# Patient Record
Sex: MALE | Employment: OTHER | URBAN - METROPOLITAN AREA
[De-identification: names, ages, dates, MRNs, and addresses within clinical notes are randomized per-mention and may not be internally consistent; named-entity substitution may affect disease eponyms.]

---

## 2024-05-24 ENCOUNTER — TRANSFERRED RECORDS (OUTPATIENT)
Dept: HEALTH INFORMATION MANAGEMENT | Facility: CLINIC | Age: 64
End: 2024-05-24

## 2024-07-19 ENCOUNTER — TRANSCRIBE ORDERS (OUTPATIENT)
Dept: OTHER | Age: 64
End: 2024-07-19

## 2024-07-19 ENCOUNTER — MEDICAL CORRESPONDENCE (OUTPATIENT)
Dept: HEALTH INFORMATION MANAGEMENT | Facility: CLINIC | Age: 64
End: 2024-07-19

## 2024-07-19 DIAGNOSIS — R91.8 ABNORMAL CT LUNG SCREENING: Primary | ICD-10-CM

## 2024-07-22 ENCOUNTER — PRE VISIT (OUTPATIENT)
Dept: ONCOLOGY | Facility: CLINIC | Age: 64
End: 2024-07-22

## 2024-07-22 ENCOUNTER — PATIENT OUTREACH (OUTPATIENT)
Dept: ONCOLOGY | Facility: CLINIC | Age: 64
End: 2024-07-22

## 2024-07-22 DIAGNOSIS — R91.8 PULMONARY NODULES: Primary | ICD-10-CM

## 2024-07-22 NOTE — TELEPHONE ENCOUNTER
RECORDS STATUS - ALL OTHER DIAGNOSIS      RECORDS RECEIVED FROM:    Appt Date: TBD NN WQ    Abnormal CT lung screening   Action    Action Taken 7/22/2024 2:42pm KESHERLEY     I called [facility] Ph: [phone] #0  (Stay on the line) - the phone went to . I sent a STAT request to fax: [phone].     I called [facility] [phone] - all the scans from 2024 were done at [facility]     I called [facility] [phone]     Ph: [phone]  Fax: [phone]  Mailing address:   [address] [place] [address]    [employer] Tracking Number: [id number]      NOTES STATUS DETAILS   OFFICE NOTE from referring provider  Jadiel Pate, DO    OFFICE NOTE from medical oncologist     DISCHARGE SUMMARY from hospital     DISCHARGE REPORT from the ER     OPERATIVE REPORT Complete PFT 7/22/2024   CLINICAL TRIAL TREATMENTS TO DATE     LABS     PATHOLOGY REPORTS     ANYTHING RELATED TO DIAGNOSIS     PATHOLOGY FEDEX TRACKING   TRACKING #:   GENONOMIC TESTING     TYPE:     IMAGING (NEED IMAGES & REPORT)     CT SCANS Requested- [facility]/ [facility]  CT Chest 5/24/2024   MRI     XRAYS Requested- [facility]     ULTRASOUND     PET     IMAGE DISC FEDEX TRACKING   TRACKING #:

## 2024-07-22 NOTE — PROGRESS NOTES
New IP (Interventional Pulmonology) referral rec'd.  Chart reviewed.       New Patient: Interventional Pulmonary (Lung nodule) Nurse Navigator Note    Referring provider: Dr Jadiel Pate affiliated with [facility]    Referred to (specialty): Interventional Pulmonary (Lung nodule)    Requested provider (if applicable): n/a    Date Referral Received: 7/22/2024    Evaluation for :  Mass like RUL opacity that is concerning for malignancy    Clinical History (per Nurse review of records provided):    **BOOK MARKED**    [facility]  PROCEDURE:  LOW DOSE CT OF THE CHEST WITHOUT IV CONTRAST     HISTORY: Lung cancer screening.     TECHNIQUE: Contiguous axial images were acquired through the chest utilizing   low-dose technique. Coronal and sagittal reconstructions.     CT DLP DOSE: 54 mGy-cm     COMPARISON: None available.     FINDINGS:   Lungs: Mild paraseptal type emphysema with an upper lobe predominance. There is   complete opacification of the proximal portion of a segmental branch of the   right upper lobe, best seen on axial mage 42 and coronal image 190. Adjacent to   this bronchus, are multiple calcifications, which are likely calcified lymph   nodes. Distal to the opacified bronchus, in the anteromedial aspect of the right   upper lobe on axial images 34 through 41, there is a masslike opacity measuring   4.9 x 4.2 cm. A few scattered endobronchial opacities are seen in other   subsegmental branches of the right upper lobe (also distal to the obstructed   bronchus). There is a pulmonary nodule seen in the right lower lobe   posterolaterally on axial image 75, measuring 0.7 x 0.6 cm, which contains a   tiny punctate central calcification.     Remainder of the chest: Normal heart size. No pericardial effusion. Severe   coronary artery calcifications. Normal caliber thoracic aorta. Calcified   mediastinal and hilar lymph nodes are like related to old healed  granulomatous   disease.     Visualized upper abdomen: Granulomatous calcifications spleen.     Bones: Degenerative changes.     IMPRESSION:   1. There is complete opacification of the right upper lobe bronchus, as   described above, with a masslike opacity seen in the distribution of this   bronchus. The endobronchial opacity could be related to a benign process, such   as an infectious or inflammatory process, or neoplastic process. The masslike   opacity could be related to postobstructive consolidation, although a neoplastic   process is not excluded. Recommend pulmonary consultation for possible   bronchoscopy, versus short-term follow-up chest CT or PET/CT.   2.  Indeterminate right lower lobe pulmonary nodule which could be related to a   partially calcified granuloma. Recommend follow-up per Fleischner Society   guidelines (follow-up in 3 to 6 months).   3.  Other findings as noted.     RECOMMENDATION: Pulmonary consultation for possible bronchoscopy, versus   short-term follow-up chest CT or PET/CT.     Lung-RADS category 4 - Suspicious: Findings for which additional diagnostic   testing and/or tissue sampling is recommended.     Please note that all CT scans at this facility use dose modulation, iterative   reconstruction, and/or weight-based dosing when appropriate to reduce radiation   dose to as low as reasonably achievable.      Electronically signed by Sarbjit Cronin MD   Report Date: 5/24/2024 2:02 PM  Records Location: [facility] &  ([facility] [employer] [facility])    RECORDS NEEDED:  Last FIVE years CHEST imaging pushed to PACS from [facility] [employer] [facility]----thank you!!    Additional testing needed prior to consult: pft's

## 2024-07-24 NOTE — TELEPHONE ENCOUNTER
Imaging DISC Received  July 24, 2024 11:15 AM AW   Action: Imaging disc from [facility] received and taken to [facility] for upload.

## 2024-07-30 ENCOUNTER — PATIENT OUTREACH (OUTPATIENT)
Dept: ONCOLOGY | Facility: CLINIC | Age: 64
End: 2024-07-30

## 2024-07-30 NOTE — PROGRESS NOTES
Writer received a message from NPS that they have not been able to reach Akira for his urgent Interventional Pulmonology referral. Per chart review, Marsha ELLISON Was holding 8/6 with Dr. Hutchinson at 3:00 pm for him. However, this spot is now scheduled with another pt.     Writer called Akira to discuss the referral. He reports he was not aware schedulers were trying to reach  him. He would like to still be scheduled next week if possible. Writer will discuss with Dr. Hutchinson and keep Akira updated..     Writer gave Akira our phone number if any additional questions or concerns in the meantime.     Gladys Olmstead, JENNIFFERN, RN, OCN  [facility] Oncology Nurse Navigator  [phone] / [phone]

## 2024-08-02 DIAGNOSIS — Z87.19 S/P INGUINAL HERNIA REPAIR: ICD-10-CM

## 2024-08-02 DIAGNOSIS — I25.10 CORONARY ARTERY CALCIFICATION: ICD-10-CM

## 2024-08-02 DIAGNOSIS — R91.8 PULMONARY NODULES: ICD-10-CM

## 2024-08-02 DIAGNOSIS — I25.10 CAD (CORONARY ARTERY DISEASE): Primary | ICD-10-CM

## 2024-08-02 DIAGNOSIS — Z98.890 S/P INGUINAL HERNIA REPAIR: ICD-10-CM

## 2024-08-02 DIAGNOSIS — J44.9 COPD (CHRONIC OBSTRUCTIVE PULMONARY DISEASE) (H): ICD-10-CM

## 2024-08-02 PROBLEM — Z86.0100 PERSONAL HISTORY OF COLONIC POLYPS: Status: ACTIVE | Noted: 2020-01-30

## 2024-08-02 RX ORDER — ROSUVASTATIN CALCIUM 20 MG/1
1 TABLET, COATED ORAL AT BEDTIME
COMMUNITY
Start: 2024-05-15

## 2024-08-02 RX ORDER — NICOTINE 21 MG/24HR
1 PATCH, TRANSDERMAL 24 HOURS TRANSDERMAL
COMMUNITY
Start: 2024-05-09

## 2024-08-02 NOTE — PROGRESS NOTES
Writer called Akira back and we are now able to get him scheduled with Dr. Hutchinson 8/6. Akira verbalized understanding and is in agreement with the plan. His call was transferred to NPS to schedule.     JENNIFFER CaballeroN, RN, OCN  [facility] Oncology Nurse Navigator  [phone] / [phone]

## 2024-08-02 NOTE — PROGRESS NOTES
Pre-visit planning and chart review completed.     NEW patient appointment:    8/6 with Dr. Hutchinson  7/26 CT chest w contrast  8/6 PFTs    - No anticoagulation.  - Current smoker.    CE updated. Medications, allergies, problem list, and immunizations reconciled.

## 2024-08-05 NOTE — TELEPHONE ENCOUNTER
RECORDS STATUS - ALL OTHER DIAGNOSIS      RECORDS RECEIVED FROM:    DATE RECEIVED:    NOTES STATUS DETAILS   OFFICE NOTE from referring provider     OFFICE NOTE from medical oncologist     DISCHARGE SUMMARY from hospital     DISCHARGE REPORT from the ER     OPERATIVE REPORT     MEDICATION LIST     CLINICAL TRIAL TREATMENTS TO DATE     LABS     PATHOLOGY REPORTS     ANYTHING RELATED TO DIAGNOSIS     PATHOLOGY FEDEX TRACKING   TRACKING #:   GENONOMIC TESTING     TYPE:     IMAGING (NEED IMAGES & REPORT)     CT SCANS PACS 4/24/24: [facility]    5/24/24: [facility]   MRI     XRAYS PACS 4/24/24: [facility]   ULTRASOUND     PET     IMAGE DISC FEDEX TRACKING   TRACKING #:

## 2024-08-06 ENCOUNTER — OFFICE VISIT (OUTPATIENT)
Dept: PULMONOLOGY | Facility: CLINIC | Age: 64
End: 2024-08-06
Payer: COMMERCIAL

## 2024-08-06 ENCOUNTER — PRE VISIT (OUTPATIENT)
Dept: PULMONOLOGY | Facility: CLINIC | Age: 64
End: 2024-08-06

## 2024-08-06 ENCOUNTER — TELEPHONE (OUTPATIENT)
Dept: PULMONOLOGY | Facility: CLINIC | Age: 64
End: 2024-08-06

## 2024-08-06 ENCOUNTER — VIRTUAL VISIT (OUTPATIENT)
Dept: PULMONOLOGY | Facility: CLINIC | Age: 64
End: 2024-08-06
Attending: INTERNAL MEDICINE
Payer: MEDICARE

## 2024-08-06 VITALS — HEIGHT: 65 IN | WEIGHT: 145 LBS | BODY MASS INDEX: 24.16 KG/M2

## 2024-08-06 DIAGNOSIS — R91.8 PULMONARY NODULES: ICD-10-CM

## 2024-08-06 DIAGNOSIS — R91.8 ABNORMAL CT LUNG SCREENING: ICD-10-CM

## 2024-08-06 DIAGNOSIS — R91.8 LUNG MASS: Primary | ICD-10-CM

## 2024-08-06 PROCEDURE — 94726 PLETHYSMOGRAPHY LUNG VOLUMES: CPT | Performed by: INTERNAL MEDICINE

## 2024-08-06 PROCEDURE — 99204 OFFICE O/P NEW MOD 45 MIN: CPT | Mod: 95 | Performed by: INTERNAL MEDICINE

## 2024-08-06 PROCEDURE — 94060 EVALUATION OF WHEEZING: CPT | Performed by: INTERNAL MEDICINE

## 2024-08-06 ASSESSMENT — PAIN SCALES - GENERAL: PAINLEVEL: NO PAIN (0)

## 2024-08-06 NOTE — LETTER
8/6/2024       RE: Akira Plasencia  [address] [facility] [address]     Dear Colleague,    Thank you for referring your patient, Akira Plasencia, to the [facility] at [facility]. Please see a copy of my visit note below.          [facility]  [address] [address]  Phone: [phone]  Fax: [phone]    Interventional Pulmonary Clinic Note    August 6, 2024        Chief complaint/Reason for Clinic Visit:  Akira Plasencia is a 64 year old male seen for   Chief Complaint   Patient presents with    Consult       Referred by or PCP: Jadiel Pate    Assessment and Plan:  # RUL pneumonia in April 2024 when he was treated as an outpatient.  There is still residual right upper lobe consolidation most likely scar tissue.  Based on CT scan images, right upper lobe segments (apical, anterior) are not patent.  I discussed trach airway examination with him and patient is in agreement with the plan.  We will also repeat CT scan of the chest in 3 to 5 months to establish stability of the right upper lobe masslike lesion.  Patient is in agreement with the plan.  Planned procedure: Bronchoscopy with airway examination with possible endobronchial biopsies if there is any lesion     # COPD based on PFTs from August 2024, not on any inhalers    # Active smoker trying to quit with nicotine patch.    History of Present Illness:  64 years old man accompanied by his sister-in-law during this virtual visit.  He was referred to my clinic for further evaluation of his abnormal CT findings.  He was presented in April 2024 with chest pain and cough when he was diagnosed of pneumonia and treated as an outpatient with antibiotic regimen for 7 to 10 days.    Lung problems: no official diagnosis of copd  Respiratory symptoms: no more cough or chest pain, some shortness of breath on exetion  Family lung  problems: no. Cancer in the family- breast and colon cancer   Smoking: smokes 1.5 ppd, for the last 50 years  Vaping: no  Exposure to chemicals, radiation or asbestos: no (lived [place] farm)    PFTs: (personally reviewed) mild obstruction from 8/6/2024  CTs, CXRs, Labs, PET, Echos: (personally reviewed)      No Known Allergies     No past medical history on file.     No past surgical history on file.     Social History     Socioeconomic History    Marital status: Single     Spouse name: Not on file    Number of children: Not on file    Years of education: Not on file    Highest education level: Not on file   Occupational History    Not on file   Tobacco Use    Smoking status: Every Day     Types: Cigarettes    Smokeless tobacco: Not on file   Substance and Sexual Activity    Alcohol use: Not on file    Drug use: Not on file    Sexual activity: Not on file   Other Topics Concern    Not on file   Social History Narrative    Not on file     Social Determinants of Health     Financial Resource Strain: High Risk (1/1/2022)    Received from [facility]    Financial Resource Strain     Difficulty of Paying Living Expenses: Not on file     Difficulty of Paying Living Expenses: Not on file   Food Insecurity: Not on file   Transportation Needs: Not on file   Physical Activity: Not on file   Stress: Not on file   Social Connections: Unknown (5/9/2024)    Received from [facility]    Social Connections     Frequency of Communication with Friends and Family: Not on file   Interpersonal Safety: Not on file   Housing Stability: Not on file        No family history on file.     Immunization History   Administered Date(s) Administered    TDAP (Adacel,Boostrix) 10/18/2011, 05/26/2019       Current Outpatient Medications   Medication Sig Dispense Refill    nicotine (NICODERM CQ) 21 MG/24HR 24 hr patch Place 1 patch onto the skin      rosuvastatin (CRESTOR) 20 MG tablet  "Take 1 tablet by mouth at bedtime       No current facility-administered medications for this visit.        Review of Systems:  I have done 10 points of review systems and all negative except for those mentioned in HPI    Physical examination:  Constitutional: Oriented, not in distress  Vitals: unavailable  Eyes: No icterus, nystagmus, pupils isocoric  Head and neck: normal posture and movements  Respiratory: Normal tidal breathing, no shortness of breath, no audible wheezing or stridor over the phone or video visit  Musculoskeletal: Normal muscle mass, no deformity on hands/fingers  Integumentary:  No rash on visible skin areas on video visit  Neurological: Alert, orientedx3, no motor deficits  Psychiatric:  Mood and affect are appropriate with insight into his/her medical condition    Data:  No results found for: \"WBC\"  No results found for: \"RBC\"  No results found for: \"HGB\"  No results found for: \"HCT\"  No results found for: \"MCV\"  No results found for: \"MCH\"  No results found for: \"MCHC\"  No results found for: \"RDW\"  No results found for: \"PLT\"    No results found for: \"NA\"   No results found for: \"POTASSIUM\"  No results found for: \"CHLORIDE\"  No results found for: \"RAF\"  No results found for: \"CO2\"  No results found for: \"BUN\"  No results found for: \"CR\"  No results found for: \"GLC\"      MARCY Hutchinson MD         "

## 2024-08-06 NOTE — PROGRESS NOTES
Virtual Visit Details    Type of service:  Video Visit   Video Start Time: 3:15 pm  Video End Time:3:35 pm    Originating Location (pt. Location): Home    Distant Location (provider location):  On-site  Platform used for Video Visit: [facility]  [address] [address]  Phone: [phone]  Fax: [phone]    Interventional Pulmonary Clinic Note    August 6, 2024        Chief complaint/Reason for Clinic Visit:  Akira Plasencia is a 64 year old male seen for   Chief Complaint   Patient presents with    Consult       Referred by or PCP: Jadiel Pate    Assessment and Plan:  # RUL pneumonia in April 2024 when he was treated as an outpatient.  There is still residual right upper lobe consolidation most likely scar tissue.  Based on CT scan images, right upper lobe segments (apical, anterior) are not patent.  I discussed trach airway examination with him and patient is in agreement with the plan.  We will also repeat CT scan of the chest in 3 to 5 months to establish stability of the right upper lobe masslike lesion.  Patient is in agreement with the plan.  Planned procedure: Bronchoscopy with airway examination with possible endobronchial biopsies if there is any lesion     # COPD based on PFTs from August 2024, not on any inhalers    # Active smoker trying to quit with nicotine patch.    History of Present Illness:  64 years old man accompanied by his sister-in-law during this virtual visit.  He was referred to my clinic for further evaluation of his abnormal CT findings.  He was presented in April 2024 with chest pain and cough when he was diagnosed of pneumonia and treated as an outpatient with antibiotic regimen for 7 to 10 days.    Lung problems: no official diagnosis of copd  Respiratory symptoms: no more cough or chest pain, some shortness of breath on exetion  Family lung problems: no. Cancer in the family- breast and colon cancer    Smoking: smokes 1.5 ppd, for the last 50 years  Vaping: no  Exposure to chemicals, radiation or asbestos: no (lived in a farm)    PFTs: (personally reviewed) mild obstruction from 8/6/2024  CTs, CXRs, Labs, PET, Echos: (personally reviewed)      No Known Allergies     No past medical history on file.     No past surgical history on file.     Social History     Socioeconomic History    Marital status: Single     Spouse name: Not on file    Number of children: Not on file    Years of education: Not on file    Highest education level: Not on file   Occupational History    Not on file   Tobacco Use    Smoking status: Every Day     Types: Cigarettes    Smokeless tobacco: Not on file   Substance and Sexual Activity    Alcohol use: Not on file    Drug use: Not on file    Sexual activity: Not on file   Other Topics Concern    Not on file   Social History Narrative    Not on file     Social Determinants of Health     Financial Resource Strain: High Risk (1/1/2022)    Received from [facility]    Financial Resource Strain     Difficulty of Paying Living Expenses: Not on file     Difficulty of Paying Living Expenses: Not on file   Food Insecurity: Not on file   Transportation Needs: Not on file   Physical Activity: Not on file   Stress: Not on file   Social Connections: Unknown (5/9/2024)    Received from [facility]    Social Connections     Frequency of Communication with Friends and Family: Not on file   Interpersonal Safety: Not on file   Housing Stability: Not on file        No family history on file.     Immunization History   Administered Date(s) Administered    TDAP (Adacel,Boostrix) 10/18/2011, 05/26/2019       Current Outpatient Medications   Medication Sig Dispense Refill    nicotine (NICODERM CQ) 21 MG/24HR 24 hr patch Place 1 patch onto the skin      rosuvastatin (CRESTOR) 20 MG tablet Take 1 tablet by mouth at bedtime       No current  "facility-administered medications for this visit.        Review of Systems:  I have done 10 points of review systems and all negative except for those mentioned in HPI    Physical examination:  Constitutional: Oriented, not in distress  Vitals: unavailable  Eyes: No icterus, nystagmus, pupils isocoric  Head and neck: normal posture and movements  Respiratory: Normal tidal breathing, no shortness of breath, no audible wheezing or stridor over the phone or video visit  Musculoskeletal: Normal muscle mass, no deformity on hands/fingers  Integumentary:  No rash on visible skin areas on video visit  Neurological: Alert, orientedx3, no motor deficits  Psychiatric:  Mood and affect are appropriate with insight into his/her medical condition    Data:  No results found for: \"WBC\"  No results found for: \"RBC\"  No results found for: \"HGB\"  No results found for: \"HCT\"  No results found for: \"MCV\"  No results found for: \"MCH\"  No results found for: \"MCHC\"  No results found for: \"RDW\"  No results found for: \"PLT\"    No results found for: \"NA\"   No results found for: \"POTASSIUM\"  No results found for: \"CHLORIDE\"  No results found for: \"RAF\"  No results found for: \"CO2\"  No results found for: \"BUN\"  No results found for: \"CR\"  No results found for: \"GLC\"      MARCY Hutchinson MD   "

## 2024-08-06 NOTE — NURSING NOTE
Patient confirms medications and allergies are accurate via patients echeck in completion, and or denies any changes since last reviewed/verified.       Current patient location: [address] [place] [address]    Is the patient currently in the state of [place]? YES    Visit mode:VIDEO    If the visit is dropped, the patient can be reconnected by: VIDEO VISIT: Text to cell phone:   Telephone Information:   Mobile [phone]       Will anyone else be joining the visit? Marsha Sister in Law  (If patient encounters technical issues they should call [phone])    How would you like to obtain your AVS? MyChart    Are changes needed to the allergy or medication list? No    Are refills needed on medications prescribed by this physician? NO    Rooming Documentation:  Questionnaire(s) not done per department protocol      Reason for visit: Consult    Radha Merino VVF

## 2024-08-07 NOTE — TELEPHONE ENCOUNTER
RNCC faxed CT order to [facility] per patient request as it is closer to his home.     Fax #: [phone]     Fax confirmed: SENT (on 8/6/2024 at 2009).

## 2024-08-08 ENCOUNTER — TELEPHONE (OUTPATIENT)
Dept: PULMONOLOGY | Facility: CLINIC | Age: 64
End: 2024-08-08
Payer: MEDICARE

## 2024-08-08 LAB
DLCOUNC-%PRED-PRE: 99 %
DLCOUNC-PRE: 22.86 ML/MIN/MMHG
DLCOUNC-PRED: 22.96 ML/MIN/MMHG
ERV-%PRED-PRE: 65 %
ERV-PRE: 0.82 L
ERV-PRED: 1.25 L
EXPTIME-PRE: 10.67 SEC
FEF2575-%PRED-POST: 53 %
FEF2575-%PRED-PRE: 48 %
FEF2575-POST: 1.21 L/SEC
FEF2575-PRE: 1.09 L/SEC
FEF2575-PRED: 2.25 L/SEC
FEFMAX-%PRED-PRE: 85 %
FEFMAX-PRE: 6.57 L/SEC
FEFMAX-PRED: 7.71 L/SEC
FEV1-%PRED-PRE: 78 %
FEV1-PRE: 2.09 L
FEV1FEV6-PRE: 69 %
FEV1FEV6-PRED: 78 %
FEV1FVC-PRE: 66 %
FEV1FVC-PRED: 78 %
FEV1SVC-PRE: 64 %
FEV1SVC-PRED: 70 %
FIFMAX-PRE: 4.1 L/SEC
FRCPLETH-%PRED-PRE: 77 %
FRCPLETH-PRE: 2.6 L
FRCPLETH-PRED: 3.36 L
FVC-%PRED-PRE: 93 %
FVC-PRE: 3.17 L
FVC-PRED: 3.41 L
IC-%PRED-PRE: 97 %
IC-PRE: 2.44 L
IC-PRED: 2.5 L
RVPLETH-%PRED-PRE: 75 %
RVPLETH-PRE: 1.79 L
RVPLETH-PRED: 2.37 L
TLCPLETH-%PRED-PRE: 82 %
TLCPLETH-PRE: 5.04 L
TLCPLETH-PRED: 6.11 L
VA-%PRED-PRE: 85 %
VA-PRE: 4.62 L
VC-%PRED-PRE: 85 %
VC-PRE: 3.26 L
VC-PRED: 3.8 L

## 2024-08-08 NOTE — TELEPHONE ENCOUNTER
Writer contacted patient to schedule GI procedure. Patient declined, stating that his appointments keep getting doubled up and nobody follows up with the next step. Patient stated he was trying to get things figured out in [place]. Staff message sent to provider.    Kwame Michelle on 8/8/2024 at 1:52 PM

## 2024-08-19 ENCOUNTER — PREP FOR PROCEDURE (OUTPATIENT)
Dept: PULMONOLOGY | Facility: CLINIC | Age: 64
End: 2024-08-19
Payer: MEDICARE

## 2024-08-19 NOTE — TELEPHONE ENCOUNTER
Writer received voicemail from patient's sister to schedule GI procedure. Scheduled 08/26 with Dr. Heart. Packet information sent via [facility] per patient request.    Kwame Michelle on 8/19/2024 at 11:38 AM

## 2024-08-26 ENCOUNTER — HOSPITAL ENCOUNTER (OUTPATIENT)
Facility: CLINIC | Age: 64
Discharge: HOME | End: 2024-08-26
Attending: INTERNAL MEDICINE | Admitting: INTERNAL MEDICINE
Payer: MEDICARE

## 2024-08-26 VITALS
RESPIRATION RATE: 18 BRPM | OXYGEN SATURATION: 94 % | SYSTOLIC BLOOD PRESSURE: 119 MMHG | HEART RATE: 84 BPM | DIASTOLIC BLOOD PRESSURE: 71 MMHG

## 2024-08-26 PROCEDURE — 250N000013 HC RX MED GY IP 250 OP 250 PS 637: Performed by: STUDENT IN AN ORGANIZED HEALTH CARE EDUCATION/TRAINING PROGRAM

## 2024-08-26 PROCEDURE — 250N000011 HC RX IP 250 OP 636: Performed by: INTERNAL MEDICINE

## 2024-08-26 PROCEDURE — 88305 TISSUE EXAM BY PATHOLOGIST: CPT | Mod: 26 | Performed by: STUDENT IN AN ORGANIZED HEALTH CARE EDUCATION/TRAINING PROGRAM

## 2024-08-26 PROCEDURE — 31625 BRONCHOSCOPY W/BIOPSY(S): CPT | Mod: GC | Performed by: INTERNAL MEDICINE

## 2024-08-26 PROCEDURE — 31622 DX BRONCHOSCOPE/WASH: CPT | Performed by: INTERNAL MEDICINE

## 2024-08-26 PROCEDURE — G0500 MOD SEDAT ENDO SERVICE >5YRS: HCPCS | Performed by: INTERNAL MEDICINE

## 2024-08-26 PROCEDURE — 250N000009 HC RX 250: Performed by: INTERNAL MEDICINE

## 2024-08-26 PROCEDURE — 31625 BRONCHOSCOPY W/BIOPSY(S): CPT | Performed by: INTERNAL MEDICINE

## 2024-08-26 PROCEDURE — 88305 TISSUE EXAM BY PATHOLOGIST: CPT | Mod: TC | Performed by: INTERNAL MEDICINE

## 2024-08-26 RX ORDER — LIDOCAINE HYDROCHLORIDE 10 MG/ML
INJECTION, SOLUTION INFILTRATION; PERINEURAL PRN
Status: DISCONTINUED | OUTPATIENT
Start: 2024-08-26 | End: 2024-08-26 | Stop reason: HOSPADM

## 2024-08-26 RX ORDER — FENTANYL CITRATE 50 UG/ML
INJECTION, SOLUTION INTRAMUSCULAR; INTRAVENOUS PRN
Status: DISCONTINUED | OUTPATIENT
Start: 2024-08-26 | End: 2024-08-26 | Stop reason: HOSPADM

## 2024-08-26 RX ORDER — LIDOCAINE HYDROCHLORIDE 40 MG/ML
INJECTION, SOLUTION RETROBULBAR PRN
Status: DISCONTINUED | OUTPATIENT
Start: 2024-08-26 | End: 2024-08-26 | Stop reason: HOSPADM

## 2024-08-26 RX ORDER — BENZONATATE 100 MG/1
100 CAPSULE ORAL ONCE
Status: COMPLETED | OUTPATIENT
Start: 2024-08-26 | End: 2024-08-26

## 2024-08-26 RX ADMIN — BENZONATATE 100 MG: 100 CAPSULE ORAL at 13:50

## 2024-08-26 ASSESSMENT — ACTIVITIES OF DAILY LIVING (ADL)
ADLS_ACUITY_SCORE: 35

## 2024-08-26 NOTE — DISCHARGE INSTRUCTIONS
Post Bronchoscopy Patient Instructions:    August 26, 2024  Akira ZAVALA Erinn    Your procedure completed (bronchoscopy with endobronchial biopsy form the right upper lung) without any immediate complications.  You may cough up scant amount of blood for the next 12-24 hours. If you have excessive cough with blood, chest pain, shortness of breath, please report to the closest emergency room.    You may experience low grade (less than 100.5 F) fever next 24 hours, if so, you can take Tylenol. If the fever persists more than 24 hours, please contact to our office or your primary care provider.    Our office will call you with the results of samples taken during the procedure. Please note that you may get a result notification through  My Chart  before us calling you, as the Laboratories are instructed to release the results as soon as they are available to the patients and providers at the same time. Please allow your us 24-48 hours call you to discuss the results.    You may resume your diet as it was prior to procedure.    You may resume your medications after the procedure unless you are instructed to do differently.     Please follow instructions from the nursing staff upon discharge in terms of activity. In general, you should avoid any attention or motor skill requiring activities (e.g., driving or operating any motorized vehicle) for 24 hours as you might be still under the effect of sedation medications. Please make sure an adult to accompany you next 24 hours.     Should you have any question, please do not hesitate to call our office [facility] [phone].     Please take a few moments to evaluate the care you received by me on this link: [email or web address]  Interventional Pulmonary Fellow

## 2024-08-26 NOTE — PROCEDURES
INTERVENTIONAL PULMONOLOGY       Procedure(s):    A flexible bronchoscopy  Endobronchial biopsies (1 sites)  Therapeutic suctioning (2 sites)    Indication:  RUL non resolving PNA     Attending of Record:  Angela Heart MD     Interventional Pulmonary Fellow   Bradley Whitehead    Trainees Present:   None     Medications:    9 ml 4% lidocaine  18 ml 1% lidocaine  1 spray(s) hurricaine spray  2.5 mg versed  125 mcg fentanyl    Sedation Time:   Total sedation time was Choose Duration: 19 minutes of continuous bedside 1:1 monitoring.    Time Out:  Performed    The patient's medical record has been reviewed.  The indication for the procedure was reviewed.  The necessary history and physical examination was performed and reviewed.  The risks, benefits and alternatives of the procedure were discussed with the the patient in detail and he had the opportunity to ask questions.  I discussed in particular the potential complications including risks of minor or life-threatening bleeding and/or infection, respiratory failure, vocal cord trauma / paralysis, pneumothorax, and discomfort. Sedation risks were also discussed including abnormal heart rhythms, low blood pressure, and respiratory failure. All questions were answered to the best of my ability.  Verbal and written informed consent was obtained.  The proposed procedure and the patient's identification were verified prior to the procedure by the physician and the nurse.    The patient was assessed for the adequacy for the procedure and to receive medications.   Mental Status:  Alert and oriented x 3  Airway examination:  Class I (Complete visualization of soft palate)  Pulmonary:  Non labored respirations  CV:  Regular rate  ASA Grade:  (II)  Mild systemic disease    After clinical evaluation and reviewing the indication, risks, alternatives and benefits of the procedure the patient was deemed to be in satisfactory condition to undergo the procedure.      Immediately  before administration of medications the patient was re-assessed for adequacy to receive sedatives including the heart rate, respiratory rate, mental status, oxygen saturation, blood pressure and adequacy of pulmonary ventilation. These same parameters were continuously monitored throughout the procedure.    A Tuberculosis risk assessment was performed:  The patient has no known RISK of Tuberculosis    The procedure was performed in a negative airflow room: Yes    Maneuvers / Procedure:      Airway Examination: A complete airway examination was performed from the distal trachea to the subsegmental level in each lobe of both lungs.  Pertinent findings include RUL anterior segment endobronchial occlusion.     Endobronchial biopsies: One Site - The bronchoscope was inserted. Topical epinephrine was administered.  The Forceps were introduced and endobronchial biopsies were obtained from RUL. A total of 4 biopsies were obtained.    Any disposable equipment was visually inspected and deemed to be intact immediately post procedure.      Relevant Pictures  RUL anterior segment endobrobchial lesion       Assessment and Recommendations:   Successful completion of FB with endobronchial biopsies from the RUL anterior segment.   Ok to discharge once medically stable.   Follow up pathology results.           Bradley Whitehead  Interventional pulmonary fellow  Pager: [phone]

## 2024-08-27 ENCOUNTER — TELEPHONE (OUTPATIENT)
Dept: PULMONOLOGY | Facility: CLINIC | Age: 64
End: 2024-08-27
Payer: MEDICARE

## 2024-08-27 LAB
PATH REPORT.COMMENTS IMP SPEC: NORMAL
PATH REPORT.COMMENTS IMP SPEC: NORMAL
PATH REPORT.FINAL DX SPEC: NORMAL
PATH REPORT.GROSS SPEC: NORMAL
PATH REPORT.MICROSCOPIC SPEC OTHER STN: NORMAL
PATH REPORT.RELEVANT HX SPEC: NORMAL
PHOTO IMAGE: NORMAL

## 2024-08-27 NOTE — TELEPHONE ENCOUNTER
----- Message from Alhaji Hutchinson sent at 8/27/2024  1:41 PM CDT -----  Dionne,    He is my clinic patient who had a bronch done yesterday with endobronchial biopsies that showed no malignancy. He is already schedule to see me with CT chest in early Nov. We will keep that appointment.    Please let him know th result of the biopsy and the plan.    Thanks    E  ----- Message -----  From: Lab, Background User  Sent: 8/27/2024   9:37 AM CDT  To: Alhaji Hutchinson MD

## 2024-10-28 RX ORDER — POLYETHYLENE GLYCOL 3350 17 G
2 POWDER IN PACKET (EA) ORAL
COMMUNITY
Start: 2024-08-13

## 2024-10-29 NOTE — PROGRESS NOTES
Pre-visit planning and chart review completed.     RETURN patient appointment:    11/5 with Dr. Chris Hutchinson  11/5 CT chest wo contrast    - 3 month follow-up post biopsy on 8/26.    CE updated. Medications, allergies, problem list, and immunizations reconciled.

## 2024-11-05 ENCOUNTER — ONCOLOGY VISIT (OUTPATIENT)
Dept: PULMONOLOGY | Facility: CLINIC | Age: 64
End: 2024-11-05
Attending: INTERNAL MEDICINE
Payer: MEDICARE

## 2024-11-05 ENCOUNTER — ANCILLARY PROCEDURE (OUTPATIENT)
Dept: CT IMAGING | Facility: CLINIC | Age: 64
End: 2024-11-05
Attending: INTERNAL MEDICINE
Payer: COMMERCIAL

## 2024-11-05 VITALS
HEART RATE: 94 BPM | SYSTOLIC BLOOD PRESSURE: 133 MMHG | DIASTOLIC BLOOD PRESSURE: 90 MMHG | TEMPERATURE: 97.8 F | BODY MASS INDEX: 24.86 KG/M2 | RESPIRATION RATE: 16 BRPM | WEIGHT: 149.4 LBS | OXYGEN SATURATION: 98 %

## 2024-11-05 DIAGNOSIS — R91.8 LUNG MASS: ICD-10-CM

## 2024-11-05 DIAGNOSIS — R91.8 MASS OF UPPER LOBE OF LEFT LUNG: Primary | ICD-10-CM

## 2024-11-05 PROCEDURE — G0463 HOSPITAL OUTPT CLINIC VISIT: HCPCS | Performed by: INTERNAL MEDICINE

## 2024-11-05 PROCEDURE — 99214 OFFICE O/P EST MOD 30 MIN: CPT | Performed by: INTERNAL MEDICINE

## 2024-11-05 PROCEDURE — 71250 CT THORAX DX C-: CPT | Performed by: RADIOLOGY

## 2024-11-05 ASSESSMENT — PAIN SCALES - GENERAL: PAINLEVEL_OUTOF10: NO PAIN (0)

## 2024-11-05 NOTE — NURSING NOTE
"Oncology Rooming Note    November 5, 2024 2:48 PM   Akira Plasencia is a 64 year old male who presents for:    Chief Complaint   Patient presents with    Oncology Clinic Visit     Pulmonary nodules     Initial Vitals: BP (!) 133/90 (BP Location: Left arm, Patient Position: Sitting, Cuff Size: Adult Regular)   Pulse 94   Temp 97.8  F (36.6  C) (Oral)   Resp 16   Wt 67.8 kg (149 lb 6.4 oz)   SpO2 98%   BMI 24.86 kg/m   Estimated body mass index is 24.86 kg/m  as calculated from the following:    Height as of 8/6/24: 1.651 m (5' 5\").    Weight as of this encounter: 67.8 kg (149 lb 6.4 oz). Body surface area is 1.76 meters squared.  No Pain (0) Comment: Data Unavailable   No LMP for male patient.  Allergies reviewed: Yes  Medications reviewed: Yes    Medications: Medication refills not needed today.  Pharmacy name entered into [facility]: [facility] [address] [place] [address] - [facility]    Frailty Screening:   Is the patient here for a new oncology consult visit in cancer care? 2. No      Clinical concerns: Patient states no new concerns to discuss with provider.       Triny De Jesus EMT            "

## 2024-11-05 NOTE — PROGRESS NOTES
[facility]  [address] [facility] [address]  Phone: [phone]  Fax: [phone]    Interventional Pulmonary Clinic Note    November 5, 2024        Chief complaint/Reason for Clinic Visit:  Akira Plasencia is a 64 year old male seen for   Chief Complaint   Patient presents with    Oncology Clinic Visit     Pulmonary nodules       Referred by or PCP: Jadiel Pate       Assessment and Plan:  # RUL pneumonia in April 2024 when he was treated as an outpatient.  There is still residual right upper lobe consolidation most likely scar tissue.  Based on CT scan images, right upper lobe segments (apical, anterior) are not patent.  I discussed trach airway examination with him and patient is in agreement with the plan.  We will also repeat CT scan of the chest in 3 to 5 months to establish stability of the right upper lobe masslike lesion.  Patient is in agreement with the plan.  Bronchoscopy with airway examination with endobronchial biopsies performed on 8/26 at the RUL ant segment that was occluded. Biopsies showed benign bronchial mucosa, ch inflammation but no malignancy.  Ordered PET scan and reconsider biopsy if there is any area with PET avidity because CT from today shows enlargement of the RUL mass like lesion.      # COPD based on PFTs from August 2024, not on any inhalers     # Active smoker trying to quit with nicotine patch.     History of Present Illness:  64 years old man accompanied by his sister-in-law during this virtual visit.  He was referred to my clinic for further evaluation of his abnormal CT findings.  He was presented in April 2024 with chest pain and cough when he was diagnosed of pneumonia and treated as an outpatient with antibiotic regimen for 7 to 10 days.     Lung problems: no official diagnosis of copd  Respiratory symptoms: no more cough or chest pain, some shortness of breath on exetion  Family lung problems: no. Cancer in the family-  breast and colon cancer   Smoking: smokes 1.5 ppd, for the last 50 years  Vaping: no  Exposure to chemicals, radiation or asbestos: no (lived [place] farm)     PFTs: (personally reviewed) mild obstruction from 8/6/2024  CTs, CXRs, Labs, PET, Echos: (personally reviewed)                 No Known Allergies     No past medical history on file.     Past Surgical History:   Procedure Laterality Date    BRONCHOSCOPY FLEXIBLE N/A 8/26/2024    Procedure: BRONCHOSCOPY, FLEXIBLE, airway exam, endobronchial biopsies;  Surgeon: Angela Heart MD;  Location: [facility]        Social History     Socioeconomic History    Marital status: Single     Spouse name: Not on file    Number of children: Not on file    Years of education: Not on file    Highest education level: Not on file   Occupational History    Not on file   Tobacco Use    Smoking status: Every Day     Types: Cigarettes    Smokeless tobacco: Not on file   Substance and Sexual Activity    Alcohol use: Not on file    Drug use: Not on file    Sexual activity: Not on file   Other Topics Concern    Not on file   Social History Narrative    Not on file     Social Drivers of Health     Financial Resource Strain: High Risk (1/1/2022)    Received from [facility]    Financial Resource Strain     Difficulty of Paying Living Expenses: Not on file     Difficulty of Paying Living Expenses: Not on file   Food Insecurity: Not on file   Transportation Needs: Not on file   Physical Activity: Not on file   Stress: Not on file   Social Connections: Unknown (5/9/2024)    Received from [facility]    Social Connections     Frequency of Communication with Friends and Family: Not on file   Interpersonal Safety: High Risk (8/26/2024)    Interpersonal Safety     Do you feel physically and emotionally safe where you currently live?: No     Within the past 12 months, have you been hit, slapped, kicked or otherwise physically hurt by  someone?: No     Within the past 12 months, have you been humiliated or emotionally abused in other ways by your partner or ex-partner?: No   Housing Stability: Not on file        No family history on file.     Immunization History   Administered Date(s) Administered    TDAP (Adacel,Boostrix) 10/18/2011, 05/26/2019       Current Outpatient Medications   Medication Sig Dispense Refill    nicotine (COMMIT) 2 MG lozenge Place 2 mg inside cheek every hour as needed for nicotine withdrawal symptoms.      nicotine (NICODERM CQ) 21 MG/24HR 24 hr patch Place 1 patch onto the skin (Patient not taking: Reported on 11/5/2024)      rosuvastatin (CRESTOR) 20 MG tablet Take 1 tablet by mouth at bedtime (Patient not taking: Reported on 11/5/2024)       No current facility-administered medications for this visit.        Review of Systems:  I have done 10 points of review systems and all negative except for those mentioned in HPI    Physical examination:  Constitutional: Oriented, not in distress  @vitals  Eyes: No icterus, nystagmus, pupils isocoric  Head and neck: normal posture and movements  Respiratory: Normal tidal breathing, no shortness of breath, no audible wheezing or stridor   Musculoskeletal: Normal muscle mass, no deformity on hands/fingers  Integumentary:  No rash on visible skin areas   Neurological: Alert, orientedx3, no motor deficits  Psychiatric:  Mood and affect are appropriate with insight into his/her medical condition          MARCY Hutchinson MD

## 2024-11-20 ENCOUNTER — HOSPITAL ENCOUNTER (OUTPATIENT)
Dept: PET IMAGING | Facility: CLINIC | Age: 64
Discharge: HOME | End: 2024-11-20
Attending: INTERNAL MEDICINE
Payer: MEDICARE

## 2024-11-20 DIAGNOSIS — R91.8 MASS OF UPPER LOBE OF LEFT LUNG: ICD-10-CM

## 2024-11-20 LAB — GLUCOSE BLDC GLUCOMTR-MCNC: 88 MG/DL (ref 70–99)

## 2024-11-20 PROCEDURE — 78816 PET IMAGE W/CT FULL BODY: CPT | Mod: MG,PI

## 2024-11-20 PROCEDURE — 343N000001 HC RX 343 MED OP 636: Performed by: INTERNAL MEDICINE

## 2024-11-20 PROCEDURE — 82962 GLUCOSE BLOOD TEST: CPT

## 2024-11-20 PROCEDURE — G1010 CDSM STANSON: HCPCS | Mod: PI

## 2024-11-20 PROCEDURE — A9552 F18 FDG: HCPCS | Performed by: INTERNAL MEDICINE

## 2024-11-20 RX ORDER — FLUDEOXYGLUCOSE F 18 200 MCI/ML
10-18 INJECTION, SOLUTION INTRAVENOUS ONCE
Status: COMPLETED | OUTPATIENT
Start: 2024-11-20 | End: 2024-11-20

## 2024-11-20 RX ADMIN — FLUDEOXYGLUCOSE F 18 10.09 MILLICURIE: 200 INJECTION, SOLUTION INTRAVENOUS at 10:11

## 2024-11-25 ENCOUNTER — MYC MEDICAL ADVICE (OUTPATIENT)
Dept: PULMONOLOGY | Facility: CLINIC | Age: 64
End: 2024-11-25
Payer: COMMERCIAL

## 2024-11-25 DIAGNOSIS — R91.8 MASS OF UPPER LOBE OF LEFT LUNG: Primary | ICD-10-CM

## 2024-11-25 DIAGNOSIS — R91.8 LUNG MASS: ICD-10-CM

## 2024-11-25 DIAGNOSIS — R91.8 MASS OF UPPER LOBE OF LEFT LUNG: ICD-10-CM

## 2024-11-25 NOTE — CONSULTS
"    Outpatient IR Referral  11/25/24    Referring Provider: Dr. Alhaji Hutchinson  IR Referral Request: Right lung biopsy (RUL on PET scan 605/127)  Recommendations/Plan:   Discussed patient's case with Dr. Patel. Concerns for biopsy involve us not being able to see the blood vessels going near the mass. Dr. Patel has suggested that we request referring provider to obtain a CT of the chest with contrast in order to see patient's blood vessels with some more clarity.     Likely, we would plan for the inferior avid portion of the RUL via a lateral approach, however, at this time, we would need updated imaging of that area to determine where his blood vessels traverse.     Once we have the CT chest with contrast, we will review.    Orders for procedure have yet to be placed. If biopsy approved, patient will need a visit with IR MENDEZ in clinic (can be virtual). We will also need to place surgical pathology orders.     Brief History:    Akira Plasencia is a 64 year old male with history of COPD, CAD, and pulmonary nodules.    Briefly, patient was treated for RUL pneumonia in 4/2024, a follow up CT was obtained in 5/2024 which recommended evaluation.     Patient had bronchoscopy with airway examination with endobronchial biopsies performed on 8/26/24. Biopsies showed benign bronchial mucosa inflammation, but no malignancy. A PET scan was obtained on 11/20/24 which showed \"Right upper lobe consolidative masslike opacity demonstrates patchy heterogeneous areas increased FDG metabolism. The most FDG avid area is in the posterior inferior aspect of the mass, close to the fissure.\" Of note, the mass has increased since 5/24/24.     Pertinent Medications:    No current outpatient medications on file.     No current facility-administered medications for this visit.       Pertinent Imaging Reviewed:      Most Recent Labs:  No results found for: \"WBC\"  No results found for: \"RBC\"  No results found for: \"HGB\"  No results found for: " "\"HCT\"  No results found for: \"PLT\" Last Comprehensive Metabolic Panel:  Lab Results   Component Value Date    GLC 88 11/20/2024      No results found for: \"INR\"            Order Questions    Question Answer   What is the reason for the IR order request? Biopsy   What type of biopsy is needed? Lung   Laterality? Right   Additional laterality information? RUL on PET scan (605/127)   Patients clinical information/history? need tissue diagnosis   Is this biopsy procedure for research? No   Specimen orders should be placed per site protocol Acknowledge   Is there a specific location the exam needs to be scheduled at? No specific location required   Call Back # dr. douglas casiano   Is the patient on aspirin, Plavix or blood thinners? No       Breann Lucas PA-C  Interventional Radiology   [phone] (IR RN triage)    "

## 2024-11-29 ENCOUNTER — ANCILLARY PROCEDURE (OUTPATIENT)
Dept: CT IMAGING | Facility: CLINIC | Age: 64
End: 2024-11-29
Attending: INTERNAL MEDICINE
Payer: COMMERCIAL

## 2024-11-29 DIAGNOSIS — R91.8 LUNG MASS: ICD-10-CM

## 2024-11-29 PROCEDURE — 71260 CT THORAX DX C+: CPT | Mod: GC | Performed by: RADIOLOGY

## 2024-11-29 RX ORDER — IOPAMIDOL 755 MG/ML
74 INJECTION, SOLUTION INTRAVASCULAR ONCE
Status: COMPLETED | OUTPATIENT
Start: 2024-11-29 | End: 2024-11-29

## 2024-11-29 RX ADMIN — IOPAMIDOL 74 ML: 755 INJECTION, SOLUTION INTRAVASCULAR at 09:16

## 2024-11-29 NOTE — DISCHARGE INSTRUCTIONS

## 2024-12-02 ENCOUNTER — TELEPHONE (OUTPATIENT)
Dept: RADIOLOGY | Facility: CLINIC | Age: 64
End: 2024-12-02
Payer: COMMERCIAL

## 2024-12-02 NOTE — TELEPHONE ENCOUNTER
Left Voicemail (1st Attempt) and Sent Mychart (1st Attempt) for the patient to call back and schedule the following:To discuss biopsy procedure, risks and benefits as needed.    Location: [facility]  Provider: the Interventional Radiology Physician Assistant Clinic  Appointment type: New Vascular Patient  Appointment mode: Telephone  Return date: Next Available     Specialty phone number: [phone] or [phone]    Referred to [facility]: No    Is Imaging Needed: No    Additional Notes:NARINDER Castaneda on 12/2/2024 at 3:56 PM

## 2024-12-11 ENCOUNTER — VIRTUAL VISIT (OUTPATIENT)
Dept: VASCULAR SURGERY | Facility: CLINIC | Age: 64
End: 2024-12-11
Payer: COMMERCIAL

## 2024-12-11 VITALS — WEIGHT: 145 LBS | BODY MASS INDEX: 24.16 KG/M2 | HEIGHT: 65 IN

## 2024-12-11 DIAGNOSIS — R91.8 PULMONARY NODULES: Primary | ICD-10-CM

## 2024-12-11 ASSESSMENT — PAIN SCALES - GENERAL: PAINLEVEL_OUTOF10: NO PAIN (0)

## 2024-12-11 NOTE — PROGRESS NOTES
INTERVENTIONAL RADIOLOGY CLINIC NOTE    Patient Name:  Akira Plasencia  Date of Visit: 12/11/2024  Referring Provider: Dr. Alhaji Hutchinson    REASON FOR VISIT: To discuss upcoming IR lung biopsy      ASSESSMENT:  Summary: 64 year old male with a PMH of tobacco use, COPD, CAD and a RUL lung mass who presents to IR to discuss his upcoming CT-guided RUL lung biopsy. Patient reports being able to lay flat, on side or prone. He is not taking any blood thinners or AC. He denies any chest pain, SOB at rest, cough, pulmonary HTN, bleeding history, history of issues with fentanyl or versed, sleep apnea, asthma, issues with receiving blood transfusions.        PLAN:  -Patient is scheduled with IR on 12/13/24 for a CT-guided right upper lobe lung biopsy. Provider to review CT with contrast for safest approach to avoid large vessels day of procedure as this is a very high risk for bleeding and pneumothorax.   -The patient's medical data, including pertinent imaging, was reviewed.    -Education was given on the planned procedure and why it was requested, including a detailed description of interventional radiology's role.    -The use of moderate sedation was reviewed.    -Biopsy procedure along with risks, benefits of the procedure and sedation as well as how pathology results will be communicated have been discussed with the patient. Risk of lung biopsy was discussed which includes but is not limited to post procedure pain, bleeding that may require blood transfusion or procedures to stop the bleeding, infection that may require treatment with medications, injury to adjacent nerves, vessels or organ systems, non diagnostic sample that may require repeat biopsy, injury to the lung that may require chest tube placement and overnight admission with potential for specialist consultation/intervention and the possibility of death.   -Patient is encouraged to pack overnight bag in the event that admission to hospital is needed.   "  -No further imaging will be necessary prior to the procedure.  -No further laboratory testing will be necessary prior to the procedure. Updated labs can be checked the day of the procedure.    -All questions and concerns are addressed.  Patient verbalized understanding of procedure and instructions.   Formal written consent to be obtained the day of procedure.         Elisabet Avila PA-C  Interventional Radiology  IR Nurse Triage: [phone]  The IR outpatient  and can be reached at [phone].     ========================================================================    HISTORY OF PRESENT ILLNESS:  Akira Plasencia is a 64 year old male with history of tobacco abuse, COPD, CAD, and pulmonary nodules. Briefly, patient was treated for RUL pneumonia in 4/2024, a follow up CT was obtained in 5/2024 which recommended evaluation. Patient had bronchoscopy with airway examination with endobronchial biopsies performed on 8/26/24. Biopsies showed benign bronchial mucosa inflammation, but no malignancy. A PET scan was obtained on 11/20/24 which showed \"Right upper lobe consolidative masslike opacity demonstrates patchy heterogeneous areas increased FDG metabolism. The most FDG avid area is in the posterior inferior aspect of the mass, close to the fissure.\" Of note, the mass has increased since 5/24/24.       PAST MEDICAL HISTORY:  No past medical history on file.     PAST SURGICAL HISTORY:  Past Surgical History:   Procedure Laterality Date    BRONCHOSCOPY FLEXIBLE N/A 8/26/2024    Procedure: BRONCHOSCOPY, FLEXIBLE, airway exam, endobronchial biopsies;  Surgeon: Angela Heart MD;  Location: [facility]       PAST SOCIAL HISTORY  Social History     Socioeconomic History    Marital status: Single     Spouse name: Not on file    Number of children: Not on file    Years of education: Not on file    Highest education level: Not on file   Occupational History    Not on file   Tobacco Use    Smoking status: " "Every Day     Types: Cigarettes    Smokeless tobacco: Not on file   Substance and Sexual Activity    Alcohol use: Not on file    Drug use: Not on file    Sexual activity: Not on file   Other Topics Concern    Not on file   Social History Narrative    Not on file     Social Drivers of Health     Financial Resource Strain: High Risk (1/1/2022)    Received from [facility]    Financial Resource Strain     Difficulty of Paying Living Expenses: Not on file     Difficulty of Paying Living Expenses: Not on file   Food Insecurity: Not on file   Transportation Needs: Not on file   Physical Activity: Not on file   Stress: Not on file   Social Connections: Unknown (5/9/2024)    [facility]    Social Connections     Frequency of Communication with Friends and Family: Not on file   Interpersonal Safety: High Risk (8/26/2024)    Interpersonal Safety     Do you feel physically and emotionally safe where you currently live?: No     Within the past 12 months, have you been hit, slapped, kicked or otherwise physically hurt by someone?: No     Within the past 12 months, have you been humiliated or emotionally abused in other ways by your partner or ex-partner?: No   Housing Stability: Not on file       MEDICATIONS:  No current outpatient medications on file.     No current facility-administered medications for this visit.       ALLERGIES:   No Known Allergies    PHYSICAL EXAMINATION:  There were no vitals taken for this visit.   General: AAOx3. Pleasant in NAD.   LUNGS: Normal respirations.    LABORATORY RESULTS:  No results found for: \"WBC\"  No results found for: \"HGB\"  No results found for: \"PLT\"  No results found for: \"INR\"     DIAGNOSTIC IMAGING:  CT w contrast 11/29/24                   Lungs: Redemonstration of a large area of masslike consolidation in  the anterior right upper lobe with calcified right hilar lymph nodes.  No significant interval " change in comparison to the previous exam with  stable size and morphology in comparison to prior. This area continues  to appear spiculated on its peripheral margins, and there is  obliteration of the anteromedial right upper lobe segmental bronchus.  This mass/ill-defined consolidation appears to encase the right  superior pulmonary vein as well as the right upper lobe anterior  segmental pulmonary arterial branch. Emphysematous changes in the  lungs.    Stable calcified granulomas similar to prior, for example in the right  lower lobe in the periphery (series 4, image 209). Stable scattered  sub-5 mm pulmonary nodules. Mild to moderate centrilobular and  paraseptal emphysematous changes with an apical predominance.     IMPRESSION:    1. No significant interval change in the anterior right suprahilar  mass/consolidation continuing to raise suspicion for possible  malignancy. With the use of intravenous contrast, this masslike  consolidation appears to encase the right superior pulmonary vein and  right upper lobe anterior segmental branches of the right main  pulmonary artery.  2. Stable prominent nonenlarged mediastinal lymph nodes with calcified  right hilar nodes.  3. Moderate centrilobular and paraseptal emphysematous changes similar  prior.  4. Modest atherosclerotic calcifications of the coronary arteries.                 =====    A total of 15 minutes was spent with the patient.  Greater than 50% of the time was spent in counseling, education, and coordination of care.      CC:  1) Referring Provider  Alhaji Hutchinson MD  [address] [address]    2) Primary Care Provider  Jadiel Pate DO  [address] [place] [address]

## 2024-12-11 NOTE — NURSING NOTE
Current patient location: [address] [place] [id number]    Is the patient currently in the state of [place]? YES    Visit mode:TELEPHONE    If the visit is dropped, the patient can be reconnected by:TELEPHONE VISIT: Phone number:   Telephone Information:   Mobile [phone]       Will anyone else be joining the visit? Karime-Pt's Sister  (If patient encounters technical issues they should call [phone])    Are changes needed to the allergy or medication list? No    Are refills needed on medications prescribed by this physician? NO    Rooming Documentation:  Questionnaire(s) completed    Reason for visit: Consult    Catherine MARINO

## 2024-12-13 ENCOUNTER — HOSPITAL ENCOUNTER (OUTPATIENT)
Facility: CLINIC | Age: 64
Discharge: HOME | End: 2024-12-13
Attending: STUDENT IN AN ORGANIZED HEALTH CARE EDUCATION/TRAINING PROGRAM | Admitting: STUDENT IN AN ORGANIZED HEALTH CARE EDUCATION/TRAINING PROGRAM
Payer: MEDICARE

## 2024-12-13 ENCOUNTER — APPOINTMENT (OUTPATIENT)
Dept: INTERVENTIONAL RADIOLOGY/VASCULAR | Facility: CLINIC | Age: 64
End: 2024-12-13
Attending: INTERNAL MEDICINE
Payer: MEDICARE

## 2024-12-13 ENCOUNTER — APPOINTMENT (OUTPATIENT)
Dept: GENERAL RADIOLOGY | Facility: CLINIC | Age: 64
End: 2024-12-13
Payer: MEDICARE

## 2024-12-13 ENCOUNTER — APPOINTMENT (OUTPATIENT)
Dept: MEDSURG UNIT | Facility: CLINIC | Age: 64
End: 2024-12-13
Attending: STUDENT IN AN ORGANIZED HEALTH CARE EDUCATION/TRAINING PROGRAM
Payer: MEDICARE

## 2024-12-13 VITALS
TEMPERATURE: 98 F | RESPIRATION RATE: 16 BRPM | OXYGEN SATURATION: 97 % | BODY MASS INDEX: 25.73 KG/M2 | WEIGHT: 154.6 LBS | DIASTOLIC BLOOD PRESSURE: 82 MMHG | HEART RATE: 62 BPM | SYSTOLIC BLOOD PRESSURE: 130 MMHG

## 2024-12-13 DIAGNOSIS — R91.8 MASS OF RIGHT LUNG: ICD-10-CM

## 2024-12-13 LAB
ERYTHROCYTE [DISTWIDTH] IN BLOOD BY AUTOMATED COUNT: 15.2 % (ref 10–15)
HCT VFR BLD AUTO: 44.4 % (ref 40–53)
HGB BLD-MCNC: 14.3 G/DL (ref 13.3–17.7)
INR PPP: 0.9 (ref 0.85–1.15)
MCH RBC QN AUTO: 27.7 PG (ref 26.5–33)
MCHC RBC AUTO-ENTMCNC: 32.2 G/DL (ref 31.5–36.5)
MCV RBC AUTO: 86 FL (ref 78–100)
PATH REPORT.COMMENTS IMP SPEC: NORMAL
PATH REPORT.FINAL DX SPEC: NORMAL
PATH REPORT.MICROSCOPIC SPEC OTHER STN: NORMAL
PATH REPORT.RELEVANT HX SPEC: NORMAL
PLATELET # BLD AUTO: 330 10E3/UL (ref 150–450)
RBC # BLD AUTO: 5.17 10E6/UL (ref 4.4–5.9)
WBC # BLD AUTO: 9.5 10E3/UL (ref 4–11)

## 2024-12-13 PROCEDURE — 88341 IMHCHEM/IMCYTCHM EA ADD ANTB: CPT | Mod: TC | Performed by: INTERNAL MEDICINE

## 2024-12-13 PROCEDURE — 99152 MOD SED SAME PHYS/QHP 5/>YRS: CPT | Performed by: STUDENT IN AN ORGANIZED HEALTH CARE EDUCATION/TRAINING PROGRAM

## 2024-12-13 PROCEDURE — 272N000506 HC NEEDLE CR6

## 2024-12-13 PROCEDURE — 85018 HEMOGLOBIN: CPT

## 2024-12-13 PROCEDURE — 85041 AUTOMATED RBC COUNT: CPT

## 2024-12-13 PROCEDURE — 32408 CORE NDL BX LNG/MED PERQ: CPT | Mod: RT | Performed by: STUDENT IN AN ORGANIZED HEALTH CARE EDUCATION/TRAINING PROGRAM

## 2024-12-13 PROCEDURE — 88342 IMHCHEM/IMCYTCHM 1ST ANTB: CPT | Mod: 26 | Performed by: PATHOLOGY

## 2024-12-13 PROCEDURE — 36415 COLL VENOUS BLD VENIPUNCTURE: CPT

## 2024-12-13 PROCEDURE — 250N000009 HC RX 250

## 2024-12-13 PROCEDURE — 999N000134 HC STATISTIC PP CARE STAGE 3

## 2024-12-13 PROCEDURE — 999N000142 HC STATISTIC PROCEDURE PREP ONLY

## 2024-12-13 PROCEDURE — 88188 FLOWCYTOMETRY/READ 9-15: CPT | Performed by: STUDENT IN AN ORGANIZED HEALTH CARE EDUCATION/TRAINING PROGRAM

## 2024-12-13 PROCEDURE — 999N000065 XR CHEST 1 VIEW

## 2024-12-13 PROCEDURE — 88360 TUMOR IMMUNOHISTOCHEM/MANUAL: CPT | Mod: 26 | Performed by: PATHOLOGY

## 2024-12-13 PROCEDURE — 88360 TUMOR IMMUNOHISTOCHEM/MANUAL: CPT | Mod: TC | Performed by: INTERNAL MEDICINE

## 2024-12-13 PROCEDURE — 88305 TISSUE EXAM BY PATHOLOGIST: CPT | Mod: 26 | Performed by: PATHOLOGY

## 2024-12-13 PROCEDURE — 99152 MOD SED SAME PHYS/QHP 5/>YRS: CPT

## 2024-12-13 PROCEDURE — 88185 FLOWCYTOMETRY/TC ADD-ON: CPT | Performed by: INTERNAL MEDICINE

## 2024-12-13 PROCEDURE — 71045 X-RAY EXAM CHEST 1 VIEW: CPT | Mod: 26 | Performed by: RADIOLOGY

## 2024-12-13 PROCEDURE — 85610 PROTHROMBIN TIME: CPT

## 2024-12-13 PROCEDURE — 250N000011 HC RX IP 250 OP 636

## 2024-12-13 PROCEDURE — 88341 IMHCHEM/IMCYTCHM EA ADD ANTB: CPT | Mod: 26 | Performed by: PATHOLOGY

## 2024-12-13 PROCEDURE — 99153 MOD SED SAME PHYS/QHP EA: CPT

## 2024-12-13 RX ORDER — NALOXONE HYDROCHLORIDE 0.4 MG/ML
0.2 INJECTION, SOLUTION INTRAMUSCULAR; INTRAVENOUS; SUBCUTANEOUS
Status: DISCONTINUED | OUTPATIENT
Start: 2024-12-13 | End: 2024-12-13 | Stop reason: HOSPADM

## 2024-12-13 RX ORDER — NALOXONE HYDROCHLORIDE 0.4 MG/ML
0.4 INJECTION, SOLUTION INTRAMUSCULAR; INTRAVENOUS; SUBCUTANEOUS
Status: DISCONTINUED | OUTPATIENT
Start: 2024-12-13 | End: 2024-12-13 | Stop reason: HOSPADM

## 2024-12-13 RX ORDER — FENTANYL CITRATE 50 UG/ML
25-50 INJECTION, SOLUTION INTRAMUSCULAR; INTRAVENOUS EVERY 5 MIN PRN
Status: DISCONTINUED | OUTPATIENT
Start: 2024-12-13 | End: 2024-12-13 | Stop reason: HOSPADM

## 2024-12-13 RX ORDER — IBUPROFEN 400 MG/1
400 TABLET, FILM COATED ORAL EVERY 6 HOURS PRN
COMMUNITY

## 2024-12-13 RX ORDER — LIDOCAINE 40 MG/G
CREAM TOPICAL
Status: DISCONTINUED | OUTPATIENT
Start: 2024-12-13 | End: 2024-12-13 | Stop reason: HOSPADM

## 2024-12-13 RX ORDER — FLUMAZENIL 0.1 MG/ML
0.2 INJECTION, SOLUTION INTRAVENOUS
Status: DISCONTINUED | OUTPATIENT
Start: 2024-12-13 | End: 2024-12-13 | Stop reason: HOSPADM

## 2024-12-13 RX ADMIN — MIDAZOLAM 0.5 MG: 1 INJECTION INTRAMUSCULAR; INTRAVENOUS at 09:13

## 2024-12-13 RX ADMIN — LIDOCAINE HYDROCHLORIDE 10 ML: 10 INJECTION, SOLUTION EPIDURAL; INFILTRATION; INTRACAUDAL; PERINEURAL at 09:29

## 2024-12-13 RX ADMIN — MIDAZOLAM 0.5 MG: 1 INJECTION INTRAMUSCULAR; INTRAVENOUS at 09:23

## 2024-12-13 RX ADMIN — FENTANYL CITRATE 25 MCG: 50 INJECTION, SOLUTION INTRAMUSCULAR; INTRAVENOUS at 09:24

## 2024-12-13 RX ADMIN — FENTANYL CITRATE 25 MCG: 50 INJECTION, SOLUTION INTRAMUSCULAR; INTRAVENOUS at 09:12

## 2024-12-13 ASSESSMENT — ACTIVITIES OF DAILY LIVING (ADL)
ADLS_ACUITY_SCORE: 41

## 2024-12-13 NOTE — PRE-PROCEDURE
GENERAL PRE-PROCEDURE:   Procedure:  Image guided right lung nodule biopsy and possible chest tube  Date/Time:  12/13/2024 7:26 AM    Verbal consent obtained?: Yes    Written consent obtained?: Yes    Risks and benefits: Risks, benefits and alternatives were discussed    Consent given by:  Patient  Patient states understanding of procedure being performed: Yes    Patient's understanding of procedure matches consent: Yes    Procedure consent matches procedure scheduled: Yes    Expected level of sedation:  Moderate  Appropriately NPO:  Yes  ASA Class:  2  Mallampati  :  Grade 2- soft palate, base of uvula, tonsillar pillars, and portion of posterior pharyngeal wall visible  Lungs:  Lungs clear with good breath sounds bilaterally  Heart:  Normal heart sounds and rate  History & Physical reviewed:  History and physical reviewed and no updates needed  Statement of review:  I have reviewed the lab findings, diagnostic data, medications, and the plan for sedation

## 2024-12-13 NOTE — PROGRESS NOTES
Pt tolerated ambulation to BR. Able to void without complications. Discharge instructions reviewed with pt & pt's sister. Questions & concerns addressed. Procedure site stable; unchanged. Pt stable, meets discharge criteria.

## 2024-12-13 NOTE — DISCHARGE INSTRUCTIONS
[facility]    Interventional Radiology  Patient Instructions Following Lung Mass Biopsy    AFTER YOU GO HOME  If you were given sedation, for the first 24 hours: we recommend that an adult stay with you, DO NOT drive or operate machinery at home or at work, DO NOT smoke, DO NOT make any important or legal decisions.  DO NOT drink alcoholic beverages the day of your procedure  Drink plenty of fluids   Resume your regular diet, unless otherwise instructed by your Primary Physician  Relax and take it easy for 48 hours  DO NOT do any strenuous exercise or lifting (> 10 lbs) for at least 7 days following your procedure  There should be minimal drainage from the biopsy site  Keep the dressing dry and in place for 24 hours. After 24 hours, remove the dressing. You may shower at that time. Don't scrub the procedure site vigorously for 5 days. Re apply a band aid to the site for the next 2 days. Change daily and when necessary. Never leave a wet or dirty dressing in place.   No lotion or powder to the puncture site for 5 days.   No tub bath, hot tub, or swimming for 5 days.    CALL THE PHYSICIAN IF:  You start bleeding from the procedure site.  If you do start to bleed from that site, lie down flat and hold pressure on the site for a minimum of 10 minutes.  Your physician will tell you if you need to return to the hospital  You develop nausea or vomiting  You have excessive swelling, redness, or tenderness at the site  You have drainage that looks like it is infected.  You experience severe pain  You develop hives or a rash or unexplained itching  You develop shortness of breath  You develop a temperature of 101 degrees F or greater  You develop bloody clots or red urine after you are discharged  You develop chest pain or cough up blood, lightheadedness or fainting    ADDITIONAL INSTRUCTIONS: none     [facility] INTERVENTIONAL RADIOLOGY DEPARTMENT  Procedure Physician: Dr. Batista, Dr. Felton                                       Date of procedure: December 13, 2024    Telephone Numbers: [phone]      Monday-Friday 7:30 am to 4:00 pm  [phone] After 4:00 pm Monday-Friday, Weekends & Holidays. Ask the  to contact the Interventional Radiologist on call.  Someone is on call 24 hrs/day    [facility] toll free number: [phone]    Monday-Friday 8:00 am to 4:30 pm  [facility] Emergency Dept:  [phone]

## 2024-12-13 NOTE — PROGRESS NOTES
Akira returned from lung biopsy  R upper chest puncture site with tegaderm. No bleeding or hematoma  VSS and AxOx4. Denies pain and SOB  Eating and drinking  1 view CXR at 1055

## 2024-12-13 NOTE — PROGRESS NOTES
Pt arrived to [address] for image guided lung mass biopsy procedure in IR. AFVSS. Denies pain. Consent done. Labs processing. Pt's sister, Karime, at bedside; to drive pt home post procedure. Pt stable.

## 2024-12-13 NOTE — IR NOTE
Patient Name: Akira Plasencia  Medical Record Number: [id number]  Today's Date: 12/13/2024    Procedure: Image Guided Right Lung Mass Biopsy  Proceduralist: Dr. MITUL Batista, Dr. CHANELL Felton  Pathology present: No    Procedure Start: 0918  Procedure end: 0940    Sedation medications administered: 1 mg midazolam, 50 mcg fentanyl    Report given to: Vannesa MCMAHAN RN  : No    Other Notes: Pt arrived to IR room CT 2 from . Consent reviewed. Pt denies any questions or concerns regarding procedure. Pt positioned supine and monitored per protocol. Pt tolerated procedure without any noted complications. Pt transferred back to .

## 2024-12-13 NOTE — PROCEDURES
[facility]    Procedure: IR Procedure Note    Date/Time: 12/13/2024 9:54 AM    Performed by: Roberto Batista MD  Authorized by: Roberto Batista MD  IR Fellow Physician:  Radiology Resident Physician: Julio Felton    Pre Procedure Diagnosis: R lung mass  Post Procedure Diagnosis: same    UNIVERSAL PROTOCOL   Site Marked: Yes  Prior Images Obtained and Reviewed:  Yes  Required items: Required blood products, implants, devices and special equipment available    Patient identity confirmed:  Arm band, provided demographic data, hospital-assigned identification number and verbally with patient  Patient was reevaluated immediately before administering moderate or deep sedation or anesthesia  Confirmation Checklist:  Correct equipment/implants were available, procedure was appropriate and matched the consent or emergent situation, relevant allergies and patient's identity using two indicators  Time out: Immediately prior to the procedure a time out was called    Universal Protocol: the Joint Commission Universal Protocol was followed    Preparation: Patient was prepped and draped in usual sterile fashion    ESBL (mL):  5     ANESTHESIA    Anesthesia:  Local infiltration  Local Anesthetic:  Lidocaine 1% without epinephrine  Anesthetic Total (mL):  15      SEDATION  Patient Sedated: Yes    Sedation Type:  Moderate (conscious) sedation  Sedation:  Fentanyl and midazolam  Vital signs: Vital signs monitored during sedation    See dictated procedure note for full details.  Findings: RUL/central lung mass  R lower lobe nodule    Specimens: none    Procedural Complications: None    Condition: Stable    Plan: Post procedure scan demonstrated no significant bleeding or pneumothorax:  - Complete 1 hour post procedure upright chest xray  - 2 hour bedrest  - Discharge when ready      PROCEDURE  Describe Procedure: CT guided RUL/central lung mass biopsy  4 cores in formalin,  1 core in RPMI sent for analysis  Patient Tolerance:  Patient tolerated the procedure well with no immediate complications  Length of time physician/provider present for 1:1 monitoring during sedation:  0-22 min

## 2024-12-13 NOTE — PROGRESS NOTES
Dr. Altamirano notified CXR completed. CXR read and pt cleared to discharge after meeting other discharge criteria. Pt stable.    HR=47 bpm, ZEXS=008/58 mmhg, FzO0=701.0 %, Resp=13 B/min, EtCO2=28 mmHg, Apnea=3 Seconds, Pain=0, Solange=10, Preciado=2

## 2024-12-17 ENCOUNTER — CARE COORDINATION (OUTPATIENT)
Dept: PULMONOLOGY | Facility: CLINIC | Age: 64
End: 2024-12-17
Payer: COMMERCIAL

## 2024-12-17 LAB
PATH REPORT.COMMENTS IMP SPEC: ABNORMAL
PATH REPORT.COMMENTS IMP SPEC: YES
PATH REPORT.FINAL DX SPEC: ABNORMAL
PATH REPORT.GROSS SPEC: ABNORMAL
PATH REPORT.MICROSCOPIC SPEC OTHER STN: ABNORMAL
PATH REPORT.MICROSCOPIC SPEC OTHER STN: ABNORMAL
PATH REPORT.RELEVANT HX SPEC: ABNORMAL
PHOTO IMAGE: ABNORMAL

## 2024-12-17 NOTE — PROGRESS NOTES
Path results forwarded to Dr. Hutchinson.     12/18 - Dr. Hutchinson updated patient. Planning for brain MRI + Thoracic surgery referral (placed). Had recent PET and PFTs.       Surgical pathology exam - Lung Lesion Biopsy: [id number]  Order: [id number]  Status: Final result       Visible to patient: Yes (not seen)    1 Result Note       Component  Ref Range & Units  Resulting Agency   Case Report   Surgical Pathology Report                         Case: [id number]                                   Authorizing Provider:  Alhaji Hutchinson MD  Collected:           12/13/2024 09:44 AM           Ordering Location:     [facility]     Received:            12/13/2024 10:15 AM                                  [address] [place]                                                             Pathologist:           James Cash MD                                                   Specimen:    Lung, Right, Lung biopsy                                                                  Final Diagnosis   LUNG, RIGHT, BIOPSY:  -NON-SMALL CELL LUNG CARCINOMA, FAVOR ADENOCARCINOMA, well-differentiated with lepidic (in-situ) growth pattern, developing within a scar, see comment.

## 2024-12-18 ENCOUNTER — TELEPHONE (OUTPATIENT)
Dept: PULMONOLOGY | Facility: CLINIC | Age: 64
End: 2024-12-18

## 2024-12-18 DIAGNOSIS — C34.90 PRIMARY LUNG ADENOCARCINOMA (H): Primary | ICD-10-CM

## 2024-12-18 PROCEDURE — G0452 MOLECULAR PATHOLOGY INTERPR: HCPCS | Mod: 26 | Performed by: STUDENT IN AN ORGANIZED HEALTH CARE EDUCATION/TRAINING PROGRAM

## 2024-12-18 PROCEDURE — 81456 SO/HL 51/>GSAP RNA ALYS: CPT | Performed by: INTERNAL MEDICINE

## 2024-12-18 NOTE — TELEPHONE ENCOUNTER
IP Note    CT guided biopsy is positive for NSCLCA-favoring adeno. Discussed the findings with the patient.    PET scan was done recently. Has PFTs from August 2024.    Will get head MRI and refer him to thoracic surgery.    Patient is in agreement with the plan.    Chris Hutchinson MD  
0

## 2024-12-19 ENCOUNTER — PATIENT OUTREACH (OUTPATIENT)
Dept: ONCOLOGY | Facility: CLINIC | Age: 64
End: 2024-12-19
Payer: COMMERCIAL

## 2024-12-19 NOTE — PROGRESS NOTES
New Patient Oncology Nurse Navigator Note     Referring provider: Dr. Chris Hutchinson    Referring Clinic/Organization: [facility]  Referred to: Thoracic Surgery  Requested provider (if applicable): First available - did not specify   Referral Received: 12/19/24       Evaluation for :   Diagnosis   C34.90 (ICD-10-CM) - Primary lung adenocarcinoma (H)      Additional Information:  + NSCLC favor adeno  Clinical History (per Nurse review of records provided):      08/06/2024 PFTs done (bookmarked)    11/20/2024 PET (bookmarked) showed:   IMPRESSION:      1. Right upper lobe consolidative masslike opacity demonstrates patchy  heterogeneous areas increased FDG metabolism. The most FDG avid area  is in the posterior inferior aspect of the mass, close to the fissure.  This could be targeted for biopsy.   Size wise the mass is increased since 5/24/2024.     2. Moderately FDG avid right upper paratracheal 2 closely adjacent  lymph nodes, slightly larger since 5/24/2024.      3. Evidence of sequela of prior granulomatous disease.      4. Moderate focal uptake in the gastric antrum. This could be seen  with inflammatory process. Clinical correlation is advised.      12/13/2024 Surgical Pathology (bookmarked) showed:   Final Diagnosis   LUNG, RIGHT, BIOPSY:  -NON-SMALL CELL LUNG CARCINOMA, FAVOR ADENOCARCINOMA, well-differentiated with lepidic (in-situ) growth pattern, developing within a scar, see comment.     Special Stains  [facility]   RESULT FOR IMMUNOHISTOCHEMICAL VENTANA CLONE  PD-L1 ASSAY  TUMOR PROPORTION SCORE (TPS):  <1%  INTERPRETATION: NEGATIVE PD-L1 EXPRESSION (TPS <1%)       Clinical Assessment / Barriers to Care (Per Nurse):  Tobacco History    Smoking Status  Every Day Smoking Tobacco Type  Cigarettes     Records Location: [facility]   Records Needed: None  Additional testing needed prior to consult: Brain MRI    JENNIFFER CaballeroN, RN, OCN  [facility] Oncology Nurse Navigator  [phone] /  [phone]

## 2024-12-30 NOTE — TELEPHONE ENCOUNTER
Imaging DISC Received  December 31, 2024 1:20 PM AF   Action: Imaging disc from [facility] received and taken to [facility] for upload.       RECORDS STATUS - ALL OTHER DIAGNOSIS      RECORDS RECEIVED FROM: [facility]   DATE RECEIVED: 1/2   NOTES STATUS DETAILS   OFFICE NOTE from referring provider Epic Dr. Mane Dincer: 11/5/24   OPERATIVE REPORT Epic/[facility] - [facility]  8/26/24: [facility]choscopy    [facility]  5/[facility], 2/20/18: Colonoscopy   MEDICATION LIST [facility]    LABS     PATHOLOGY REPORTS [facility] 12/13/24: [id number]  8/26/24: [id number]   ANYTHING RELATED TO DIAGNOSIS Epic 12/13/24   GENONOMIC TESTING     TYPE: Epic 12/17/24: NGS   IMAGING (NEED IMAGES & REPORT)     CT SCANS PACS 7/25/24: [facility]   IMAGE DISC [employer] TRACKING   TRACKING #: [id number]

## 2025-01-06 ENCOUNTER — TUMOR CONFERENCE (OUTPATIENT)
Dept: SURGERY | Facility: CLINIC | Age: 65
End: 2025-01-06
Payer: COMMERCIAL

## 2025-01-06 NOTE — TUMOR CONFERENCE
Thoracic Tumor Conference      Patient Name: Akira Plasencia    Reason for conference discussion (brief overview): 64 yr old male, 50 pack yr hx of smoking, currently trying to quit with the help of nicotine patches. patient who presents with a newly diagnosed right upper lobe lung adenocarcinoma. He had pneumonia (no fever or productive cough) in April 2024 and had a persistent right upper lobe lung consolidation. His energy levels are good. His weight is stable.    Brain MRI 1/7/25: No evidence of metastasis       IE Bx 12/13/24:   Final Diagnosis   LUNG, RIGHT, BIOPSY:  -NON-SMALL CELL LUNG CARCINOMA, FAVOR ADENOCARCINOMA, well-differentiated with lepidic (in-situ) growth pattern, developing within a scar, see comment.     PET Scan 11/20/24:   FINDINGS:   BACKGROUND: Liver SUV max = 2.4, Aorta Blood SUV max = 2.0.      HEAD/NECK:  No suspicious uptake.     CHEST:  Calcified nodes in the right suprahilar, and precarinal regions. In  the region of the right suprahilar calcified nodes there is focal FDG  uptake, max SUV 5.5. This could be due to overcorrection artifact.   In the right upper lobe anterior segment consolidation there are  heterogeneous scattered areas of increased uptake, measuring up to  SUVmax of 8.4. The most avid area is lower peripheral aspect of the  mass near the fissure. This masslike consolidation measures about 7.7  x 4.6 cm and extends to involve and cause retraction of the fissure  which is a new finding since 5/24/24. The mass like consolidation has  significantly increased in size since 5/24/2024.      There are 2 right upper paratracheal FDG avid nodes. The larger with  max SUV of 4.1 and measures 1.2 cm. They are larger since 5/24/24.   There is myocardial FDG uptake could be due to inadequate fasting or  right heart strain.  7 mm right lower lobe peripheral nodule.      ABDOMEN AND PELVIS:  Focal FDG uptake by the gastric antrum, max SUV 5.3.   No other abnormal uptake.   Calcified  granulomata of the spleen.     LOWER EXTREMITIES:   No abnormal uptake.      BONES AND SOFT TISSUES:   No abnormal uptake. Degenerative changes of the cervical spine where  there are endplate sclerotic changes at C5-C6 and anterolisthesis of  C4 on C5.                                                                      IMPRESSION:      1. Right upper lobe consolidative masslike opacity demonstrates patchy  heterogeneous areas increased FDG metabolism. The most FDG avid area  is in the posterior inferior aspect of the mass, close to the fissure.  This could be targeted for biopsy.   Size wise the mass is increased since 5/24/2024.     2. Moderately FDG avid right upper paratracheal 2 closely adjacent  lymph nodes, slightly larger since 5/24/2024.      3. Evidence of sequela of prior granulomatous disease.      4. Moderate focal uptake in the gastric antrum. This could be seen  with inflammatory process. Clinical correlation is advised.      I have personally reviewed the examination and initial interpretation  and I agree with the findings.     CHERI MENEZES MD       Bronchoscopy 8/26/24:   Final Diagnosis   A. LUNG, RIGHT UPPER LOBE, ANTERIOR SEGMENT, ENDOBRONCHIAL BIOPSY:  - Fragments of benign bronchial wall mucosa with chronic inflammation  - No evidence of malignancy    PFT 8/6/24: FEV1: 80% predicted, 2.16L, DLCO: 99%    Specific Question:  Chemoimmunotherapy prior to surgery?    Pertinent Histology:  Non Small Cell Lug Carcinoma    Referring Physician: Dr. Jonathan Blanton    The patient's case was presented at the multidisciplinary conference for the above noted reason.  There was a consensus recommendation for the following actions:     Chemoradiation and no surgical resection. Patient was seen by Dr. Romo and a radiation oncology referral was placed.          Case Lead:  Dr. Jonathan Blanton    Interventional Radiology Staff Present: N/A          Documentation / Disclaimer Cancer Tumor Board Note  Cancer tumor  board recommendations do not override what is determined to be reasonable care and treatment, which is dependent on the circumstances of a patient's case; the patient's medical, social, and personal concerns; and the clinical judgment of the oncologist [physician].

## 2025-01-07 ENCOUNTER — ANCILLARY PROCEDURE (OUTPATIENT)
Dept: MRI IMAGING | Facility: CLINIC | Age: 65
End: 2025-01-07
Attending: INTERNAL MEDICINE
Payer: COMMERCIAL

## 2025-01-07 ENCOUNTER — PATIENT OUTREACH (OUTPATIENT)
Dept: ONCOLOGY | Facility: CLINIC | Age: 65
End: 2025-01-07

## 2025-01-07 ENCOUNTER — PRE VISIT (OUTPATIENT)
Dept: SURGERY | Facility: CLINIC | Age: 65
End: 2025-01-07

## 2025-01-07 ENCOUNTER — TUMOR CONFERENCE (OUTPATIENT)
Dept: ONCOLOGY | Facility: CLINIC | Age: 65
End: 2025-01-07
Payer: COMMERCIAL

## 2025-01-07 ENCOUNTER — ONCOLOGY VISIT (OUTPATIENT)
Dept: ONCOLOGY | Facility: CLINIC | Age: 65
End: 2025-01-07
Attending: INTERNAL MEDICINE
Payer: MEDICARE

## 2025-01-07 ENCOUNTER — ONCOLOGY VISIT (OUTPATIENT)
Dept: SURGERY | Facility: CLINIC | Age: 65
End: 2025-01-07
Attending: INTERNAL MEDICINE
Payer: MEDICARE

## 2025-01-07 VITALS
SYSTOLIC BLOOD PRESSURE: 128 MMHG | BODY MASS INDEX: 26.79 KG/M2 | RESPIRATION RATE: 20 BRPM | WEIGHT: 160.8 LBS | TEMPERATURE: 97.6 F | HEIGHT: 65 IN | OXYGEN SATURATION: 99 % | HEART RATE: 104 BPM | DIASTOLIC BLOOD PRESSURE: 78 MMHG

## 2025-01-07 VITALS
SYSTOLIC BLOOD PRESSURE: 128 MMHG | OXYGEN SATURATION: 99 % | TEMPERATURE: 97.6 F | WEIGHT: 160.72 LBS | RESPIRATION RATE: 20 BRPM | DIASTOLIC BLOOD PRESSURE: 78 MMHG | HEART RATE: 104 BPM | BODY MASS INDEX: 26.78 KG/M2

## 2025-01-07 DIAGNOSIS — C34.91 PRIMARY LUNG ADENOCARCINOMA, RIGHT (H): Primary | ICD-10-CM

## 2025-01-07 DIAGNOSIS — C34.90 PRIMARY LUNG ADENOCARCINOMA (H): ICD-10-CM

## 2025-01-07 PROCEDURE — G0463 HOSPITAL OUTPT CLINIC VISIT: HCPCS | Performed by: THORACIC SURGERY (CARDIOTHORACIC VASCULAR SURGERY)

## 2025-01-07 PROCEDURE — 99203 OFFICE O/P NEW LOW 30 MIN: CPT | Performed by: THORACIC SURGERY (CARDIOTHORACIC VASCULAR SURGERY)

## 2025-01-07 RX ORDER — GADOBUTROL 604.72 MG/ML
7.5 INJECTION INTRAVENOUS ONCE
Status: COMPLETED | OUTPATIENT
Start: 2025-01-07 | End: 2025-01-07

## 2025-01-07 RX ADMIN — GADOBUTROL 7 ML: 604.72 INJECTION INTRAVENOUS at 12:32

## 2025-01-07 ASSESSMENT — PAIN SCALES - GENERAL
PAINLEVEL_OUTOF10: NO PAIN (0)
PAINLEVEL_OUTOF10: NO PAIN (0)

## 2025-01-07 NOTE — NURSING NOTE
"Oncology Rooming Note    January 7, 2025 2:59 PM   Akira Plasencia is a 64 year old male who presents for:    Chief Complaint   Patient presents with    Oncology Clinic Visit     New eval Primary lung adenocarcinoma      Initial Vitals: /78 (BP Location: Right arm, Patient Position: Sitting, Cuff Size: Adult Regular)   Pulse 104   Temp 97.6  F (36.4  C) (Oral)   Resp 20   Ht 1.65 m (5' 4.96\")   Wt 72.9 kg (160 lb 12.8 oz)   SpO2 99%   BMI 26.79 kg/m   Estimated body mass index is 26.79 kg/m  as calculated from the following:    Height as of this encounter: 1.65 m (5' 4.96\").    Weight as of this encounter: 72.9 kg (160 lb 12.8 oz). Body surface area is 1.83 meters squared.  No Pain (0) Comment: Data Unavailable   No LMP for male patient.  Allergies reviewed: Yes  Medications reviewed: Yes    Medications: Medication refills not needed today.  Pharmacy name entered into [facility]: [facility] [address] [place] [address] [address]    Frailty Screening:   Is the patient here for a new oncology consult visit in cancer care? 1. Yes. Over the past month, have you experienced difficulty or required a caregiver to assist with:   1. Balance, walking or general mobility (including any falls)? NO  2. Completion of self-care tasks such as bathing, dressing, toileting, grooming/hygiene?  NO  3. Concentration or memory that affects your daily life?  NO       Clinical concerns: none      Danelle Oconnell"
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
## 2025-01-07 NOTE — Clinical Note
1/7/2025      Akira Plasencia  [address] [place] [address]      Dear Colleague,    Thank you for referring your patient, Akira Plasencia, to the [facility]. Please see a copy of my visit note below.    No notes on file    Again, thank you for allowing me to participate in the care of your patient.        Sincerely,        Aamir Romo, DO    Electronically signed

## 2025-01-07 NOTE — DISCHARGE INSTRUCTIONS
MRI Contrast Discharge Instructions    The IV contrast you received today will pass out of your body in your  urine. This will happen in the next 24 hours. You will not feel this process.  Your urine will not change color.    Drink at least 4 extra glasses of water or juice today (unless your doctor  has restricted your fluids). This reduces the stress on your kidneys.  You may take your regular medicines.    If you are on dialysis: It is best to have dialysis today.    If you have a reaction: Most reactions happen right away. If you have  any new symptoms after leaving the hospital (such as hives or swelling),  call your hospital at the correct number below. Or call your family doctor.  If you have breathing distress or wheezing, call 911.    Special instructions: ***    I have read and understand the above information.    Signature:______________________________________ Date:___________    Staff:__________________________________________ Date:___________     Time:__________    [place] [facility] Departments:    ___[place]: [phone]  ___[facility]: [phone]  ___[place]: [phone] ___[facility]: [phone]  ___[facility]: [phone]  ___[facility]: [phone]  ___[phone]  ___[facility]: [phone]  ___[phone]

## 2025-01-07 NOTE — PROGRESS NOTES
New Patient Oncology Nurse Navigator Note     Referring provider: Dr. Aamir Romo    Referring Clinic/Organization: [facility]  Referred to: Radiation Oncology [facility] - [place]: [phone]     Requested provider (if applicable): First available - did not specify   Referral Received: 01/07/25       Evaluation for :   Diagnosis   C34.91 (ICD-10-CM) - Primary lung adenocarcinoma, right (H)        Clinical History (per Nurse review of records provided):      [facility] showed:   IMPRESSION:      1. Right upper lobe consolidative masslike opacity demonstrates patchy  heterogeneous areas increased FDG metabolism. The most FDG avid area  is in the posterior inferior aspect of the mass, close to the fissure.  This could be targeted for biopsy.   Size wise the mass is increased since 5/24/2024.     2. Moderately FDG avid right upper paratracheal 2 closely adjacent  lymph nodes, slightly larger since 5/24/2024.      3. Evidence of sequela of prior granulomatous disease.      4. Moderate focal uptake in the gastric antrum. This could be seen  with inflammatory process. Clinical correlation is advised.     12/13/2024 Surgical Pathology (bookmarked) showed:   Final Diagnosis   LUNG, RIGHT, BIOPSY:  -NON-SMALL CELL LUNG CARCINOMA, FAVOR ADENOCARCINOMA, well-differentiated with lepidic (in-situ) growth pattern, developing within a scar, see comment.   Electronically signed by James Cash MD on 12/17/2024 at 12:50 PM     Clinical Assessment / Barriers to Care (Per Nurse):  Tobacco History    Smoking Status  Every Day Smoking Tobacco Type  Cigarettes     Previous Radiation Therapy: No records found within [facility]  Records Location: [facility]   Records Needed: None  Additional testing needed prior to consult: None  Referral updates and Plan:     JENNIFFER CaballeroN, RN, OCN  [facility] Oncology Nurse Navigator  [phone] / [phone]

## 2025-01-07 NOTE — PROGRESS NOTES
"REASON FOR VISIT:    CANCER STAGE:  Cancer Staging   No matching staging information was found for the patient.        HISTORY OF PRESENT ILLNESS:    Akira Plasencia is a 63 yo man who was referred to Dr. Hutchinson for an abnormal CT.  He was treated for a pnuemonia in April 2024.  At that time, he was treated with antibiotics, but he had a repeat CT that showed residual RUL consolidation.  He underwent bronchoscopy on 8/26/24 that showed endobronchial lesion in RUL.  He had a biopsy that was non-diagnostic.   Pet scan on 11/20/24 showed RUL mass with FDG avidity.  There was FDG avid R Upper paratracheal node.     Review Of Systems  10-point review of systems were negative except as noted in HPI.        EXAM:  Blood pressure 128/78, pulse 104, temperature 97.6  F (36.4  C), temperature source Oral, resp. rate 20, weight 72.9 kg (160 lb 11.5 oz), SpO2 99%.  GEN: alert and oriented x 3, nad  HEENT: perrla, eomi, sclera anicteric, oral mucosa moist without thrush  NECK: supple, no palpable LAD  HT: reg rate and rhythm, no murmurs  LUNGS: clear to auscultation bilaterally  ABD: soft, nt, nd, +bs x 4  EXT: no clubbing, cyanosis, or edema  NEURO: CN 2-12 intact, MS 5/5 b/l    Current Outpatient Medications   Medication Sig Dispense Refill    ibuprofen (ADVIL/MOTRIN) 400 MG tablet Take 400 mg by mouth every 6 hours as needed for moderate pain. (Patient not taking: Reported on 1/7/2025)      UNABLE TO FIND Take 2 capsules by mouth daily as needed (\"for leg cramps\"). MEDICATION NAME: \"Cramp Defense (magnesium)\" (Patient not taking: Reported on 1/7/2025)             Recent Labs   Lab Test 12/13/24  0732   WBC 9.5   HGB 14.3        @labrcent[na,potassium,chloride,co2,bun,cr@  No results for input(s): \"PROTTOTAL\", \"ALBUMIN\", \"BILITOTAL\", \"AST\", \"ALT\", \"ALKPHOS\" in the last 31292 hours.      Recent Results (from the past 744 hours)   CT Lung Mediastinum Biopsy    Narrative    PROCEDURE: CT-guided biopsy of right lung " "mass  DATE: 12/13/2024 9:51 AM    RADIOLOGIST:  Dr. Anuradha Batista    INDICATION: Increase in size of FDG avid right lung mass.     MEDICATIONS: Versed 1 mg IV, Fentanyl 50 mcg IV. Continuous monitoring  of intravenous conscious sedation was performed by the Radiology  nurse. Vital signs throughout the procedure remained stable.  Total  time of sedation is 32 minutes.    PROCEDURE/FINDINGS: Informed consent was obtained. Patient is brought  into the CT scanner and placed supine on the CT table.    Initial preprocedure localizing CT was performed demonstrating right  lung mass . Area for percutaneous biopsy was prepped and draped  sterilely. 1% lidocaine was administered for local anesthetic. The  safest area correlating with FDG activity on the recent PET scan was  targeted. A 19 gauge guiding needle from a Smart Panel biopsy set was  advanced into the lower portion of the mass.  5 x 20 gauge core  samples were obtained. Pathology refused the request for rapid on site  evaluation due to the case not meeting their criteria (\"lesion size >3  cm\").  For any questions regarding this criteria, please contact Dr. Lyons of Pathology directly.  The needle was removed with blood  patch applied and the access site was dressed using sterile materials.    Postprocedure CT demonstrated no immediate postprocedure complications  .    ESTIMATED BLOOD LOSS: Minimal.    COMPLICATIONS: No immediate complications.      Impression    IMPRESSION:  Core biopsy of enlarging right lung mass.    ANURADHA BATISTA MD         SYSTEM ID:  [id number]   XR Chest 1 View    Narrative    EXAM: XR CHEST 1 VIEW 12/13/2024 11:02 AM    DEMOGRAPHICS: 64 years Male    INDICATION: post lung biopsy    COMPARISON: Same day biopsy CT at 8:59 AM and CT chest 11/29/2024    TECHNIQUE: Single AP view of the chest.    FINDINGS:   The trachea is midline. Stable cardiomegaly. No appreciable  pneumothorax. Persistent dense opacity in the right mid lung.  Persistent " right hilar calcifications. Costophrenic angles are sharp.    No acute osseous abnormalities. The visualized upper abdomen is  unremarkable.      Impression    IMPRESSION:   1.  No postoperative pneumothorax.  2.  Persistent dense opacity in the right midlung.    I have personally reviewed the examination and initial interpretation  and I agree with the findings.    JUAN DIEGO PEACE MD         SYSTEM ID:  [id number]   MR Brain w/o & w Contrast    Narrative     MR BRAIN W/O & W CONTRAST 1/7/2025 1:02 PM    Provided History: looking for distant mets; Primary lung  adenocarcinoma (H).  ICD-10: Primary lung adenocarcinoma (H)    Comparison: None.    Technique: Multiplanar T1-weighted, axial FLAIR, and susceptibility  images were obtained without intravenous contrast. Following  intravenous gadolinium-based contrast administration, axial  T2-weighted, diffusion, and T1-weighted images (in multiple planes)  were obtained.    Contrast: 7.0mL Gadavist     Findings:  There is no mass effect, midline shift, or evidence of intracranial  hemorrhage. The ventricles are proportionate to the cerebral sulci.  Normal major vascular intracranial flow-voids. Mild generalized  parenchymal volume loss. Scattered T2 hyperintense white matter foci,  most likely represents chronic small vessel ischemic disease.    Postcontrast images demonstrate no abnormal intracranial enhancement.    No abnormality of the skull marrow signal. The visualized portions of  paranasal sinuses, and mastoid air cells are relatively clear. The  orbits are grossly unremarkable.      Impression    Impression: No evidence for intracranial metastatic disease.    VARGHESE LOVE MD         SYSTEM ID:  [id number]           Assessment/Plan    I spent *** minutes with the patient, reviewing imaging, and documenting this note on the date of the encounter.     Aamir Romo   of Medicine  Division of Hematology, Oncology, and Transplantation
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
## 2025-01-07 NOTE — PROGRESS NOTES
[facility]: Cancer Care                                                                                          Met with Akira after his appt with Dr. Romo in clinic.  We will work on scheduling radiation.  Since he [place] west of [facility], we discussed the option of staying at [facility] during radiation.   He will consider this option. Contact information provided to Akira, encouraged him or his family to reach out with questions.     Viktoriya Herman RN

## 2025-01-08 DIAGNOSIS — C34.91 PRIMARY LUNG ADENOCARCINOMA, RIGHT (H): Primary | ICD-10-CM

## 2025-01-08 NOTE — TELEPHONE ENCOUNTER
RECORDS STATUS - ALL OTHER DIAGNOSIS      RECORDS RECEIVED FROM: [facility] - Internal records   DATE RECEIVED: 1/8

## 2025-01-10 NOTE — PROGRESS NOTES
Radiation Oncology Consultation Note    Name: Akira Plasencia MRN: [id number]   : 1960   Date of Service: 2025 Referring: Dr. Romo     Reason for consultation: 64-year-old male with a PMH of 50-pack-year smoking history, CAD, & COPD with a newly diagnosed stage IIIb (T3 (greatest dimension 5.8 cm) N2 (ipsilateral paratracheal lymph node involvement) M0) adenocarcinoma of the RUL      Oncologic History:   2024: Patient treated for pneumonia for consolidation in RUL    2024, low-dose lung cancer screening:  - Masslike opacity in RUL.  4.9 x 4.2 cm.  - RLL pulmonary nodule (axial image 75).  0.7 x 0.6 cm.    2024, CT chest with contrast:  - Abnormal lesion in the RUL and RML.  6.9 x 6.3 x 4.1 cm.    2024, PFTs:      Latest Ref Rng & Units 2024     7:18 AM   PFT   FVC L 3.17    FEV1 L 2.09    FVC% % 93    FEV1% % 78        2024, endobronchial biopsy:  - RUL: No evidence of malignancy    2024, CT chest:  - Enlarging right suprahilar mass.  5.2 x 5.8 x 2.7 cm, contacting pleura.  Increased spiculated inferolateral extension.  - Increase size of enlarged paratracheal mediastinal nodes: 12 mm short axis, previously 9 mm (3/62).  - Multiple calcified mediastinal and right hilar lymph nodes.    2024, PET/CT:  - Right suprahilar calcified nodes.  SUV max 5.5.  - RUL anterior segment consolidation.  7.7 x 4.6 cm.  SUV max 8.4.  - 2 right upper paratracheal nodes.  1.2 cm, SUV max 4.1.  - Focal FDG uptake in the gastric antrum.  SUV max 5.3.      2024, right lung biopsy:  - Pathology: NSCLC, favoring adenocarcinoma.  Well-differentiated with lipidic (in situ) growth pattern developing within the scar.  TPS <1%.  (-) PD-L1 expression  - Flow cytometry: Polytypic B cells.  No immunophenotypic evidence of B-cell Hodgkin lymphoma.  - NGS lung panel (-) for any pathologically identified mutation effusions    2025, thoracic tumor conference:  - Consensus:  Chemoradiotherapy and no surgical resection    1/7/2025, brain MRI: No evidence of intracranial metastatic disease    1/7/2025, cardiothoracic surgery consult:  - Patient is not a surgical candidate    1/7/2025, medical oncology consult:  - Recommend chemoradiotherapy with weekly carboplatin/paclitaxel followed by a year of durvalumab as specified by the [place] trial  - Notable social history includes that the patient lives in [place] of the [facility] and that 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  The patient denies any issues with his breathing and denies hemoptysis, EDEN, productive cough, or pain with inspiration.  The patient lives in [facility], which is approximately [place].5 hours away from the [facility].  The patient and his family members had multiple questions about chemoradiotherapy.      Review of Systems   A 10-point review of systems was performed. Pertinent findings are noted in the HPI.  Physical Exam   ECOG Status: 1    Vitals: There were no vitals taken for this visit.  Gen: Alert, in NAD  Head: NC/AT  Eyes: PERRL, EOMI, sclera anicteric  Ears: No external auricular lesions  Nose/sinus: No rhinorrhea or epistaxis  Pulm: No wheezing, stridor or respiratory distress.  Lungs clear to auscultation in all lung fields.  CV: Extremities are warm and well-perfused, no cyanosis, no pedal edema  Musculoskeletal: Normal bulk and tone  Skin: Normal color and turgor. No rashes or lesions.  Neuro: A/Ox3, CN II-XII intact, normal gait    Imaging/Path/Labs   Imaging: As above  Path: As above  Labs: As above  Assessment & Plan   64-year-old male with stage IIIb (T3 (greatest dimension 5.8 cm) N2  (ipsilateral paratracheal lymph node involvement) M0) adenocarcinoma of the RUL      The current standard of care for advanced stage NSCLC that is unresectable is concurrent radiotherapy consisting of 60 Gray/30 fractions with concurrent platinum/paclitaxel as supported by RTOG 0617 (PMID: 65408377) and RTOG 9410 (PMID: 09239151) in addition to adjuvant durvalumab for 1 year as supported by the [place] trial (PMID: 06377643).  Per CALGB 8433 (PMID: 9083357), sequential chemo-RT had modestly better survival outcomes compared to thoracic RT alone but at a cost of increased treatment-related morbidity.  Later trials then showed concurrent chemotherapy to be superior over induction chemotherapy.    The risks and benefits of radiotherapy were discussed in detail with the patient.  The patient expressed understanding.      All questions answered to verbalized satisfaction.  Informed consent was obtained. The patient subsequently proceeded to CT Simulation.    Pertinent radiological and pathologic data was personally reviewed and discussed with the patient.     The patient understood the plan, was in agreement, and expressed gratitude for their care.    We instructed the patient to call with any questions or concerns in the interim.      Patient was seen and discussed with my attending physician, Dr. Lunsford.    Yann Sanchez MD, MS PGY4  Radiation Oncology  Department of Radiation Oncology  [facility]  Phone: [phone]

## 2025-01-13 ENCOUNTER — OFFICE VISIT (OUTPATIENT)
Dept: RADIATION ONCOLOGY | Facility: CLINIC | Age: 65
End: 2025-01-13
Attending: INTERNAL MEDICINE
Payer: MEDICARE

## 2025-01-13 ENCOUNTER — PRE VISIT (OUTPATIENT)
Dept: RADIATION ONCOLOGY | Facility: CLINIC | Age: 65
End: 2025-01-13
Payer: COMMERCIAL

## 2025-01-13 ENCOUNTER — HOSPITAL ENCOUNTER (OUTPATIENT)
Dept: CT IMAGING | Facility: CLINIC | Age: 65
Discharge: HOME | End: 2025-01-13
Attending: STUDENT IN AN ORGANIZED HEALTH CARE EDUCATION/TRAINING PROGRAM | Admitting: STUDENT IN AN ORGANIZED HEALTH CARE EDUCATION/TRAINING PROGRAM
Payer: MEDICARE

## 2025-01-13 DIAGNOSIS — C34.91 PRIMARY LUNG ADENOCARCINOMA, RIGHT (H): ICD-10-CM

## 2025-01-13 PROCEDURE — 77014 CT THERAPY CORRELATE: CPT

## 2025-01-13 PROCEDURE — 99205 OFFICE O/P NEW HI 60 MIN: CPT | Mod: 25 | Performed by: STUDENT IN AN ORGANIZED HEALTH CARE EDUCATION/TRAINING PROGRAM

## 2025-01-13 PROCEDURE — 77334 RADIATION TREATMENT AID(S): CPT | Mod: 26 | Performed by: STUDENT IN AN ORGANIZED HEALTH CARE EDUCATION/TRAINING PROGRAM

## 2025-01-13 PROCEDURE — 77470 SPECIAL RADIATION TREATMENT: CPT | Performed by: STUDENT IN AN ORGANIZED HEALTH CARE EDUCATION/TRAINING PROGRAM

## 2025-01-13 PROCEDURE — 77334 RADIATION TREATMENT AID(S): CPT | Performed by: STUDENT IN AN ORGANIZED HEALTH CARE EDUCATION/TRAINING PROGRAM

## 2025-01-13 PROCEDURE — 250N000011 HC RX IP 250 OP 636: Performed by: STUDENT IN AN ORGANIZED HEALTH CARE EDUCATION/TRAINING PROGRAM

## 2025-01-13 PROCEDURE — 77470 SPECIAL RADIATION TREATMENT: CPT | Mod: 26 | Performed by: STUDENT IN AN ORGANIZED HEALTH CARE EDUCATION/TRAINING PROGRAM

## 2025-01-13 PROCEDURE — G0463 HOSPITAL OUTPT CLINIC VISIT: HCPCS | Performed by: STUDENT IN AN ORGANIZED HEALTH CARE EDUCATION/TRAINING PROGRAM

## 2025-01-13 RX ORDER — IOPAMIDOL 755 MG/ML
79 INJECTION, SOLUTION INTRAVASCULAR ONCE
Status: COMPLETED | OUTPATIENT
Start: 2025-01-13 | End: 2025-01-13

## 2025-01-13 RX ADMIN — IOPAMIDOL 79 ML: 755 INJECTION, SOLUTION INTRAVENOUS at 14:36

## 2025-01-13 NOTE — LETTER
2025      Akira Plasencia  [address] [place] [address]      Dear Colleague,    Thank you for referring your patient, Akira Plasencia, to the [facility] RADIATION ONCOLOGY. Please see a copy of my visit note below.    Radiation Oncology Consultation Note    Name: Akira Plasencia MRN: [id number]   : 1960   Date of Service: 2025 Referring: Dr. Romo     Reason for consultation: 64-year-old male with a PMH of 50-pack-year smoking history, CAD, & COPD with a newly diagnosed stage IIIb (T3 (greatest dimension 5.8 cm) N2 (ipsilateral paratracheal lymph node involvement) M0) adenocarcinoma of the RUL      Oncologic History:   2024: Patient treated for pneumonia for consolidation in RUL    2024, low-dose lung cancer screening:  - Masslike opacity in RUL.  4.9 x 4.2 cm.  - RLL pulmonary nodule (axial image 75).  0.7 x 0.6 cm.    2024, CT chest with contrast:  - Abnormal lesion in the RUL and RML.  6.9 x 6.3 x 4.1 cm.    2024, PFTs:      Latest Ref Rng & Units 2024     7:18 AM   PFT   FVC L 3.17    FEV1 L 2.09    FVC% % 93    FEV1% % 78        2024, endobronchial biopsy:  - RUL: No evidence of malignancy    2024, CT chest:  - Enlarging right suprahilar mass.  5.2 x 5.8 x 2.7 cm, contacting pleura.  Increased spiculated inferolateral extension.  - Increase size of enlarged paratracheal mediastinal nodes: 12 mm short axis, previously 9 mm (3/62).  - Multiple calcified mediastinal and right hilar lymph nodes.    2024, PET/CT:  - Right suprahilar calcified nodes.  SUV max 5.5.  - RUL anterior segment consolidation.  7.7 x 4.6 cm.  SUV max 8.4.  - 2 right upper paratracheal nodes.  1.2 cm, SUV max 4.1.  - Focal FDG uptake in the gastric antrum.  SUV max 5.3.      2024, right lung biopsy:  - Pathology: NSCLC, favoring adenocarcinoma.  Well-differentiated with lipidic (in situ) growth pattern developing within the scar.  TPS <1%.  (-) PD-L1  expression  - Flow cytometry: Polytypic B cells.  No immunophenotypic evidence of B-cell Hodgkin lymphoma.  - NGS lung panel (-) for any pathologically identified mutation effusions    1/6/2025, thoracic tumor conference:  - Consensus: Chemoradiotherapy and no surgical resection    1/7/2025, brain MRI: No evidence of intracranial metastatic disease    1/7/2025, cardiothoracic surgery consult:  - Patient is not a surgical candidate    1/7/2025, medical oncology consult:  - Recommend chemoradiotherapy with weekly carboplatin/paclitaxel followed by a year of durvalumab as specified by the Butts trial  - Notable social history includes that the patient lives in [place] which is [place] of the [facility] and that 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  The patient denies any issues with his breathing and denies hemoptysis, EDEN, productive cough, or pain with inspiration.  The patient lives in [facility], which is approximately 1.5 hours away from the [facility].  The patient and his family members had multiple questions about chemoradiotherapy.      Review of Systems   A 10-point review of systems was performed. Pertinent findings are noted in the HPI.  Physical Exam   ECOG Status: 1    Vitals: There were no vitals taken for this visit.  Gen: Alert, in NAD  Head: NC/AT  Eyes: PERRL, EOMI, sclera anicteric  Ears: No external auricular lesions  Nose/sinus: No rhinorrhea or epistaxis  Pulm: No wheezing, stridor or respiratory distress.  Lungs clear to auscultation in all lung fields.  CV: Extremities are warm and well-perfused, no cyanosis, no pedal edema  Musculoskeletal: Normal bulk and tone  Skin: Normal color and  turgor. No rashes or lesions.  Neuro: A/Ox3, CN II-XII intact, normal gait    Imaging/Path/Labs   Imaging: As above  Path: As above  Labs: As above  Assessment & Plan   64-year-old male with stage IIIb (T3 (greatest dimension 5.8 cm) N2 (ipsilateral paratracheal lymph node involvement) M0) adenocarcinoma of the RUL      The current standard of care for advanced stage NSCLC that is unresectable is concurrent radiotherapy consisting of 60 Gray/30 fractions with concurrent platinum/paclitaxel as supported by RTOG 0617 (PMID: 94185298) and RTOG 9410 (PMID: 44034175) in addition to adjuvant durvalumab for 1 year as supported by the Ellis trial (PMID: 50726310).  Per CALGB 8433 (PMID: 2587399), sequential chemo-RT had modestly better survival outcomes compared to thoracic RT alone but at a cost of increased treatment-related morbidity.  Later trials then showed concurrent chemotherapy to be superior over induction chemotherapy.    The risks and benefits of radiotherapy were discussed in detail with the patient.  The patient expressed understanding.      All questions answered to verbalized satisfaction.  Informed consent was obtained. The patient subsequently proceeded to CT Simulation.    Pertinent radiological and pathologic data was personally reviewed and discussed with the patient.     The patient understood the plan, was in agreement, and expressed gratitude for their care.    We instructed the patient to call with any questions or concerns in the interim.      Patient was seen and discussed with my attending physician, Dr. Lunsford.    Yann Sanchez MD, MS PGY4  Radiation Oncology  Department of Radiation Oncology  [facility]  Phone: [phone]      Attestation signed by Marvin Lunsford MD at 1/13/2025  3:14 PM:  I saw the patient alongside the resident physician and the note above reflects my exam and opinion. I reviewed pathology and imaging as above. A total of 60  "minutes was spent on this encounter, including face-to-face time with the patient.    Oncology Rooming Note    January 13, 2025 11:47 AM   Akira Plasencia is a 64 year old male who presents for:    Chief Complaint   Patient presents with     Oncology Clinic Visit     Consult     Initial Vitals: There were no vitals taken for this visit. Estimated body mass index is 26.78 kg/m  as calculated from the following:    Height as of 1/7/25: 1.65 m (5' 4.96\").    Weight as of 1/7/25: 72.9 kg (160 lb 11.5 oz). There is no height or weight on file to calculate BSA.  Data Unavailable Comment: Data Unavailable   No LMP for male patient.  Allergies reviewed: Yes  Medications reviewed: Yes    Medications: Medication refills not needed today.  Pharmacy name entered into [employer]: [id number] - [place], [place] - [address]    Frailty Screening:   Is the patient here for a new oncology consult visit in cancer care? 2. No      Clinical concerns: Consult       Adela Andrade RN    Considerations for radiation treatment   Pregnancy status: Male   Implanted Cardiac Devices: No   Any previous radiation therapy: No            Radiation Therapy Patient Education    Person involved with teaching: Patient and Sister    Patient educational needs for self management of treatment-related side effects assessment completed.  [facility] Patient Ed tab contains Patient Learning Assessment    Education Materials Given  Radiation Therapy and You    Educational Topics Discussed  Side effects expected, Pain management, Skin care, Nutrition and weight loss, and When to call MD/RN    Response To Teaching  Verbalizes understanding    GYN Only  Vaginal Dilator-given and educated: N/A    Referrals sent: None    Chemotherapy?  Yes: Notified medical oncology of start date 01/28/25 to 03/10/25        Again, thank you for allowing me to participate in the care of your patient.        Sincerely,        Marvin Lunsford MD    Electronically signed"

## 2025-01-13 NOTE — PROGRESS NOTES
"Oncology Rooming Note    January 13, 2025 11:47 AM   Akira Plasencia is a 64 year old male who presents for:    Chief Complaint   Patient presents with    Oncology Clinic Visit     Consult     Initial Vitals: There were no vitals taken for this visit. Estimated body mass index is 26.78 kg/m  as calculated from the following:    Height as of 1/7/25: 1.65 m (5' 4.96\").    Weight as of 1/7/25: 72.9 kg (160 lb 11.5 oz). There is no height or weight on file to calculate BSA.  Data Unavailable Comment: Data Unavailable   No LMP for male patient.  Allergies reviewed: Yes  Medications reviewed: Yes    Medications: Medication refills not needed today.  Pharmacy name entered into [employer]: [facility] [address] [place] [address] [address]    Frailty Screening:   Is the patient here for a new oncology consult visit in cancer care? 2. No      Clinical concerns: Consult       Adela Andrade RN    Considerations for radiation treatment   Pregnancy status: Male   Implanted Cardiac Devices: No   Any previous radiation therapy: No          "

## 2025-01-13 NOTE — PROGRESS NOTES
Radiation Therapy Patient Education    Person involved with teaching: Patient and Sister    Patient educational needs for self management of treatment-related side effects assessment completed.  [facility] Patient Ed tab contains Patient Learning Assessment    Education Materials Given  Radiation Therapy and You    Educational Topics Discussed  Side effects expected, Pain management, Skin care, Nutrition and weight loss, and When to call MD/RN    Response To Teaching  Verbalizes understanding    GYN Only  Vaginal Dilator-given and educated: N/A    Referrals sent: None    Chemotherapy?  Yes: Notified medical oncology of start date 01/28/25 to 03/10/25

## 2025-01-28 ENCOUNTER — HOSPITAL ENCOUNTER (OUTPATIENT)
Facility: CLINIC | Age: 65
Discharge: HOME | End: 2025-01-28
Attending: STUDENT IN AN ORGANIZED HEALTH CARE EDUCATION/TRAINING PROGRAM | Admitting: STUDENT IN AN ORGANIZED HEALTH CARE EDUCATION/TRAINING PROGRAM
Payer: COMMERCIAL

## 2025-01-28 ENCOUNTER — APPOINTMENT (OUTPATIENT)
Dept: MEDSURG UNIT | Facility: CLINIC | Age: 65
End: 2025-01-28
Attending: STUDENT IN AN ORGANIZED HEALTH CARE EDUCATION/TRAINING PROGRAM
Payer: MEDICARE

## 2025-01-28 ENCOUNTER — APPOINTMENT (OUTPATIENT)
Dept: RADIATION ONCOLOGY | Facility: CLINIC | Age: 65
End: 2025-01-28
Attending: RADIOLOGY
Payer: MEDICARE

## 2025-01-28 ENCOUNTER — APPOINTMENT (OUTPATIENT)
Dept: INTERVENTIONAL RADIOLOGY/VASCULAR | Facility: CLINIC | Age: 65
End: 2025-01-28
Attending: INTERNAL MEDICINE
Payer: MEDICARE

## 2025-01-28 VITALS
SYSTOLIC BLOOD PRESSURE: 127 MMHG | HEIGHT: 65 IN | WEIGHT: 157 LBS | TEMPERATURE: 98 F | HEART RATE: 71 BPM | OXYGEN SATURATION: 97 % | RESPIRATION RATE: 20 BRPM | BODY MASS INDEX: 26.16 KG/M2 | DIASTOLIC BLOOD PRESSURE: 84 MMHG

## 2025-01-28 DIAGNOSIS — C34.91 PRIMARY LUNG ADENOCARCINOMA, RIGHT (H): ICD-10-CM

## 2025-01-28 LAB
ANION GAP SERPL CALCULATED.3IONS-SCNC: 8 MMOL/L (ref 7–15)
ATRIAL RATE - MUSE: 73 BPM
BUN SERPL-MCNC: 10.4 MG/DL (ref 8–23)
CALCIUM SERPL-MCNC: 9.7 MG/DL (ref 8.8–10.4)
CHLORIDE SERPL-SCNC: 103 MMOL/L (ref 98–107)
CREAT SERPL-MCNC: 0.79 MG/DL (ref 0.67–1.17)
DIASTOLIC BLOOD PRESSURE - MUSE: NORMAL MMHG
EGFRCR SERPLBLD CKD-EPI 2021: >90 ML/MIN/1.73M2
ERYTHROCYTE [DISTWIDTH] IN BLOOD BY AUTOMATED COUNT: 14.1 % (ref 10–15)
GLUCOSE SERPL-MCNC: 92 MG/DL (ref 70–99)
HCO3 SERPL-SCNC: 27 MMOL/L (ref 22–29)
HCT VFR BLD AUTO: 40.9 % (ref 40–53)
HGB BLD-MCNC: 13.5 G/DL (ref 13.3–17.7)
INR PPP: 0.96 (ref 0.85–1.15)
INTERPRETATION ECG - MUSE: NORMAL
MCH RBC QN AUTO: 28.6 PG (ref 26.5–33)
MCHC RBC AUTO-ENTMCNC: 33 G/DL (ref 31.5–36.5)
MCV RBC AUTO: 87 FL (ref 78–100)
P AXIS - MUSE: 52 DEGREES
PLATELET # BLD AUTO: 339 10E3/UL (ref 150–450)
POTASSIUM SERPL-SCNC: 4.1 MMOL/L (ref 3.4–5.3)
PR INTERVAL - MUSE: 160 MS
QRS DURATION - MUSE: 94 MS
QT - MUSE: 388 MS
QTC - MUSE: 427 MS
R AXIS - MUSE: 60 DEGREES
RBC # BLD AUTO: 4.72 10E6/UL (ref 4.4–5.9)
SODIUM SERPL-SCNC: 138 MMOL/L (ref 135–145)
SYSTOLIC BLOOD PRESSURE - MUSE: NORMAL MMHG
T AXIS - MUSE: 50 DEGREES
VENTRICULAR RATE- MUSE: 73 BPM
WBC # BLD AUTO: 8.6 10E3/UL (ref 4–11)

## 2025-01-28 PROCEDURE — 36415 COLL VENOUS BLD VENIPUNCTURE: CPT | Performed by: NURSE PRACTITIONER

## 2025-01-28 PROCEDURE — 85610 PROTHROMBIN TIME: CPT | Performed by: NURSE PRACTITIONER

## 2025-01-28 PROCEDURE — 999N000054 HC STATISTIC EKG NON-CHARGEABLE

## 2025-01-28 PROCEDURE — C1769 GUIDE WIRE: HCPCS

## 2025-01-28 PROCEDURE — 272N000192 HC ACCESSORY CR2

## 2025-01-28 PROCEDURE — 77001 FLUOROGUIDE FOR VEIN DEVICE: CPT

## 2025-01-28 PROCEDURE — 80048 BASIC METABOLIC PNL TOTAL CA: CPT | Performed by: NURSE PRACTITIONER

## 2025-01-28 PROCEDURE — 36561 INSERT TUNNELED CV CATH: CPT | Mod: RT

## 2025-01-28 PROCEDURE — 999N000132 HC STATISTIC PP CARE STAGE 1

## 2025-01-28 PROCEDURE — C1788 PORT, INDWELLING, IMP: HCPCS

## 2025-01-28 PROCEDURE — 77001 FLUOROGUIDE FOR VEIN DEVICE: CPT | Mod: 26

## 2025-01-28 PROCEDURE — 85018 HEMOGLOBIN: CPT | Performed by: NURSE PRACTITIONER

## 2025-01-28 PROCEDURE — 272N000504 HC NEEDLE CR4

## 2025-01-28 PROCEDURE — 77386 HC IMRT TREATMENT DELIVERY, COMPLEX: CPT | Performed by: RADIOLOGY

## 2025-01-28 PROCEDURE — 999N000142 HC STATISTIC PROCEDURE PREP ONLY

## 2025-01-28 PROCEDURE — 250N000011 HC RX IP 250 OP 636

## 2025-01-28 PROCEDURE — 250N000011 HC RX IP 250 OP 636: Mod: JZ | Performed by: NURSE PRACTITIONER

## 2025-01-28 PROCEDURE — 76937 US GUIDE VASCULAR ACCESS: CPT | Mod: 26

## 2025-01-28 PROCEDURE — 250N000009 HC RX 250

## 2025-01-28 RX ORDER — LIDOCAINE HYDROCHLORIDE 10 MG/ML
1-30 INJECTION, SOLUTION EPIDURAL; INFILTRATION; INTRACAUDAL; PERINEURAL
Status: DISCONTINUED | OUTPATIENT
Start: 2025-01-28 | End: 2025-01-28

## 2025-01-28 RX ORDER — HEPARIN SODIUM (PORCINE) LOCK FLUSH IV SOLN 100 UNIT/ML 100 UNIT/ML
5-10 SOLUTION INTRAVENOUS
Status: DISCONTINUED | OUTPATIENT
Start: 2025-01-28 | End: 2025-01-28 | Stop reason: HOSPADM

## 2025-01-28 RX ORDER — PROCHLORPERAZINE MALEATE 10 MG
10 TABLET ORAL EVERY 6 HOURS PRN
Qty: 30 TABLET | Refills: 2 | Status: SHIPPED | OUTPATIENT
Start: 2025-01-28

## 2025-01-28 RX ORDER — HEPARIN SODIUM,PORCINE 10 UNIT/ML
5-10 VIAL (ML) INTRAVENOUS EVERY 24 HOURS
Status: DISCONTINUED | OUTPATIENT
Start: 2025-01-28 | End: 2025-01-28 | Stop reason: HOSPADM

## 2025-01-28 RX ORDER — HEPARIN SODIUM,PORCINE 10 UNIT/ML
5-10 VIAL (ML) INTRAVENOUS
Status: DISCONTINUED | OUTPATIENT
Start: 2025-01-28 | End: 2025-01-28 | Stop reason: HOSPADM

## 2025-01-28 RX ORDER — LIDOCAINE 40 MG/G
CREAM TOPICAL
Status: DISCONTINUED | OUTPATIENT
Start: 2025-01-28 | End: 2025-01-28

## 2025-01-28 RX ORDER — CEFAZOLIN SODIUM 2 G/100ML
2 INJECTION, SOLUTION INTRAVENOUS
Status: COMPLETED | OUTPATIENT
Start: 2025-01-28 | End: 2025-01-28

## 2025-01-28 RX ADMIN — CEFAZOLIN SODIUM 2 G: 2 INJECTION, SOLUTION INTRAVENOUS at 13:07

## 2025-01-28 RX ADMIN — LIDOCAINE HYDROCHLORIDE 18 ML: 10 INJECTION, SOLUTION EPIDURAL; INFILTRATION; INTRACAUDAL; PERINEURAL at 14:26

## 2025-01-28 RX ADMIN — HEPARIN 5 ML: 100 SYRINGE at 14:58

## 2025-01-28 ASSESSMENT — ACTIVITIES OF DAILY LIVING (ADL)
ADLS_ACUITY_SCORE: 41

## 2025-01-28 NOTE — IR NOTE
Patient Name: Akira Plasencia  Medical Record Number: [id number]  Today's Date: 1/28/2025    Procedure: Imaging Guided Port Placement   Proceduralist: Margarita STANLEY  Pathology present: N/A    Procedure Start: 1421  Procedure end: 1509  Sedation medications administered: N/A, local only     Report given to:  RN  : N/A    Other Notes: Pt arrived to IR room 2 from . Consent reviewed. Pt denies any questions or concerns regarding procedure. Pt positioned supine and monitored per protocol. Pt tolerated procedure without any noted complications. Pt transferred back to .

## 2025-01-28 NOTE — PROGRESS NOTES
Pt arrived to 2A for port placement. VSS, denies pain. PIV infusing. Labs sent. Consent signed. Pt drove self today, will not receive sedation.

## 2025-01-28 NOTE — DISCHARGE INSTRUCTIONS
[facility]        Interventional Radiology  Discharge Instructions  Following Port Placement  You had a port placed. A port is a small medical device that is placed under the skin and is connected to a vein with a catheter (thin, flexible tube). Ports can be used to administer IV medications (including chemotherapy), fluids or blood products or for blood lab draws. Please follow the below instructions after your procedure:    Your Port has been closed with  Absorbable suture  If after 10 days there are visible suture they may be trimmed by your primary doctor  Derma Penaloza (Skin Glue)    If there is any oozing or bleeding from the site, apply direct pressure for 5-10 minutes with a gauze pad.  If bleeding continues after 10 minutes call your primary doctor.  If bleeding cannot be controlled with direct pressure, call 911.    If you experience the following seek medical evaluation:    Uncontrolled bleeding from port site  Fever (greater than 100 )  Purulent (yellow/green/foul smelling) drainage from port insertion site  Increasing pain at port site  Increasing redness at port site  Increasing swelling    Care Instructions   You may shower beginning tomorrow (post procedure day #1). Do not scrub site until well healed, pat dry gently with a towel.  You likely have skin adhesive over your port site. Skin adhesive works like a bandage to keep the site covered and protected. Do not use antibiotic ointment or creams/lotions over adhesive as it can break it down. The skin adhesive will peel off on its own (typically in 5-14 days).  Avoid submerging the port site under water (ex: tub baths, Jacuzzis, lakes, hot tubs and pools) for 10 days or until your site is well healed.  You may have some discomfort, minimal swelling, redness and/or bruising at your port site/procedure site. You may take over the counter pain medication for discomfort (follow the package directions) or apply an ice pack wrapped in a  towel over the site (rotating 20 minutes with ice pack on and 20 minutes with ice pack off) for comfort as needed. It can take several days for these to resolve.  Avoid heavy lifting (greater than 10 pounds) and strenuous activities for 2-3 days following your procedure.  If you experience significant bleeding at site, apply pressure with hands above the clavicle bone, sit upright and seek immediate medical assistance.  Ports need to be flushed approximately every 4 weeks, if not being used more frequently. Follow up with the provider who ordered your port placement for further instructions for this.    [facility] INTERVENTIONAL RADIOLOGY DEPARTMENT  Procedure Physician: Lucila Avila PA-C           Date of procedure: January 28, 2025  Telephone Numbers:  [phone]      Monday-Friday 7:30 am to 4:00 pm  [phone]   After 4:00 pm Monday-Friday, Weekends & Holidays.   Ask for the Interventional Radiologist on call.  Someone is on call 24 hrs/day  [facility] toll free number: [phone]     Monday-Friday 8:00 am to 4:30 pm  [facility] Emergency Dept: [phone]        IF YOU ARE EXPERIENCING A MEDICAL EMERGENCY PLEASE CALL [phone]

## 2025-01-28 NOTE — DISCHARGE SUMMARY
Pt discharged to home. VSS on RA. Right neck and right chest access site C/D/I, negative for a hematoma. Up ambulating and voiding w/o difficulty. PIV access removed. Tolerating PO intake. Discharge instructions reviewed and all questions answered. Pt ambulated to the front entrance where her parked in the ramp (he did NOT receive sedation).

## 2025-01-28 NOTE — PROGRESS NOTES
Pt s/p port placement. Vitally stable. Denies pain. Right chest port site covered with dermabond and CDI. Food provided. Bedrest orders for 1 hour.

## 2025-01-28 NOTE — PROCEDURES
[facility]    Procedure: IR Procedure Note    Date/Time: 1/28/2025 3:13 PM    Performed by: Elisabet Avila PA-C  Authorized by: Elisabet Avila PA-C  IR Fellow Physician:    Pre Procedure Diagnosis: NSCLC  Post Procedure Diagnosis: same    UNIVERSAL PROTOCOL   Site Marked: NA  Prior Images Obtained and Reviewed:  Yes  Required items: Required blood products, implants, devices and special equipment available    Patient identity confirmed:  Arm band, provided demographic data, hospital-assigned identification number and verbally with patient  Patient was reevaluated immediately before administering moderate or deep sedation or anesthesia  Confirmation Checklist:  Correct equipment/implants were available, procedure was appropriate and matched the consent or emergent situation, relevant allergies and patient's identity using two indicators  Time out: Immediately prior to the procedure a time out was called    Universal Protocol: the Joint Commission Universal Protocol was followed    Preparation: Patient was prepped and draped in usual sterile fashion       ANESTHESIA    Anesthesia:  Local infiltration  Local Anesthetic:  Lidocaine 1% without epinephrine      SEDATION    Patient Sedated: No    See dictated procedure note for full details.  Findings: Patient was not NPO. No sedation.    Specimens: none    Procedural Complications: None    Condition: Stable    Plan: Follow-up per primary team.      PROCEDURE  Describe Procedure: Image-guided placement of right single lumen, 8 Fr, 25 cm, chest port via right internal jugular. Aspirates and flushes well. Heparin-locked and ready for immediate use.  Patient Tolerance:  Patient tolerated the procedure well with no immediate complications  Length of time physician/provider present for 1:1 monitoring during sedation:  0 min

## 2025-01-29 ENCOUNTER — APPOINTMENT (OUTPATIENT)
Dept: LAB | Facility: CLINIC | Age: 65
End: 2025-01-29
Attending: NURSE PRACTITIONER
Payer: MEDICARE

## 2025-01-29 ENCOUNTER — APPOINTMENT (OUTPATIENT)
Dept: RADIATION ONCOLOGY | Facility: CLINIC | Age: 65
End: 2025-01-29
Attending: RADIOLOGY
Payer: MEDICARE

## 2025-01-29 ENCOUNTER — INFUSION THERAPY VISIT (OUTPATIENT)
Dept: ONCOLOGY | Facility: CLINIC | Age: 65
End: 2025-01-29
Attending: NURSE PRACTITIONER
Payer: MEDICARE

## 2025-01-29 VITALS
BODY MASS INDEX: 26.05 KG/M2 | DIASTOLIC BLOOD PRESSURE: 77 MMHG | SYSTOLIC BLOOD PRESSURE: 124 MMHG | WEIGHT: 156.55 LBS | OXYGEN SATURATION: 98 % | RESPIRATION RATE: 18 BRPM | HEART RATE: 78 BPM | TEMPERATURE: 97.8 F

## 2025-01-29 DIAGNOSIS — L03.90 CELLULITIS, UNSPECIFIED CELLULITIS SITE: ICD-10-CM

## 2025-01-29 DIAGNOSIS — C34.91 PRIMARY LUNG ADENOCARCINOMA, RIGHT (H): Primary | ICD-10-CM

## 2025-01-29 DIAGNOSIS — J44.9 CHRONIC OBSTRUCTIVE PULMONARY DISEASE, UNSPECIFIED COPD TYPE (H): ICD-10-CM

## 2025-01-29 LAB
BASOPHILS # BLD AUTO: 0 10E3/UL (ref 0–0.2)
BASOPHILS NFR BLD AUTO: 0 %
CREAT SERPL-MCNC: 0.78 MG/DL (ref 0.67–1.17)
EGFRCR SERPLBLD CKD-EPI 2021: >90 ML/MIN/1.73M2
EOSINOPHIL # BLD AUTO: 0.1 10E3/UL (ref 0–0.7)
EOSINOPHIL NFR BLD AUTO: 1 %
ERYTHROCYTE [DISTWIDTH] IN BLOOD BY AUTOMATED COUNT: 14 % (ref 10–15)
HCT VFR BLD AUTO: 43.6 % (ref 40–53)
HGB BLD-MCNC: 14 G/DL (ref 13.3–17.7)
IMM GRANULOCYTES # BLD: 0.1 10E3/UL
IMM GRANULOCYTES NFR BLD: 1 %
LYMPHOCYTES # BLD AUTO: 1.2 10E3/UL (ref 0.8–5.3)
LYMPHOCYTES NFR BLD AUTO: 16 %
MCH RBC QN AUTO: 28.3 PG (ref 26.5–33)
MCHC RBC AUTO-ENTMCNC: 32.1 G/DL (ref 31.5–36.5)
MCV RBC AUTO: 88 FL (ref 78–100)
MONOCYTES # BLD AUTO: 0.9 10E3/UL (ref 0–1.3)
MONOCYTES NFR BLD AUTO: 12 %
NEUTROPHILS # BLD AUTO: 5.5 10E3/UL (ref 1.6–8.3)
NEUTROPHILS NFR BLD AUTO: 71 %
NRBC # BLD AUTO: 0 10E3/UL
NRBC BLD AUTO-RTO: 0 /100
PLATELET # BLD AUTO: 331 10E3/UL (ref 150–450)
RBC # BLD AUTO: 4.95 10E6/UL (ref 4.4–5.9)
WBC # BLD AUTO: 7.7 10E3/UL (ref 4–11)

## 2025-01-29 PROCEDURE — 36415 COLL VENOUS BLD VENIPUNCTURE: CPT | Performed by: NURSE PRACTITIONER

## 2025-01-29 PROCEDURE — 85049 AUTOMATED PLATELET COUNT: CPT | Performed by: NURSE PRACTITIONER

## 2025-01-29 PROCEDURE — 96417 CHEMO IV INFUS EACH ADDL SEQ: CPT

## 2025-01-29 PROCEDURE — 250N000011 HC RX IP 250 OP 636: Performed by: INTERNAL MEDICINE

## 2025-01-29 PROCEDURE — 258N000003 HC RX IP 258 OP 636: Performed by: INTERNAL MEDICINE

## 2025-01-29 PROCEDURE — 36591 DRAW BLOOD OFF VENOUS DEVICE: CPT

## 2025-01-29 PROCEDURE — 85004 AUTOMATED DIFF WBC COUNT: CPT | Performed by: NURSE PRACTITIONER

## 2025-01-29 PROCEDURE — 250N000013 HC RX MED GY IP 250 OP 250 PS 637: Performed by: INTERNAL MEDICINE

## 2025-01-29 PROCEDURE — 87040 BLOOD CULTURE FOR BACTERIA: CPT

## 2025-01-29 PROCEDURE — 99215 OFFICE O/P EST HI 40 MIN: CPT | Performed by: NURSE PRACTITIONER

## 2025-01-29 PROCEDURE — 77386 HC IMRT TREATMENT DELIVERY, COMPLEX: CPT | Performed by: STUDENT IN AN ORGANIZED HEALTH CARE EDUCATION/TRAINING PROGRAM

## 2025-01-29 PROCEDURE — 96375 TX/PRO/DX INJ NEW DRUG ADDON: CPT

## 2025-01-29 PROCEDURE — G2211 COMPLEX E/M VISIT ADD ON: HCPCS | Performed by: NURSE PRACTITIONER

## 2025-01-29 PROCEDURE — 82565 ASSAY OF CREATININE: CPT | Performed by: NURSE PRACTITIONER

## 2025-01-29 PROCEDURE — G0463 HOSPITAL OUTPT CLINIC VISIT: HCPCS | Mod: 25 | Performed by: NURSE PRACTITIONER

## 2025-01-29 PROCEDURE — 96413 CHEMO IV INFUSION 1 HR: CPT

## 2025-01-29 RX ORDER — DIPHENHYDRAMINE HCL 50 MG
50 CAPSULE ORAL ONCE
Start: 2025-02-19

## 2025-01-29 RX ORDER — HEPARIN SODIUM (PORCINE) LOCK FLUSH IV SOLN 100 UNIT/ML 100 UNIT/ML
5 SOLUTION INTRAVENOUS
OUTPATIENT
Start: 2025-03-12

## 2025-01-29 RX ORDER — DIPHENHYDRAMINE HYDROCHLORIDE 50 MG/ML
50 INJECTION INTRAMUSCULAR; INTRAVENOUS EVERY 6 HOURS PRN
Start: 2025-03-12

## 2025-01-29 RX ORDER — EPINEPHRINE 1 MG/ML
0.3 INJECTION, SOLUTION, CONCENTRATE INTRAVENOUS EVERY 5 MIN PRN
OUTPATIENT
Start: 2025-02-05

## 2025-01-29 RX ORDER — DIPHENHYDRAMINE HYDROCHLORIDE 50 MG/ML
25 INJECTION INTRAMUSCULAR; INTRAVENOUS
Start: 2025-02-19

## 2025-01-29 RX ORDER — LORAZEPAM 2 MG/ML
0.5 INJECTION INTRAMUSCULAR EVERY 4 HOURS PRN
OUTPATIENT
Start: 2025-02-19

## 2025-01-29 RX ORDER — ALBUTEROL SULFATE 0.83 MG/ML
2.5 SOLUTION RESPIRATORY (INHALATION)
Status: CANCELLED | OUTPATIENT
Start: 2025-01-29

## 2025-01-29 RX ORDER — DEXAMETHASONE SODIUM PHOSPHATE 4 MG/ML
20 INJECTION, SOLUTION INTRA-ARTICULAR; INTRALESIONAL; INTRAMUSCULAR; INTRAVENOUS; SOFT TISSUE ONCE
Status: COMPLETED | OUTPATIENT
Start: 2025-01-29 | End: 2025-01-29

## 2025-01-29 RX ORDER — DEXAMETHASONE 0.5 MG/1
20 TABLET ORAL ONCE
Start: 2025-02-26 | End: 2025-02-26

## 2025-01-29 RX ORDER — ONDANSETRON 2 MG/ML
8 INJECTION INTRAMUSCULAR; INTRAVENOUS ONCE
Start: 2025-03-05 | End: 2025-03-05

## 2025-01-29 RX ORDER — DEXAMETHASONE 0.5 MG/1
20 TABLET ORAL ONCE
Start: 2025-02-19 | End: 2025-02-19

## 2025-01-29 RX ORDER — HEPARIN SODIUM,PORCINE 10 UNIT/ML
5-20 VIAL (ML) INTRAVENOUS DAILY PRN
OUTPATIENT
Start: 2025-02-26

## 2025-01-29 RX ORDER — HEPARIN SODIUM,PORCINE 10 UNIT/ML
5-20 VIAL (ML) INTRAVENOUS DAILY PRN
OUTPATIENT
Start: 2025-03-12

## 2025-01-29 RX ORDER — ONDANSETRON 2 MG/ML
8 INJECTION INTRAMUSCULAR; INTRAVENOUS ONCE
Start: 2025-02-19 | End: 2025-02-19

## 2025-01-29 RX ORDER — HEPARIN SODIUM,PORCINE 10 UNIT/ML
5-20 VIAL (ML) INTRAVENOUS DAILY PRN
OUTPATIENT
Start: 2025-03-05

## 2025-01-29 RX ORDER — EPINEPHRINE 1 MG/ML
0.3 INJECTION, SOLUTION, CONCENTRATE INTRAVENOUS EVERY 5 MIN PRN
OUTPATIENT
Start: 2025-03-05

## 2025-01-29 RX ORDER — DEXAMETHASONE 0.5 MG/1
20 TABLET ORAL ONCE
Status: CANCELLED
Start: 2025-01-29 | End: 2025-01-29

## 2025-01-29 RX ORDER — EPINEPHRINE 1 MG/ML
0.3 INJECTION, SOLUTION, CONCENTRATE INTRAVENOUS EVERY 5 MIN PRN
OUTPATIENT
Start: 2025-02-26

## 2025-01-29 RX ORDER — MEPERIDINE HYDROCHLORIDE 25 MG/ML
25 INJECTION INTRAMUSCULAR; INTRAVENOUS; SUBCUTANEOUS
Status: CANCELLED | OUTPATIENT
Start: 2025-01-29

## 2025-01-29 RX ORDER — EPINEPHRINE 1 MG/ML
0.3 INJECTION, SOLUTION, CONCENTRATE INTRAVENOUS EVERY 5 MIN PRN
OUTPATIENT
Start: 2025-02-19

## 2025-01-29 RX ORDER — ALBUTEROL SULFATE 0.83 MG/ML
2.5 SOLUTION RESPIRATORY (INHALATION)
OUTPATIENT
Start: 2025-02-19

## 2025-01-29 RX ORDER — HEPARIN SODIUM,PORCINE 10 UNIT/ML
5-20 VIAL (ML) INTRAVENOUS DAILY PRN
Status: DISCONTINUED | OUTPATIENT
Start: 2025-01-29 | End: 2025-01-29 | Stop reason: HOSPADM

## 2025-01-29 RX ORDER — MEPERIDINE HYDROCHLORIDE 25 MG/ML
25 INJECTION INTRAMUSCULAR; INTRAVENOUS; SUBCUTANEOUS
OUTPATIENT
Start: 2025-02-12

## 2025-01-29 RX ORDER — HEPARIN SODIUM (PORCINE) LOCK FLUSH IV SOLN 100 UNIT/ML 100 UNIT/ML
5 SOLUTION INTRAVENOUS
OUTPATIENT
Start: 2025-02-19

## 2025-01-29 RX ORDER — ALBUTEROL SULFATE 0.83 MG/ML
2.5 SOLUTION RESPIRATORY (INHALATION)
OUTPATIENT
Start: 2025-02-26

## 2025-01-29 RX ORDER — EPINEPHRINE 1 MG/ML
0.3 INJECTION, SOLUTION, CONCENTRATE INTRAVENOUS EVERY 5 MIN PRN
OUTPATIENT
Start: 2025-03-12

## 2025-01-29 RX ORDER — DIPHENHYDRAMINE HYDROCHLORIDE 50 MG/ML
50 INJECTION INTRAMUSCULAR; INTRAVENOUS EVERY 6 HOURS PRN
Start: 2025-02-19

## 2025-01-29 RX ORDER — DIPHENHYDRAMINE HYDROCHLORIDE 50 MG/ML
25 INJECTION INTRAMUSCULAR; INTRAVENOUS
Start: 2025-02-12

## 2025-01-29 RX ORDER — ALBUTEROL SULFATE 0.83 MG/ML
2.5 SOLUTION RESPIRATORY (INHALATION)
OUTPATIENT
Start: 2025-03-12

## 2025-01-29 RX ORDER — ALBUTEROL SULFATE 90 UG/1
1-2 INHALANT RESPIRATORY (INHALATION)
Start: 2025-02-12

## 2025-01-29 RX ORDER — DIPHENHYDRAMINE HYDROCHLORIDE 50 MG/ML
25 INJECTION INTRAMUSCULAR; INTRAVENOUS
Start: 2025-03-05

## 2025-01-29 RX ORDER — DIPHENHYDRAMINE HYDROCHLORIDE 50 MG/ML
50 INJECTION INTRAMUSCULAR; INTRAVENOUS EVERY 6 HOURS PRN
Start: 2025-02-26

## 2025-01-29 RX ORDER — DIPHENHYDRAMINE HCL 50 MG
50 CAPSULE ORAL ONCE
Start: 2025-02-26

## 2025-01-29 RX ORDER — DIPHENHYDRAMINE HYDROCHLORIDE 50 MG/ML
25 INJECTION INTRAMUSCULAR; INTRAVENOUS
Start: 2025-03-12

## 2025-01-29 RX ORDER — DIPHENHYDRAMINE HYDROCHLORIDE 50 MG/ML
50 INJECTION INTRAMUSCULAR; INTRAVENOUS EVERY 6 HOURS PRN
Start: 2025-03-05

## 2025-01-29 RX ORDER — DIPHENHYDRAMINE HYDROCHLORIDE 50 MG/ML
50 INJECTION INTRAMUSCULAR; INTRAVENOUS
Start: 2025-02-12

## 2025-01-29 RX ORDER — DIPHENHYDRAMINE HYDROCHLORIDE 50 MG/ML
50 INJECTION INTRAMUSCULAR; INTRAVENOUS EVERY 6 HOURS PRN
Start: 2025-02-05

## 2025-01-29 RX ORDER — DIPHENHYDRAMINE HCL 50 MG
50 CAPSULE ORAL ONCE
Start: 2025-02-05

## 2025-01-29 RX ORDER — DIPHENHYDRAMINE HYDROCHLORIDE 50 MG/ML
25 INJECTION INTRAMUSCULAR; INTRAVENOUS
Start: 2025-02-26

## 2025-01-29 RX ORDER — MEPERIDINE HYDROCHLORIDE 25 MG/ML
25 INJECTION INTRAMUSCULAR; INTRAVENOUS; SUBCUTANEOUS
OUTPATIENT
Start: 2025-02-26

## 2025-01-29 RX ORDER — ALBUTEROL SULFATE 0.83 MG/ML
2.5 SOLUTION RESPIRATORY (INHALATION)
OUTPATIENT
Start: 2025-02-12

## 2025-01-29 RX ORDER — HEPARIN SODIUM (PORCINE) LOCK FLUSH IV SOLN 100 UNIT/ML 100 UNIT/ML
5 SOLUTION INTRAVENOUS
OUTPATIENT
Start: 2025-02-12

## 2025-01-29 RX ORDER — LORAZEPAM 2 MG/ML
0.5 INJECTION INTRAMUSCULAR EVERY 4 HOURS PRN
OUTPATIENT
Start: 2025-03-12

## 2025-01-29 RX ORDER — ALBUTEROL SULFATE 90 UG/1
1-2 INHALANT RESPIRATORY (INHALATION)
Start: 2025-02-19

## 2025-01-29 RX ORDER — MEPERIDINE HYDROCHLORIDE 25 MG/ML
25 INJECTION INTRAMUSCULAR; INTRAVENOUS; SUBCUTANEOUS
OUTPATIENT
Start: 2025-03-12

## 2025-01-29 RX ORDER — ONDANSETRON 2 MG/ML
8 INJECTION INTRAMUSCULAR; INTRAVENOUS ONCE
Start: 2025-02-05 | End: 2025-02-05

## 2025-01-29 RX ORDER — EPINEPHRINE 1 MG/ML
0.3 INJECTION, SOLUTION, CONCENTRATE INTRAVENOUS EVERY 5 MIN PRN
Status: CANCELLED | OUTPATIENT
Start: 2025-01-29

## 2025-01-29 RX ORDER — ONDANSETRON 2 MG/ML
8 INJECTION INTRAMUSCULAR; INTRAVENOUS ONCE
Start: 2025-03-12 | End: 2025-03-12

## 2025-01-29 RX ORDER — DIPHENHYDRAMINE HYDROCHLORIDE 50 MG/ML
50 INJECTION INTRAMUSCULAR; INTRAVENOUS
Start: 2025-02-05

## 2025-01-29 RX ORDER — DEXAMETHASONE 0.5 MG/1
20 TABLET ORAL ONCE
Start: 2025-03-05 | End: 2025-03-05

## 2025-01-29 RX ORDER — DOXYCYCLINE 100 MG/1
100 CAPSULE ORAL 2 TIMES DAILY
Qty: 14 CAPSULE | Refills: 0 | Status: SHIPPED | OUTPATIENT
Start: 2025-01-29

## 2025-01-29 RX ORDER — DIPHENHYDRAMINE HCL 50 MG
50 CAPSULE ORAL ONCE
Start: 2025-03-05

## 2025-01-29 RX ORDER — MEPERIDINE HYDROCHLORIDE 25 MG/ML
25 INJECTION INTRAMUSCULAR; INTRAVENOUS; SUBCUTANEOUS
OUTPATIENT
Start: 2025-02-05

## 2025-01-29 RX ORDER — DIPHENHYDRAMINE HYDROCHLORIDE 50 MG/ML
50 INJECTION INTRAMUSCULAR; INTRAVENOUS EVERY 6 HOURS PRN
Status: DISCONTINUED | OUTPATIENT
Start: 2025-01-29 | End: 2025-01-29 | Stop reason: HOSPADM

## 2025-01-29 RX ORDER — HEPARIN SODIUM,PORCINE 10 UNIT/ML
5-20 VIAL (ML) INTRAVENOUS DAILY PRN
OUTPATIENT
Start: 2025-02-19

## 2025-01-29 RX ORDER — ALBUTEROL SULFATE 90 UG/1
1-2 INHALANT RESPIRATORY (INHALATION)
Start: 2025-02-05

## 2025-01-29 RX ORDER — LORAZEPAM 2 MG/ML
0.5 INJECTION INTRAMUSCULAR EVERY 4 HOURS PRN
OUTPATIENT
Start: 2025-03-05

## 2025-01-29 RX ORDER — EPINEPHRINE 1 MG/ML
0.3 INJECTION, SOLUTION, CONCENTRATE INTRAVENOUS EVERY 5 MIN PRN
OUTPATIENT
Start: 2025-02-12

## 2025-01-29 RX ORDER — DIPHENHYDRAMINE HYDROCHLORIDE 50 MG/ML
50 INJECTION INTRAMUSCULAR; INTRAVENOUS
Start: 2025-03-05

## 2025-01-29 RX ORDER — DIPHENHYDRAMINE HCL 25 MG
50 CAPSULE ORAL ONCE
Status: COMPLETED | OUTPATIENT
Start: 2025-01-29 | End: 2025-01-29

## 2025-01-29 RX ORDER — DEXAMETHASONE 0.5 MG/1
20 TABLET ORAL ONCE
Start: 2025-02-12 | End: 2025-02-12

## 2025-01-29 RX ORDER — MEPERIDINE HYDROCHLORIDE 25 MG/ML
25 INJECTION INTRAMUSCULAR; INTRAVENOUS; SUBCUTANEOUS
OUTPATIENT
Start: 2025-02-19

## 2025-01-29 RX ORDER — DIPHENHYDRAMINE HCL 50 MG
50 CAPSULE ORAL ONCE
Start: 2025-02-12

## 2025-01-29 RX ORDER — ALBUTEROL SULFATE 90 UG/1
1-2 INHALANT RESPIRATORY (INHALATION)
Start: 2025-02-26

## 2025-01-29 RX ORDER — LORAZEPAM 2 MG/ML
0.5 INJECTION INTRAMUSCULAR EVERY 4 HOURS PRN
Status: CANCELLED | OUTPATIENT
Start: 2025-01-29

## 2025-01-29 RX ORDER — DIPHENHYDRAMINE HYDROCHLORIDE 50 MG/ML
50 INJECTION INTRAMUSCULAR; INTRAVENOUS
Start: 2025-03-12

## 2025-01-29 RX ORDER — HEPARIN SODIUM (PORCINE) LOCK FLUSH IV SOLN 100 UNIT/ML 100 UNIT/ML
5 SOLUTION INTRAVENOUS
OUTPATIENT
Start: 2025-02-05

## 2025-01-29 RX ORDER — DIPHENHYDRAMINE HYDROCHLORIDE 50 MG/ML
25 INJECTION INTRAMUSCULAR; INTRAVENOUS
Status: CANCELLED
Start: 2025-01-29

## 2025-01-29 RX ORDER — HEPARIN SODIUM (PORCINE) LOCK FLUSH IV SOLN 100 UNIT/ML 100 UNIT/ML
5 SOLUTION INTRAVENOUS
OUTPATIENT
Start: 2025-02-26

## 2025-01-29 RX ORDER — DIPHENHYDRAMINE HYDROCHLORIDE 50 MG/ML
50 INJECTION INTRAMUSCULAR; INTRAVENOUS
Start: 2025-02-26

## 2025-01-29 RX ORDER — HEPARIN SODIUM,PORCINE 10 UNIT/ML
5-20 VIAL (ML) INTRAVENOUS DAILY PRN
OUTPATIENT
Start: 2025-02-12

## 2025-01-29 RX ORDER — DEXAMETHASONE 0.5 MG/1
20 TABLET ORAL ONCE
Start: 2025-03-12 | End: 2025-03-12

## 2025-01-29 RX ORDER — DIPHENHYDRAMINE HYDROCHLORIDE 50 MG/ML
50 INJECTION INTRAMUSCULAR; INTRAVENOUS
Status: CANCELLED
Start: 2025-01-29

## 2025-01-29 RX ORDER — ALBUTEROL SULFATE 0.83 MG/ML
2.5 SOLUTION RESPIRATORY (INHALATION)
OUTPATIENT
Start: 2025-02-05

## 2025-01-29 RX ORDER — MEPERIDINE HYDROCHLORIDE 25 MG/ML
25 INJECTION INTRAMUSCULAR; INTRAVENOUS; SUBCUTANEOUS
OUTPATIENT
Start: 2025-03-05

## 2025-01-29 RX ORDER — ALBUTEROL SULFATE 0.83 MG/ML
2.5 SOLUTION RESPIRATORY (INHALATION)
OUTPATIENT
Start: 2025-03-05

## 2025-01-29 RX ORDER — DIPHENHYDRAMINE HYDROCHLORIDE 50 MG/ML
50 INJECTION INTRAMUSCULAR; INTRAVENOUS EVERY 6 HOURS PRN
Start: 2025-02-12

## 2025-01-29 RX ORDER — DIPHENHYDRAMINE HYDROCHLORIDE 50 MG/ML
50 INJECTION INTRAMUSCULAR; INTRAVENOUS
Start: 2025-02-19

## 2025-01-29 RX ORDER — ONDANSETRON 2 MG/ML
8 INJECTION INTRAMUSCULAR; INTRAVENOUS ONCE
Start: 2025-02-12 | End: 2025-02-12

## 2025-01-29 RX ORDER — HEPARIN SODIUM,PORCINE 10 UNIT/ML
5-20 VIAL (ML) INTRAVENOUS DAILY PRN
OUTPATIENT
Start: 2025-02-05

## 2025-01-29 RX ORDER — ONDANSETRON 2 MG/ML
8 INJECTION INTRAMUSCULAR; INTRAVENOUS ONCE
Start: 2025-02-26 | End: 2025-02-26

## 2025-01-29 RX ORDER — ALBUTEROL SULFATE 90 UG/1
1-2 INHALANT RESPIRATORY (INHALATION)
Status: CANCELLED
Start: 2025-01-29

## 2025-01-29 RX ORDER — DIPHENHYDRAMINE HYDROCHLORIDE 50 MG/ML
25 INJECTION INTRAMUSCULAR; INTRAVENOUS
Start: 2025-02-05

## 2025-01-29 RX ORDER — DEXAMETHASONE 0.5 MG/1
20 TABLET ORAL ONCE
Start: 2025-02-05 | End: 2025-02-05

## 2025-01-29 RX ORDER — DIPHENHYDRAMINE HCL 50 MG
50 CAPSULE ORAL ONCE
Start: 2025-03-12

## 2025-01-29 RX ORDER — LORAZEPAM 2 MG/ML
0.5 INJECTION INTRAMUSCULAR EVERY 4 HOURS PRN
OUTPATIENT
Start: 2025-02-26

## 2025-01-29 RX ORDER — ONDANSETRON 2 MG/ML
8 INJECTION INTRAMUSCULAR; INTRAVENOUS ONCE
Status: COMPLETED | OUTPATIENT
Start: 2025-01-29 | End: 2025-01-29

## 2025-01-29 RX ORDER — LORAZEPAM 2 MG/ML
0.5 INJECTION INTRAMUSCULAR EVERY 4 HOURS PRN
OUTPATIENT
Start: 2025-02-05

## 2025-01-29 RX ORDER — ALBUTEROL SULFATE 90 UG/1
1-2 INHALANT RESPIRATORY (INHALATION)
Start: 2025-03-05

## 2025-01-29 RX ORDER — LORAZEPAM 2 MG/ML
0.5 INJECTION INTRAMUSCULAR EVERY 4 HOURS PRN
OUTPATIENT
Start: 2025-02-12

## 2025-01-29 RX ORDER — HEPARIN SODIUM (PORCINE) LOCK FLUSH IV SOLN 100 UNIT/ML 100 UNIT/ML
5 SOLUTION INTRAVENOUS
Status: DISCONTINUED | OUTPATIENT
Start: 2025-01-29 | End: 2025-01-29 | Stop reason: HOSPADM

## 2025-01-29 RX ORDER — ALBUTEROL SULFATE 90 UG/1
1-2 INHALANT RESPIRATORY (INHALATION)
Start: 2025-03-12

## 2025-01-29 RX ORDER — HEPARIN SODIUM (PORCINE) LOCK FLUSH IV SOLN 100 UNIT/ML 100 UNIT/ML
5 SOLUTION INTRAVENOUS
OUTPATIENT
Start: 2025-03-05

## 2025-01-29 RX ADMIN — PACLITAXEL 82 MG: 6 INJECTION, SOLUTION INTRAVENOUS at 14:30

## 2025-01-29 RX ADMIN — HEPARIN 5 ML: 100 SYRINGE at 14:41

## 2025-01-29 RX ADMIN — ONDANSETRON 8 MG: 2 INJECTION INTRAMUSCULAR; INTRAVENOUS at 13:49

## 2025-01-29 RX ADMIN — FAMOTIDINE 20 MG: 10 INJECTION INTRAVENOUS at 13:52

## 2025-01-29 RX ADMIN — SODIUM CHLORIDE 100 ML: 9 INJECTION, SOLUTION INTRAVENOUS at 13:48

## 2025-01-29 RX ADMIN — DIPHENHYDRAMINE HYDROCHLORIDE 50 MG: 25 CAPSULE ORAL at 13:34

## 2025-01-29 RX ADMIN — CARBOPLATIN 250 MG: 10 INJECTION, SOLUTION INTRAVENOUS at 15:36

## 2025-01-29 RX ADMIN — DEXAMETHASONE SODIUM PHOSPHATE 20 MG: 4 INJECTION, SOLUTION INTRA-ARTICULAR; INTRALESIONAL; INTRAMUSCULAR; INTRAVENOUS; SOFT TISSUE at 13:48

## 2025-01-29 ASSESSMENT — PAIN SCALES - GENERAL: PAINLEVEL_OUTOF10: MILD PAIN (1)

## 2025-01-29 NOTE — PATIENT INSTRUCTIONS
Please call the [facility] Triage line if you experience a temperature greater than or equal to 100.4, shaking chills, have uncontrolled nausea, vomiting and/or diarrhea, dizziness, shortness of breath, chest pain, bleeding, unexplained bruising, or if you have any other new/concerning symptoms, questions or concerns. If you wish to speak to a provider before your next infusion visit, please call your care coordinator to notify them so we can adequately serve you. [phone]     Latest Reference Range & Units 01/29/25 12:17   Creatinine 0.67 - 1.17 mg/dL 0.78   GFR Estimate >60 mL/min/1.73m2 >90   WBC 4.0 - 11.0 10e3/uL 7.7   Hemoglobin 13.3 - 17.7 g/dL 14.0   Hematocrit 40.0 - 53.0 % 43.6   Platelet Count 150 - 450 10e3/uL 331   RBC Count 4.40 - 5.90 10e6/uL 4.95   MCV 78 - 100 fL 88   MCH 26.5 - 33.0 pg 28.3   MCHC 31.5 - 36.5 g/dL 32.1   RDW 10.0 - 15.0 % 14.0   % Neutrophils % 71   % Lymphocytes % 16   % Monocytes % 12   % Eosinophils % 1   % Basophils % 0   Absolute Basophils 0.0 - 0.2 10e3/uL 0.0   Absolute Eosinophils 0.0 - 0.7 10e3/uL 0.1   Absolute Immature Granulocytes <=0.4 10e3/uL 0.1   Absolute Lymphocytes 0.8 - 5.3 10e3/uL 1.2   Absolute Monocytes 0.0 - 1.3 10e3/uL 0.9   % Immature Granulocytes % 1   Absolute Neutrophils 1.6 - 8.3 10e3/uL 5.5   Absolute NRBCs 10e3/uL 0.0   NRBCs per 100 WBC <1 /100 0

## 2025-01-29 NOTE — NURSING NOTE
Chief Complaint   Patient presents with    Blood Draw     Labs drawn via  by RN in lab. VS taken.      Labs drawn via venipuncture. Port not accessed at this visit. New placement yesterday. Site appears red and warm to the touch. Reports pain 1/10, afebrile today. Message sent to Machelle Cohn CNP. Vital signs taken. Checked into next appointment.     Machelle Escalante RN

## 2025-01-29 NOTE — NURSING NOTE
"Oncology Rooming Note    January 29, 2025 12:37 PM   Akira Plasencia is a 64 year old male who presents for:    Chief Complaint   Patient presents with    Blood Draw     Labs drawn via  by RN in lab. VS taken.     Oncology Clinic Visit     Primary lung adenocarcinoma     Initial Vitals: /77   Pulse 78   Temp 97.8  F (36.6  C) (Oral)   Resp 18   Wt 71 kg (156 lb 8.8 oz)   SpO2 98%   BMI 26.05 kg/m   Estimated body mass index is 26.05 kg/m  as calculated from the following:    Height as of 1/28/25: 1.651 m (5' 5\").    Weight as of this encounter: 71 kg (156 lb 8.8 oz). Body surface area is 1.8 meters squared.  Mild Pain (1) Comment: Data Unavailable   No LMP for male patient.  Allergies reviewed: Yes  Medications reviewed: Yes    Medications: Medication refills not needed today.  Pharmacy name entered into [employer]: [facility] [address] [place] [address] - [facility]    Frailty Screening:   Is the patient here for a new oncology consult visit in cancer care? 2. No      Clinical concerns: none      Charles Calderon LPN              "

## 2025-01-29 NOTE — PROGRESS NOTES
Infusion Nursing Note:  Akira Plasencia presents today for C1D1 Taxol-Carboplatin.    Patient seen by provider today: Yes: Machelle Cohn CNP    present during visit today: Not Applicable.    Note: Pt saw provider prior to infusion, ok for treatment today. Akira agrees to treatment and declines eSym Surveys.  Akira went to XRT prior to appts at the [facility] today.    TORB 1/29/25 @ 1330 Machelle Cohn CNP - Brandy Mar RN  -Ok for treatment today  -Access PORT for BC x 1 (suspect cellulitis, placed 1/28/25)  -Give chemotherapy through a peripheral IV today    First time receiving chemotherapy today. Chemotherapy teaching, side effects, and schedule reviewed with patient. Pt instructed to call triage (or MD on call if after hours/weekends) with chills/temp >=100.5. Pt stated understanding of plan. +Given a thermometer.    +PORT site red, slightly swollen and tender (photo taken by provider for chart)  +pt verbalizes understanding to seek medical treatment immediately the redness spreads, he has any drainage, pain, fever, or immobility of shoulder     Intravenous Access:  Peripheral IV placed.  Implanted Port.    Treatment Conditions:   Latest Reference Range & Units 01/29/25 12:17   Creatinine 0.67 - 1.17 mg/dL 0.78   GFR Estimate >60 mL/min/1.73m2 >90   WBC 4.0 - 11.0 10e3/uL 7.7   Hemoglobin 13.3 - 17.7 g/dL 14.0   Hematocrit 40.0 - 53.0 % 43.6   Platelet Count 150 - 450 10e3/uL 331   RBC Count 4.40 - 5.90 10e6/uL 4.95   MCV 78 - 100 fL 88   MCH 26.5 - 33.0 pg 28.3   MCHC 31.5 - 36.5 g/dL 32.1   RDW 10.0 - 15.0 % 14.0   % Neutrophils % 71   % Lymphocytes % 16   % Monocytes % 12   % Eosinophils % 1   % Basophils % 0   Absolute Basophils 0.0 - 0.2 10e3/uL 0.0   Absolute Eosinophils 0.0 - 0.7 10e3/uL 0.1   Absolute Immature Granulocytes <=0.4 10e3/uL 0.1   Absolute Lymphocytes 0.8 - 5.3 10e3/uL 1.2   Absolute Monocytes 0.0 - 1.3 10e3/uL 0.9   % Immature Granulocytes % 1   Absolute Neutrophils 1.6 - 8.3  10e3/uL 5.5   Absolute NRBCs 10e3/uL 0.0   NRBCs per 100 WBC <1 /100 0       Post Infusion Assessment:  Patient tolerated infusion without incident.  Blood return noted pre and post infusion.  Site patent and intact, free from redness, edema or discomfort.  No evidence of extravasations.  Access discontinued per protocol.       Discharge Plan:   Prescription refills given for Doxycycline and Compazine.  Discharge instructions reviewed with: Patient.  Patient and/or family verbalized understanding of discharge instructions and all questions answered.  Copy of AVS reviewed with patient and/or family.  Patient will return 2/4 for labs/provider/infusion.  Patient discharged in stable condition accompanied by: self.  Departure Mode: Ambulatory.    Brandy Mar RN

## 2025-01-29 NOTE — LETTER
1/29/2025      Akira Plasencia  [address] [place] [address]      Dear Colleague,    Thank you for referring your patient, Akira Plasencia, to the [facility]. Please see a copy of my visit note below.    January 29, 2025    REASON FOR VISIT: follow up lung cancer    CANCER STAGE:  Cancer Staging   No matching staging information was found for the patient.      HISTORY OF PRESENT ILLNESS:  Akira Plasencia is a 65 yo man who was referred to Dr. Hutchinson for an abnormal CT.  He was treated for a pnuemonia in April 2024.  At that time, he was treated with antibiotics, but he had a repeat CT that showed residual RUL consolidation.  He underwent bronchoscopy on 8/26/24 that showed endobronchial lesion in RUL.  He had a biopsy that was non-diagnostic.   Pet scan on 11/20/24 showed RUL mass with FDG avidity.  There was FDG avid R Upper paratracheal node.   Biopsy was done on 12/13 by CT guidance - this showed lepidic adenocarcinoma, PD-1 <1%, NGS lung panel was negative for any pathologic identified mutation or Fusions.     His case was discussed at tumor board.  He has a large primary lesion.  At the tumor board, it was felt that he was probably resectable and we had discussed doing neoadjuvant chemoimmunotherapy.      He saw Dr. Blanton and while the mass looks like it is potentially resectable, it is very close to large mediastinal blood vessels and it was not clear that it would be resectable.  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  He then met with Dr. Romo to discuss chemoradiation followed by durvalumab.     1/29/25: start of chemo radiation with weekly taxol/carbo    Interval History:   Here prior to chemo  Had port placed yesterday, noticed its reddish  Mild tingling in fingers  Active at baseline, EDEN, unchanged  No hemoptysis      PMH - no  "prior MedHx, COPD by PFTs  SocHx - He is a current active smoker probably about 50 pack years. He lives in [place], [place] with his elderly mother.  He has 2 sisters that lives nearby in [place] as well.      All - nkda    Review Of Systems  10-point review of systems were negative except as noted in HPI.        EXAM:  Blood pressure 124/77, pulse 78, temperature 97.8  F (36.6  C), temperature source Oral, resp. rate 18, weight 71 kg (156 lb 8.8 oz), SpO2 98%.  GEN: alert and oriented x 3, nad  HT: reg rate and rhythm, no murmurs  LUNGS: clear to auscultation bilaterally  ABD: soft, nt, nd, +bs x 4  EXT: no clubbing, cyanosis, or edema  NEURO: CN 2-12 intact, MS 5/5 b/l    See photo in chart of R chest port    Current Outpatient Medications   Medication Sig Dispense Refill     ibuprofen (ADVIL/MOTRIN) 400 MG tablet Take 400 mg by mouth every 6 hours as needed for moderate pain. (Patient not taking: Reported on 1/7/2025)       prochlorperazine (COMPAZINE) 10 MG tablet Take 1 tablet (10 mg) by mouth every 6 hours as needed for nausea or vomiting. 30 tablet 2     UNABLE TO FIND Take 2 capsules by mouth daily as needed (\"for leg cramps\"). MEDICATION NAME: \"Cramp Defense (magnesium)\" (Patient not taking: Reported on 1/7/2025)         Most Recent 3 CBC's:  Recent Labs   Lab Test 01/29/25  1217 01/28/25  1302 12/13/24  0732   WBC 7.7 8.6 9.5   HGB 14.0 13.5 14.3   MCV 88 87 86    339 330   ANEUTAUTO 5.5  --   --      Most Recent 3 BMP's:  Recent Labs   Lab Test 01/28/25  1302 11/20/24  1014     --    POTASSIUM 4.1  --    CHLORIDE 103  --    CO2 27  --    BUN 10.4  --    CR 0.79  --    ANIONGAP 8  --    RAF 9.7  --    GLC 92 88    Most Recent 3 LFT's:No lab results found. Most Recent 2 TSH and T4:No lab results found.  I reviewed the above labs today.    Recent Results (from the past 744 hours)   MR Brain w/o & w Contrast    Narrative     MR BRAIN W/O & W CONTRAST 1/7/2025 1:02 PM    Provided History: looking " for distant mets; Primary lung  adenocarcinoma (H).  ICD-10: Primary lung adenocarcinoma (H)    Comparison: None.    Technique: Multiplanar T1-weighted, axial FLAIR, and susceptibility  images were obtained without intravenous contrast. Following  intravenous gadolinium-based contrast administration, axial  T2-weighted, diffusion, and T1-weighted images (in multiple planes)  were obtained.    Contrast: 7.0mL Gadavist     Findings:  There is no mass effect, midline shift, or evidence of intracranial  hemorrhage. The ventricles are proportionate to the cerebral sulci.  Normal major vascular intracranial flow-voids. Mild generalized  parenchymal volume loss. Scattered T2 hyperintense white matter foci,  most likely represents chronic small vessel ischemic disease.    Postcontrast images demonstrate no abnormal intracranial enhancement.    No abnormality of the skull marrow signal. The visualized portions of  paranasal sinuses, and mastoid air cells are relatively clear. The  orbits are grossly unremarkable.      Impression    Impression: No evidence for intracranial metastatic disease.    VARGHESE LOVE MD         SYSTEM ID:  [id number]   CT Therapy Correlate    Narrative    This exam was marked as non-reportable because it will not be read by a   radiologist or a [facility] non-radiologist provider.               Assessment/Plan  Clinical Stage IIIA NSCLC adenocarcinoma; unresectable - Begin definitive chemoradiation with weekly carbo/taxol. We reviewed possible SE including fatigue, low blood counts, neuropathy, nausea, and infusion reaction. He consents to start treatment today as scheduled. MENDEZ weekly with chemo, he will also see Dr. Lunsford weekly with radiation treatments.     Erythema of R port: concern for start of cellulitis on exam. Will draw culture from site but use PIV for chemo today. Start doxycycline BID x7 days. We reviewed if the redness spreads, he has any drainage, pain, fever, or immobility of  shoulder he should present to ER right away.      50 minutes spent on the date of the encounter doing chart review, review of test results, interpretation of tests, patient visit, and documentation     The longitudinal plan of care for the diagnosis(es)/condition(s) as documented were addressed during this visit. Due to the added complexity in care, I will continue to support Akira in the subsequent management and with ongoing continuity of care.    Machelle Cohn CNP on 1/29/2025 at 1:15 PM        Again, thank you for allowing me to participate in the care of your patient.        Sincerely,        Machelle Cohn CNP    Electronically signed

## 2025-01-29 NOTE — PROGRESS NOTES
January 29, 2025    REASON FOR VISIT: follow up lung cancer    CANCER STAGE:  Cancer Staging   No matching staging information was found for the patient.      HISTORY OF PRESENT ILLNESS:  Akira Plasencia is a 63 yo man who was referred to Dr. Hutchinson for an abnormal CT.  He was treated for a pnuemonia in April 2024.  At that time, he was treated with antibiotics, but he had a repeat CT that showed residual RUL consolidation.  He underwent bronchoscopy on 8/26/24 that showed endobronchial lesion in RUL.  He had a biopsy that was non-diagnostic.   Pet scan on 11/20/24 showed RUL mass with FDG avidity.  There was FDG avid R Upper paratracheal node.   Biopsy was done on 12/13 by CT guidance - this showed lepidic adenocarcinoma, PD-1 <1%, NGS lung panel was negative for any pathologic identified mutation or Fusions.     His case was discussed at tumor board.  He has a large primary lesion.  At the tumor board, it was felt that he was probably resectable and we had discussed doing neoadjuvant chemoimmunotherapy.      He saw Dr. Blanton and while the mass looks like it is potentially resectable, it is very close to large mediastinal blood vessels and it was not clear that it would be resectable.  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  He then met with Dr. Romo to discuss chemoradiation followed by durvalumab.     1/29/25: start of chemo radiation with weekly taxol/carbo    Interval History:   Here prior to chemo  Had port placed yesterday, noticed its reddish  Mild tingling in fingers  Active at baseline, EDEN, unchanged  No hemoptysis      PMH - no prior MedHx, COPD by PFTs  SocHx - He is a current active smoker probably about 50 pack years. He lives in [place], [place] with his elderly mother.  He has 2 sisters that lives nearby in [place] as well.      All - nkda    Review Of  "Systems  10-point review of systems were negative except as noted in HPI.        EXAM:  Blood pressure 124/77, pulse 78, temperature 97.8  F (36.6  C), temperature source Oral, resp. rate 18, weight 71 kg (156 lb 8.8 oz), SpO2 98%.  GEN: alert and oriented x 3, nad  HT: reg rate and rhythm, no murmurs  LUNGS: clear to auscultation bilaterally  ABD: soft, nt, nd, +bs x 4  EXT: no clubbing, cyanosis, or edema  NEURO: CN 2-12 intact, MS 5/5 b/l    See photo in chart of R chest port    Current Outpatient Medications   Medication Sig Dispense Refill    ibuprofen (ADVIL/MOTRIN) 400 MG tablet Take 400 mg by mouth every 6 hours as needed for moderate pain. (Patient not taking: Reported on 1/7/2025)      prochlorperazine (COMPAZINE) 10 MG tablet Take 1 tablet (10 mg) by mouth every 6 hours as needed for nausea or vomiting. 30 tablet 2    UNABLE TO FIND Take 2 capsules by mouth daily as needed (\"for leg cramps\"). MEDICATION NAME: \"Cramp Defense (magnesium)\" (Patient not taking: Reported on 1/7/2025)         Most Recent 3 CBC's:  Recent Labs   Lab Test 01/29/25  1217 01/28/25  1302 12/13/24  0732   WBC 7.7 8.6 9.5   HGB 14.0 13.5 14.3   MCV 88 87 86    339 330   ANEUTAUTO 5.5  --   --      Most Recent 3 BMP's:  Recent Labs   Lab Test 01/28/25  1302 11/20/24  1014     --    POTASSIUM 4.1  --    CHLORIDE 103  --    CO2 27  --    BUN 10.4  --    CR 0.79  --    ANIONGAP 8  --    RAF 9.7  --    GLC 92 88    Most Recent 3 LFT's:No lab results found. Most Recent 2 TSH and T4:No lab results found.  I reviewed the above labs today.    Recent Results (from the past 744 hours)   MR Brain w/o & w Contrast    Narrative     MR BRAIN W/O & W CONTRAST 1/7/2025 1:02 PM    Provided History: looking for distant mets; Primary lung  adenocarcinoma (H).  ICD-10: Primary lung adenocarcinoma (H)    Comparison: None.    Technique: Multiplanar T1-weighted, axial FLAIR, and susceptibility  images were obtained without intravenous contrast. " Following  intravenous gadolinium-based contrast administration, axial  T2-weighted, diffusion, and T1-weighted images (in multiple planes)  were obtained.    Contrast: 7.0mL Gadavist     Findings:  There is no mass effect, midline shift, or evidence of intracranial  hemorrhage. The ventricles are proportionate to the cerebral sulci.  Normal major vascular intracranial flow-voids. Mild generalized  parenchymal volume loss. Scattered T2 hyperintense white matter foci,  most likely represents chronic small vessel ischemic disease.    Postcontrast images demonstrate no abnormal intracranial enhancement.    No abnormality of the skull marrow signal. The visualized portions of  paranasal sinuses, and mastoid air cells are relatively clear. The  orbits are grossly unremarkable.      Impression    Impression: No evidence for intracranial metastatic disease.    VARGHESE LOVE MD         SYSTEM ID:  [id number]   CT Therapy Correlate    Narrative    This exam was marked as non-reportable because it will not be read by a   radiologist or a [facility] non-radiologist provider.               Assessment/Plan  Clinical Stage IIIA NSCLC adenocarcinoma; unresectable - Begin definitive chemoradiation with weekly carbo/taxol. We reviewed possible SE including fatigue, low blood counts, neuropathy, nausea, and infusion reaction. He consents to start treatment today as scheduled. MENDEZ weekly with chemo, he will also see Dr. Lunsford weekly with radiation treatments.     Erythema of R port: concern for start of cellulitis on exam. Will draw culture from site but use PIV for chemo today. Start doxycycline BID x7 days. We reviewed if the redness spreads, he has any drainage, pain, fever, or immobility of shoulder he should present to ER right away.      50 minutes spent on the date of the encounter doing chart review, review of test results, interpretation of tests, patient visit, and documentation     The longitudinal plan of care for  the diagnosis(es)/condition(s) as documented were addressed during this visit. Due to the added complexity in care, I will continue to support Akira in the subsequent management and with ongoing continuity of care.    Machelle Cohn CNP on 1/29/2025 at 1:15 PM

## 2025-01-30 ENCOUNTER — APPOINTMENT (OUTPATIENT)
Dept: RADIATION ONCOLOGY | Facility: CLINIC | Age: 65
End: 2025-01-30
Attending: RADIOLOGY
Payer: MEDICARE

## 2025-01-30 ENCOUNTER — OFFICE VISIT (OUTPATIENT)
Dept: RADIATION ONCOLOGY | Facility: CLINIC | Age: 65
End: 2025-01-30
Attending: STUDENT IN AN ORGANIZED HEALTH CARE EDUCATION/TRAINING PROGRAM
Payer: MEDICARE

## 2025-01-30 VITALS
DIASTOLIC BLOOD PRESSURE: 74 MMHG | HEART RATE: 80 BPM | BODY MASS INDEX: 26.16 KG/M2 | SYSTOLIC BLOOD PRESSURE: 128 MMHG | WEIGHT: 157.2 LBS

## 2025-01-30 DIAGNOSIS — C34.91 PRIMARY LUNG ADENOCARCINOMA, RIGHT (H): Primary | ICD-10-CM

## 2025-01-30 LAB — BACTERIA BLD CULT: NORMAL

## 2025-01-30 PROCEDURE — 77386 HC IMRT TREATMENT DELIVERY, COMPLEX: CPT | Performed by: STUDENT IN AN ORGANIZED HEALTH CARE EDUCATION/TRAINING PROGRAM

## 2025-01-30 NOTE — LETTER
2025      Akira Plasencia  [address] [place] [address]      Dear Colleague,    Thank you for referring your patient, Akira Plasencia, to the [facility] RADIATION ONCOLOGY. Please see a copy of my visit note below.    Radiation Oncology On Treatment Visit Note    Akira Plasencia      Date: 2025   MRN: [id number]      : 1960  Diagnosis: Lung Cancer    Treatment Summary to Date   RUL NSCLC Current Dose: 600/6000 cGy Fractions: 3/30      Chemotherapy  Chemo concurrent with radx?: Yes  Oncologist: Dr Romo  Drug Name/Frequency 1: Taxol/Carboplatin    ED Visit/Hospital Admissions: None    Treatment Breaks: None    Subjective: Patient is doing well.  No complaints.    Nursing ROS:   Nutrition Alteration  Diet Type: Patient's Preference  Skin  Skin Reaction: 0 - No changes      ENT and Mouth Exam  Mucositis - Current: 0 - None      Gastrointestinal  Nausea: 0 - None     Psychosocial  Mood - Anxiety: 0 - Normal  Pain Assessment  0-10 Pain Scale: 0    Objective:   /74   Pulse 80   Wt 71.3 kg (157 lb 3.2 oz)   BMI 26.16 kg/m    Gen: Appears well, NAD  Skin: No erythema    Assessment:    Tolerating radiation therapy well.  All questions and concerns addressed.    Toxicities: none    Plan:   Continue current therapy    Mosaiq chart and setup information reviewed. Imaging reviewed.     Patient was seen and discussed with my attending physician, Dr. Lunsford.    Yann Sanchez MD, MS PGY-4  Radiation Oncology  Department of Radiation Oncology  [facility]  Phone: [phone]     Attestation signed by Marvin Lunsford MD at 2025 10:04 AM:  Physician Attestation  I, Marvin Lunsford MD, saw this patient and agree with the findings and plan of care as documented in the note.      Additional Findings: Mr. Plasencia is tolerating treatment without issues thus far, as expected. Counseled on expected side effects. Discussed plan for  initiation of gabapentin ramp next week.    Marvin Lunsford MD       Again, thank you for allowing me to participate in the care of your patient.        Sincerely,        Marvin Lunsford MD    Electronically signed

## 2025-01-30 NOTE — PROGRESS NOTES
Radiation Oncology On Treatment Visit Note    Akira Plasencia      Date: 2025   MRN: [id number]      : 1960  Diagnosis: Lung Cancer    Treatment Summary to Date   RUL NSCLC Current Dose: 600/6000 cGy Fractions: 3/30      Chemotherapy  Chemo concurrent with radx?: Yes  Oncologist: Dr Romo  Drug Name/Frequency 1: Taxol/Carboplatin    ED Visit/Hospital Admissions: None    Treatment Breaks: None    Subjective: Patient is doing well.  No complaints.    Nursing ROS:   Nutrition Alteration  Diet Type: Patient's Preference  Skin  Skin Reaction: 0 - No changes      ENT and Mouth Exam  Mucositis - Current: 0 - None      Gastrointestinal  Nausea: 0 - None     Psychosocial  Mood - Anxiety: 0 - Normal  Pain Assessment  0-10 Pain Scale: 0    Objective:   /74   Pulse 80   Wt 71.3 kg (157 lb 3.2 oz)   BMI 26.16 kg/m    Gen: Appears well, NAD  Skin: No erythema    Assessment:    Tolerating radiation therapy well.  All questions and concerns addressed.    Toxicities: none    Plan:   Continue current therapy    Mosaiq chart and setup information reviewed. Imaging reviewed.     Patient was seen and discussed with my attending physician, Dr. Lunsford.    Yann Sanchez MD, MS PGY-4  Radiation Oncology  Department of Radiation Oncology  [facility] [place]  Phone: [phone]

## 2025-01-31 ENCOUNTER — APPOINTMENT (OUTPATIENT)
Dept: RADIATION ONCOLOGY | Facility: CLINIC | Age: 65
End: 2025-01-31
Attending: RADIOLOGY
Payer: MEDICARE

## 2025-01-31 PROCEDURE — 77427 RADIATION TX MANAGEMENT X5: CPT | Mod: GC | Performed by: STUDENT IN AN ORGANIZED HEALTH CARE EDUCATION/TRAINING PROGRAM

## 2025-01-31 PROCEDURE — 77386 HC IMRT TREATMENT DELIVERY, COMPLEX: CPT | Performed by: STUDENT IN AN ORGANIZED HEALTH CARE EDUCATION/TRAINING PROGRAM

## 2025-01-31 PROCEDURE — 77014 PR CT GUIDE FOR PLACEMENT RADIATION THERAPY FIELDS: CPT | Mod: 26 | Performed by: STUDENT IN AN ORGANIZED HEALTH CARE EDUCATION/TRAINING PROGRAM

## 2025-02-03 ENCOUNTER — APPOINTMENT (OUTPATIENT)
Dept: RADIATION ONCOLOGY | Facility: CLINIC | Age: 65
End: 2025-02-03
Attending: RADIOLOGY
Payer: MEDICARE

## 2025-02-03 LAB — BACTERIA BLD CULT: NO GROWTH

## 2025-02-03 PROCEDURE — 77336 RADIATION PHYSICS CONSULT: CPT | Performed by: STUDENT IN AN ORGANIZED HEALTH CARE EDUCATION/TRAINING PROGRAM

## 2025-02-03 PROCEDURE — 77386 HC IMRT TREATMENT DELIVERY, COMPLEX: CPT | Performed by: STUDENT IN AN ORGANIZED HEALTH CARE EDUCATION/TRAINING PROGRAM

## 2025-02-03 PROCEDURE — 77014 PR CT GUIDE FOR PLACEMENT RADIATION THERAPY FIELDS: CPT | Mod: 26 | Performed by: STUDENT IN AN ORGANIZED HEALTH CARE EDUCATION/TRAINING PROGRAM

## 2025-02-04 ENCOUNTER — APPOINTMENT (OUTPATIENT)
Dept: LAB | Facility: CLINIC | Age: 65
End: 2025-02-04
Attending: NURSE PRACTITIONER
Payer: MEDICARE

## 2025-02-04 ENCOUNTER — INFUSION THERAPY VISIT (OUTPATIENT)
Dept: ONCOLOGY | Facility: CLINIC | Age: 65
End: 2025-02-04
Attending: NURSE PRACTITIONER
Payer: MEDICARE

## 2025-02-04 ENCOUNTER — APPOINTMENT (OUTPATIENT)
Dept: RADIATION ONCOLOGY | Facility: CLINIC | Age: 65
End: 2025-02-04
Attending: RADIOLOGY
Payer: MEDICARE

## 2025-02-04 VITALS
RESPIRATION RATE: 16 BRPM | HEART RATE: 113 BPM | SYSTOLIC BLOOD PRESSURE: 127 MMHG | DIASTOLIC BLOOD PRESSURE: 85 MMHG | TEMPERATURE: 97.7 F | WEIGHT: 152.4 LBS | OXYGEN SATURATION: 98 % | BODY MASS INDEX: 25.36 KG/M2

## 2025-02-04 DIAGNOSIS — C34.91 PRIMARY LUNG ADENOCARCINOMA, RIGHT (H): Primary | ICD-10-CM

## 2025-02-04 DIAGNOSIS — L03.90 CELLULITIS, UNSPECIFIED CELLULITIS SITE: ICD-10-CM

## 2025-02-04 DIAGNOSIS — J44.9 CHRONIC OBSTRUCTIVE PULMONARY DISEASE, UNSPECIFIED COPD TYPE (H): ICD-10-CM

## 2025-02-04 LAB
BASOPHILS # BLD AUTO: 0 10E3/UL (ref 0–0.2)
BASOPHILS NFR BLD AUTO: 0 %
CREAT SERPL-MCNC: 1.09 MG/DL (ref 0.67–1.17)
EGFRCR SERPLBLD CKD-EPI 2021: 75 ML/MIN/1.73M2
EOSINOPHIL # BLD AUTO: 0.1 10E3/UL (ref 0–0.7)
EOSINOPHIL NFR BLD AUTO: 1 %
ERYTHROCYTE [DISTWIDTH] IN BLOOD BY AUTOMATED COUNT: 13.5 % (ref 10–15)
HCT VFR BLD AUTO: 44.4 % (ref 40–53)
HGB BLD-MCNC: 14.5 G/DL (ref 13.3–17.7)
IMM GRANULOCYTES # BLD: 0.1 10E3/UL
IMM GRANULOCYTES NFR BLD: 1 %
LYMPHOCYTES # BLD AUTO: 1.6 10E3/UL (ref 0.8–5.3)
LYMPHOCYTES NFR BLD AUTO: 21 %
MCH RBC QN AUTO: 28.4 PG (ref 26.5–33)
MCHC RBC AUTO-ENTMCNC: 32.7 G/DL (ref 31.5–36.5)
MCV RBC AUTO: 87 FL (ref 78–100)
MONOCYTES # BLD AUTO: 0.6 10E3/UL (ref 0–1.3)
MONOCYTES NFR BLD AUTO: 8 %
NEUTROPHILS # BLD AUTO: 5.2 10E3/UL (ref 1.6–8.3)
NEUTROPHILS NFR BLD AUTO: 69 %
NRBC # BLD AUTO: 0 10E3/UL
NRBC BLD AUTO-RTO: 0 /100
PLATELET # BLD AUTO: 302 10E3/UL (ref 150–450)
RBC # BLD AUTO: 5.11 10E6/UL (ref 4.4–5.9)
WBC # BLD AUTO: 7.6 10E3/UL (ref 4–11)

## 2025-02-04 PROCEDURE — 85018 HEMOGLOBIN: CPT | Performed by: INTERNAL MEDICINE

## 2025-02-04 PROCEDURE — G0463 HOSPITAL OUTPT CLINIC VISIT: HCPCS | Performed by: NURSE PRACTITIONER

## 2025-02-04 PROCEDURE — 250N000011 HC RX IP 250 OP 636: Performed by: NURSE PRACTITIONER

## 2025-02-04 PROCEDURE — 258N000003 HC RX IP 258 OP 636: Performed by: INTERNAL MEDICINE

## 2025-02-04 PROCEDURE — 250N000013 HC RX MED GY IP 250 OP 250 PS 637: Performed by: NURSE PRACTITIONER

## 2025-02-04 PROCEDURE — 250N000011 HC RX IP 250 OP 636: Performed by: INTERNAL MEDICINE

## 2025-02-04 PROCEDURE — 96417 CHEMO IV INFUS EACH ADDL SEQ: CPT

## 2025-02-04 PROCEDURE — 250N000012 HC RX MED GY IP 250 OP 636 PS 637: Performed by: INTERNAL MEDICINE

## 2025-02-04 PROCEDURE — 85041 AUTOMATED RBC COUNT: CPT | Performed by: INTERNAL MEDICINE

## 2025-02-04 PROCEDURE — 96375 TX/PRO/DX INJ NEW DRUG ADDON: CPT

## 2025-02-04 PROCEDURE — G2211 COMPLEX E/M VISIT ADD ON: HCPCS | Performed by: NURSE PRACTITIONER

## 2025-02-04 PROCEDURE — 96413 CHEMO IV INFUSION 1 HR: CPT

## 2025-02-04 PROCEDURE — 82565 ASSAY OF CREATININE: CPT | Performed by: INTERNAL MEDICINE

## 2025-02-04 PROCEDURE — 77386 HC IMRT TREATMENT DELIVERY, COMPLEX: CPT | Performed by: STUDENT IN AN ORGANIZED HEALTH CARE EDUCATION/TRAINING PROGRAM

## 2025-02-04 PROCEDURE — 36591 DRAW BLOOD OFF VENOUS DEVICE: CPT | Performed by: INTERNAL MEDICINE

## 2025-02-04 PROCEDURE — 85004 AUTOMATED DIFF WBC COUNT: CPT | Performed by: INTERNAL MEDICINE

## 2025-02-04 PROCEDURE — 99214 OFFICE O/P EST MOD 30 MIN: CPT | Performed by: NURSE PRACTITIONER

## 2025-02-04 RX ORDER — DEXAMETHASONE 4 MG/1
20 TABLET ORAL ONCE
Status: COMPLETED | OUTPATIENT
Start: 2025-02-04 | End: 2025-02-04

## 2025-02-04 RX ORDER — DIPHENHYDRAMINE HCL 25 MG
25 CAPSULE ORAL ONCE
Status: CANCELLED
Start: 2025-02-05

## 2025-02-04 RX ORDER — HEPARIN SODIUM (PORCINE) LOCK FLUSH IV SOLN 100 UNIT/ML 100 UNIT/ML
5 SOLUTION INTRAVENOUS
Status: DISCONTINUED | OUTPATIENT
Start: 2025-02-04 | End: 2025-02-04 | Stop reason: HOSPADM

## 2025-02-04 RX ORDER — DIPHENHYDRAMINE HYDROCHLORIDE 50 MG/ML
25 INJECTION INTRAMUSCULAR; INTRAVENOUS EVERY 6 HOURS PRN
Status: CANCELLED
Start: 2025-02-05

## 2025-02-04 RX ORDER — ONDANSETRON 2 MG/ML
8 INJECTION INTRAMUSCULAR; INTRAVENOUS ONCE
Status: COMPLETED | OUTPATIENT
Start: 2025-02-04 | End: 2025-02-04

## 2025-02-04 RX ORDER — DIPHENHYDRAMINE HCL 25 MG
25 CAPSULE ORAL ONCE
Status: COMPLETED | OUTPATIENT
Start: 2025-02-04 | End: 2025-02-04

## 2025-02-04 RX ORDER — DIPHENHYDRAMINE HYDROCHLORIDE 50 MG/ML
25 INJECTION INTRAMUSCULAR; INTRAVENOUS
Status: CANCELLED
Start: 2025-02-05

## 2025-02-04 RX ORDER — HEPARIN SODIUM (PORCINE) LOCK FLUSH IV SOLN 100 UNIT/ML 100 UNIT/ML
5 SOLUTION INTRAVENOUS ONCE
Status: COMPLETED | OUTPATIENT
Start: 2025-02-04 | End: 2025-02-04

## 2025-02-04 RX ADMIN — SODIUM CHLORIDE 50 ML: 9 INJECTION, SOLUTION INTRAVENOUS at 15:12

## 2025-02-04 RX ADMIN — DIPHENHYDRAMINE HYDROCHLORIDE 25 MG: 25 CAPSULE ORAL at 13:21

## 2025-02-04 RX ADMIN — ONDANSETRON 8 MG: 2 INJECTION INTRAMUSCULAR; INTRAVENOUS at 13:21

## 2025-02-04 RX ADMIN — CARBOPLATIN 180 MG: 10 INJECTION, SOLUTION INTRAVENOUS at 15:12

## 2025-02-04 RX ADMIN — FAMOTIDINE 20 MG: 10 INJECTION INTRAVENOUS at 13:21

## 2025-02-04 RX ADMIN — PACLITAXEL 82 MG: 6 INJECTION, SOLUTION INTRAVENOUS at 14:06

## 2025-02-04 RX ADMIN — HEPARIN 5 ML: 100 SYRINGE at 15:44

## 2025-02-04 RX ADMIN — DEXAMETHASONE 20 MG: 4 TABLET ORAL at 13:22

## 2025-02-04 RX ADMIN — HEPARIN 5 ML: 100 SYRINGE at 12:32

## 2025-02-04 ASSESSMENT — PAIN SCALES - GENERAL: PAINLEVEL_OUTOF10: NO PAIN (0)

## 2025-02-04 NOTE — PATIENT INSTRUCTIONS
[facility] Triage and after hours / weekends / holidays:  [phone]    Please call the triage or after hours line if you experience a temperature greater than or equal to 100.4, shaking chills, have uncontrolled nausea, vomiting and/or diarrhea, dizziness, shortness of breath, chest pain, bleeding, unexplained bruising, or if you have any other new/concerning symptoms, questions or concerns.      If you are having any concerning symptoms or wish to speak to a provider before your next infusion visit, please call triage to notify them so we can adequately serve you.     If you need a refill on a narcotic prescription or other medication, please call before your infusion appointment.                February 2025 Sunday Monday Tuesday Wednesday Thursday Friday Saturday                                 1       2     3    TREATMENT   8:30 AM   (45 min.)   [facility]   [facility] Radiation Oncology 4    TREATMENT   8:30 AM   (45 min.)   [facility]   [facility] Radiation Oncology    LAB CENTRAL  12:15 PM   (15 min.)   [facility]   [facility]    RETURN ACTIVE TREATMENT  12:45 PM   (45 min.)   Machelle Cohn CNP   [facility]    ONC INFUSION 2 HR (120 MIN)   2:30 PM   (120 min.)    ONC INFUSION NURSE   [facility] 5    TREATMENT   8:30 AM   (45 min.)   [facility]   [facility] Radiation Oncology 6    TREATMENT   8:30 AM   (45 min.)   [facility]   [facility] Radiation Oncology    OTV   9:00 AM   (30 min.)   Marvin Lunsford MD   [facility] Radiation Oncology 7    TREATMENT   8:30 AM   (45 min.)   [facility]   [facility] Radiation Oncology 8       9     10    TREATMENT   8:30 AM   (45 min.)   [facility]   [facility] Radiation Oncology 11    TREATMENT   8:30 AM   (45 min.)   P RAD ONC [facility]HELSI     [facility] Radiation Oncology    LAB CENTRAL   9:45 AM   (15 min.)   [facility]   [facility]    RETURN ACTIVE TREATMENT  10:15 AM   (45 min.)   Raquel Montanez APRN CNP   [facility]    ONC INFUSION 2 HR (120 MIN)  11:30 AM   (120 min.)   [facility] ON[facility] INFUSION NURSE   [facility] 12    TREATMENT   8:30 AM   (45 min.)   [facility]   [facility] Radiation Oncology 13    TREATMENT   8:30 AM   (45 min.)   [facility]   [facility] Radiation Oncology    OTV   9:00 AM   (30 min.)   Marvin Lunsford MD   [facility] Radiation Oncology 14    TREATMENT   8:30 AM   (45 min.)   [facility]   [facility] Radiation Oncology 15       16     17    TREATMENT   8:30 AM   (45 min.)   [facility]   [facility] Radiation Oncology 18    TREATMENT   8:30 AM   (45 min.)   [facility]   [facility] Radiation Oncology    LAB CENTRAL  10:30 AM   (15 min.)   [facility]   [facility]    RETURN ACTIVE TREATMENT  10:45 AM   (45 min.)   Machelle Cohn CNP   [facility]    ONC INFUSION 2 HR (120 MIN)   2:30 PM   (120 min.)   [facility] ONC INFUSION NURSE   [facility] 19    TREATMENT   8:30 AM   (45 min.)   [facility]   [facility] Radiation Oncology 20    TREATMENT   8:30 AM   (45 min.)   [facility]   [facility] Radiation Oncology    OTV   9:00 AM   (30 min.)   Marvin Lunsford MD   [facility] Radiation Oncology 21    TREATMENT   8:30 AM   (45 min.)   [facility]   [facility] Radiation Oncology 22       23     24    TREATMENT   8:30 AM   (45 min.)   [facility]   [facility] Radiation Oncology 25    TREATMENT   8:30 AM   (45 min.)   [facility]   [facility]  Radiation Oncology    LAB CENTRAL  10:00 AM   (15 min.)   [facility]   [facility]    RETURN ACTIVE TREATMENT  10:15 AM   (45 min.)   Raquel Montanez APRN CNP   [facility]    ONC INFUSION 2 HR (120 MIN)  11:30 AM   (120 min.)   [facility] ONC INFUSION NURSE   [facility] 26    TREATMENT   8:30 AM   (45 min.)   [facility]   [facility] Radiation Oncology 27    TREATMENT   8:30 AM   (45 min.)   [facility]   [facility] Radiation Oncology    OTV   9:00 AM   (30 min.)   Marvin Lunsford MD   [facility] Radiation Oncology 28    TREATMENT   8:30 AM   (45 min.)   [facility]   [facility] Radiation Oncology                  March 2025 Sunday Monday Tuesday Wednesday Thursday Friday Saturday                                 1       2     3    TREATMENT   8:30 AM   (45 min.)   [facility]   [facility] Radiation Oncology 4    TREATMENT   8:30 AM   (45 min.)   [facility]   [facility] Radiation Oncology    LAB CENTRAL  10:00 AM   (15 min.)   [facility]   [facility]    RETURN ACTIVE TREATMENT  10:15 AM   (45 min.)   Raquel Montanez APRN CNP   [facility]    ONC INFUSION 2 HR (120 MIN)  12:00 PM   (120 min.)   [facility] ONC INFUSION NURSE   [facility] 5    TREATMENT   8:30 AM   (45 min.)   [facility]   [facility] Radiation Oncology 6    TREATMENT   8:30 AM   (45 min.)   [facility] ON[facility]   [facility] Radiation Oncology    OTV   9:00 AM   (30 min.)   Marvin Lunsford MD   [facility] Radiation Oncology 7    TREATMENT   8:30 AM   (45 min.)   [facility] ONC [facility]   [facility] Radiation Oncology 8       9     10    TREATMENT   8:30 AM   (45 min.)   [facility] ON[facility]   [facility] Radiation  Oncology 11     12     13     14     15       16     17     18     19     20    LAB CENTRAL   3:15 PM   (15 min.)   [facility]   [facility]    RETURN CCSL   3:45 PM   (30 min.)   Aamir Romo DO   [facility] 21     22       23     24     25     26     27     28     29       30  Happy Birthday!     31                                             Recent Results (from the past 24 hours)   Creatinine    Collection Time: 02/04/25 12:31 PM   Result Value Ref Range    Creatinine 1.09 0.67 - 1.17 mg/dL    GFR Estimate 75 >60 mL/min/1.73m2   CBC with platelets and differential    Collection Time: 02/04/25 12:31 PM   Result Value Ref Range    WBC Count 7.6 4.0 - 11.0 10e3/uL    RBC Count 5.11 4.40 - 5.90 10e6/uL    Hemoglobin 14.5 13.3 - 17.7 g/dL    Hematocrit 44.4 40.0 - 53.0 %    MCV 87 78 - 100 fL    MCH 28.4 26.5 - 33.0 pg    MCHC 32.7 31.5 - 36.5 g/dL    RDW 13.5 10.0 - 15.0 %    Platelet Count 302 150 - 450 10e3/uL    % Neutrophils 69 %    % Lymphocytes 21 %    % Monocytes 8 %    % Eosinophils 1 %    % Basophils 0 %    % Immature Granulocytes 1 %    NRBCs per 100 WBC 0 <1 /100    Absolute Neutrophils 5.2 1.6 - 8.3 10e3/uL    Absolute Lymphocytes 1.6 0.8 - 5.3 10e3/uL    Absolute Monocytes 0.6 0.0 - 1.3 10e3/uL    Absolute Eosinophils 0.1 0.0 - 0.7 10e3/uL    Absolute Basophils 0.0 0.0 - 0.2 10e3/uL    Absolute Immature Granulocytes 0.1 <=0.4 10e3/uL    Absolute NRBCs 0.0 10e3/uL

## 2025-02-04 NOTE — PROGRESS NOTES
February 4, 2025    REASON FOR VISIT: follow up lung cancer    CANCER STAGE:  Cancer Staging   No matching staging information was found for the patient.    HISTORY OF PRESENT ILLNESS:  Akira Plasencia is a 65 yo man who was referred to Dr. Hutchinson for an abnormal CT.  He was treated for a pnuemonia in April 2024.  At that time, he was treated with antibiotics, but he had a repeat CT that showed residual RUL consolidation.  He underwent bronchoscopy on 8/26/24 that showed endobronchial lesion in RUL.  He had a biopsy that was non-diagnostic.   Pet scan on 11/20/24 showed RUL mass with FDG avidity.  There was FDG avid R Upper paratracheal node.   Biopsy was done on 12/13 by CT guidance - this showed lepidic adenocarcinoma, PD-1 <1%, NGS lung panel was negative for any pathologic identified mutation or Fusions.     His case was discussed at tumor board.  He has a large primary lesion.  At the tumor board, it was felt that he was probably resectable and we had discussed doing neoadjuvant chemoimmunotherapy.      He saw Dr. Blanton and while the mass looks like it is potentially resectable, it is very close to large mediastinal blood vessels and it was not clear that it would be resectable.  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  He then met with Dr. Romo to discuss chemoradiation followed by durvalumab.     1/29/25: start of chemo radiation with weekly taxol/carbo    Interval History:   Tolerated chemotherapy well  No nausea or taste changes with chemo, good appetite  Completes doxycycline today, feels redness/swelling over port has lessened. No fever  Denies worsening cough, hemoptysis, or SOB; has baseline dyspnea with long activity like stairs.   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  No bowel  "issues      PMH - no prior MedHx, COPD by PFTs  SocHx - He is a current active smoker probably about 50 pack years. He lives in [place], [place] with his elderly mother.  He has 2 sisters that lives nearby in [place] as well.      All - nkda    Review Of Systems  10-point review of systems were negative except as noted in HPI.    EXAM:  Blood pressure 127/85, pulse 113, temperature 97.7  F (36.5  C), temperature source Oral, resp. rate 16, weight 69.1 kg (152 lb 6.4 oz), SpO2 98%.  GEN: alert and oriented x 3, nad  SKIN: faint erythema seen beneath port dressing, no associated edema or drainage. improvement from photo last week  HT: reg rate and rhythm, no murmurs  LUNGS: clear to auscultation bilaterally  ABD: soft, nt, nd, +bs x 4  EXT: no clubbing, cyanosis, or edema  NEURO: CN 2-12 intact, MS 5/5 b/l      Current Outpatient Medications   Medication Sig Dispense Refill    doxycycline hyclate (VIBRAMYCIN) 100 MG capsule Take 1 capsule (100 mg) by mouth 2 times daily. 14 capsule 0    ibuprofen (ADVIL/MOTRIN) 400 MG tablet Take 400 mg by mouth every 6 hours as needed for moderate pain. (Patient not taking: Reported on 1/7/2025)      prochlorperazine (COMPAZINE) 10 MG tablet Take 1 tablet (10 mg) by mouth every 6 hours as needed for nausea or vomiting. 30 tablet 2    UNABLE TO FIND Take 2 capsules by mouth daily as needed (\"for leg cramps\"). MEDICATION NAME: \"Cramp Defense (magnesium)\" (Patient not taking: Reported on 1/7/2025)         Most Recent 3 CBC's:  Recent Labs   Lab Test 01/29/25  1217 01/28/25  1302 12/13/24  0732   WBC 7.7 8.6 9.5   HGB 14.0 13.5 14.3   MCV 88 87 86    339 330   ANEUTAUTO 5.5  --   --      Most Recent 3 BMP's:  Recent Labs   Lab Test 01/29/25  1217 01/28/25  1302 11/20/24  1014   NA  --  138  --    POTASSIUM  --  4.1  --    CHLORIDE  --  103  --    CO2  --  27  --    BUN  --  10.4  --    CR 0.78 0.79  --    ANIONGAP  --  8  --    RAF  --  9.7  --    GLC  --  92 88    Most Recent 3 " LFT's:No lab results found. Most Recent 2 TSH and T4:No lab results found.  I reviewed the above labs today.    Recent Results (from the past 744 hours)   MR Brain w/o & w Contrast    Narrative     MR BRAIN W/O & W CONTRAST 1/7/2025 1:02 PM    Provided History: looking for distant mets; Primary lung  adenocarcinoma (H).  ICD-10: Primary lung adenocarcinoma (H)    Comparison: None.    Technique: Multiplanar T1-weighted, axial FLAIR, and susceptibility  images were obtained without intravenous contrast. Following  intravenous gadolinium-based contrast administration, axial  T2-weighted, diffusion, and T1-weighted images (in multiple planes)  were obtained.    Contrast: 7.0mL Gadavist     Findings:  There is no mass effect, midline shift, or evidence of intracranial  hemorrhage. The ventricles are proportionate to the cerebral sulci.  Normal major vascular intracranial flow-voids. Mild generalized  parenchymal volume loss. Scattered T2 hyperintense white matter foci,  most likely represents chronic small vessel ischemic disease.    Postcontrast images demonstrate no abnormal intracranial enhancement.    No abnormality of the skull marrow signal. The visualized portions of  paranasal sinuses, and mastoid air cells are relatively clear. The  orbits are grossly unremarkable.      Impression    Impression: No evidence for intracranial metastatic disease.    VARGHESE LOVE MD         SYSTEM ID:  [id number]   CT Therapy Correlate    Narrative    This exam was marked as non-reportable because it will not be read by a   radiologist or a [facility] non-radiologist provider.         IR Chest Port Placement > 5 Yrs of Age    Narrative    DIAGNOSIS: Primary lung adenocarcinoma, right (H)    PROCEDURE: IR CHEST PORT PLACEMENT > 5 YRS OF AGE    Impression    IMPRESSION: Completed image-guided placement of 8 Chinese, 25 cm single  lumen power-injectable central venous port via right internal jugular  vein. Catheter tip in the high right  atrium. Aspirates and flushes  freely, heparin locked and is ready for immediate use. No  complication.    ----------    CLINICAL HISTORY: Patient is a 64 y.o. male with lung cancer who  presents for placement of a chest port for chemotherapy. Patient no  NPO therefore no sedation was offered.     PERFORMED BY: Ani Avila PA-C    CONSENT: Written informed consent was obtained and is documented in  the patient record.    MEDICATIONS: 16 mL 1% lidocaine for local anesthesia    NURSING: Patient continuously monitored by trained, independent  observer.    SEDATION TIME: 0 minutes face-to-face    FLUOROSCOPY TIME: .7 minutes    DESCRIPTION: The right neck and upper chest were prepped and draped in  the usual sterile fashion.      Under ultrasound guidance, the right internal jugular vein was  identified and the overlying skin was anesthetized and skin  dermatotomy was made. Under ultrasound guidance, right internal  jugular venipuncture was made with needle. Image saved documenting  venipuncture and patency.    Needle was exchanged over guidewire for a dilator under fluoroscopic  guidance. Length to right atrium was measured with guidewire.  Guidewire and inner dilator were removed. Wire was advanced into  inferior vena cava under fluoroscopic guidance and secured.     The anterior right chest skin was anesthetized and incision was made.  A subcutaneous port pocket was created using a combination of sharp  and blunt dissection. The pocket was irrigated with saline and packed  with gauze to obtain hemostasis. The gauze was removed.      Port catheter was subcutaneously tunneled from the anterior chest  pocket to the internal jugular venipuncture site after path of tunnel  was anesthetized. Catheter cut to length. The dilator was exchanged  over guidewire for a peel-away sheath. Guidewire was removed. Under  fluoroscopic guidance, the catheter was placed through the peel-away  sheath and positioned with its tip in the  right atrium. Peel-away  sheath was removed.      Final port and catheter position saved. Port aspirated and flushed  freely and was heparin locked. The chest incision was closed with  three 3-0 Vicryl interrupted sutures, a running 4-0 Monocryl  subcuticular suture, and topical adhesive. The skin dermatotomy site  overlying the internal jugular venipuncture was closed with topical  adhesive.    COMPLICATIONS: No immediate complication, the patient remained stable  throughout the procedure and tolerated it well.    ESTIMATED BLOOD LOSS: Minimal    SPECIMENS: None    LEE NEUMANN PA-C         SYSTEM ID:  [id number]         Assessment/Plan  Clinical Stage IIIA NSCLC adenocarcinoma; unresectable - Begin definitive chemoradiation with weekly carbo/taxol 1/29/25. Tolerated initiation of treatment well, will reduce benadryl to 25 mg with plans to omit next week along with pepcid if tolerated. Labs and assessment acceptable to proceed with D8 today, note Cr up a bit from our documented baseline so encouraged increase of PO hydration.   -MENDEZ weekly with chemo, he will also see Dr. Lunsford weekly with radiation treatments.     Erythema of R port: completed doxycycline BID x7 days on 2/4/25 with improvement on exam, will have infusion take photo after de-accessed today to help trend.      30 minutes spent on the date of the encounter doing chart review, review of test results, interpretation of tests, patient visit, and documentation     The longitudinal plan of care for the diagnosis(es)/condition(s) as documented were addressed during this visit. Due to the added complexity in care, I will continue to support Akira in the subsequent management and with ongoing continuity of care.    Machelle Cohn, CNP

## 2025-02-04 NOTE — Clinical Note
2/4/2025      Akira Plasencia  [address] [place] [address]      Dear Colleague,    Thank you for referring your patient, Akira Plasencia, to the [facility]. Please see a copy of my visit note below.    February 4, 2025    REASON FOR VISIT: follow up lung cancer    CANCER STAGE:  Cancer Staging   No matching staging information was found for the patient.    HISTORY OF PRESENT ILLNESS:  Akira Plasencia is a 65 yo man who was referred to Dr. Hutchinson for an abnormal CT.  He was treated for a pnuemonia in April 2024.  At that time, he was treated with antibiotics, but he had a repeat CT that showed residual RUL consolidation.  He underwent bronchoscopy on 8/26/24 that showed endobronchial lesion in RUL.  He had a biopsy that was non-diagnostic.   Pet scan on 11/20/24 showed RUL mass with FDG avidity.  There was FDG avid R Upper paratracheal node.   Biopsy was done on 12/13 by CT guidance - this showed lepidic adenocarcinoma, PD-1 <1%, NGS lung panel was negative for any pathologic identified mutation or Fusions.     His case was discussed at tumor board.  He has a large primary lesion.  At the tumor board, it was felt that he was probably resectable and we had discussed doing neoadjuvant chemoimmunotherapy.      He saw Dr. Blanton and while the mass looks like it is potentially resectable, it is very close to large mediastinal blood vessels and it was not clear that it would be resectable.  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  He then met with Dr. Romo to discuss chemoradiation followed by durvalumab.     1/29/25: start of chemo radiation with weekly taxol/carbo    Interval History:   Here prior to chemo  Had port placed yesterday, noticed its reddish  Mild tingling in fingers  Active at baseline, EDEN, unchanged  No hemoptysis      PMH - no prior  "MedHx, COPD by PFTs  SocHx - He is a current active smoker probably about 50 pack years. He lives in [place], [place] with his elderly mother.  He has 2 sisters that lives nearby in [place] as well.      All - nkda    Review Of Systems  10-point review of systems were negative except as noted in HPI.    EXAM:  There were no vitals taken for this visit.  GEN: alert and oriented x 3, nad  HT: reg rate and rhythm, no murmurs  LUNGS: clear to auscultation bilaterally  ABD: soft, nt, nd, +bs x 4  EXT: no clubbing, cyanosis, or edema  NEURO: CN 2-12 intact, MS 5/5 b/l    See photo in chart of R chest port    Current Outpatient Medications   Medication Sig Dispense Refill    doxycycline hyclate (VIBRAMYCIN) 100 MG capsule Take 1 capsule (100 mg) by mouth 2 times daily. 14 capsule 0    ibuprofen (ADVIL/MOTRIN) 400 MG tablet Take 400 mg by mouth every 6 hours as needed for moderate pain. (Patient not taking: Reported on 1/7/2025)      prochlorperazine (COMPAZINE) 10 MG tablet Take 1 tablet (10 mg) by mouth every 6 hours as needed for nausea or vomiting. 30 tablet 2    UNABLE TO FIND Take 2 capsules by mouth daily as needed (\"for leg cramps\"). MEDICATION NAME: \"Cramp Defense (magnesium)\" (Patient not taking: Reported on 1/7/2025)         Most Recent 3 CBC's:  Recent Labs   Lab Test 01/29/25  1217 01/28/25  1302 12/13/24  0732   WBC 7.7 8.6 9.5   HGB 14.0 13.5 14.3   MCV 88 87 86    339 330   ANEUTAUTO 5.5  --   --      Most Recent 3 BMP's:  Recent Labs   Lab Test 01/29/25  1217 01/28/25  1302 11/20/24  1014   NA  --  138  --    POTASSIUM  --  4.1  --    CHLORIDE  --  103  --    CO2  --  27  --    BUN  --  10.4  --    CR 0.78 0.79  --    ANIONGAP  --  8  --    RAF  --  9.7  --    GLC  --  92 88    Most Recent 3 LFT's:No lab results found. Most Recent 2 TSH and T4:No lab results found.  I reviewed the above labs today.    Recent Results (from the past 744 hours)   MR Brain w/o & w Contrast    Narrative     MR BRAIN W/O " & W CONTRAST 1/7/2025 1:02 PM    Provided History: looking for distant mets; Primary lung  adenocarcinoma (H).  ICD-10: Primary lung adenocarcinoma (H)    Comparison: None.    Technique: Multiplanar T1-weighted, axial FLAIR, and susceptibility  images were obtained without intravenous contrast. Following  intravenous gadolinium-based contrast administration, axial  T2-weighted, diffusion, and T1-weighted images (in multiple planes)  were obtained.    Contrast: 7.0mL Gadavist     Findings:  There is no mass effect, midline shift, or evidence of intracranial  hemorrhage. The ventricles are proportionate to the cerebral sulci.  Normal major vascular intracranial flow-voids. Mild generalized  parenchymal volume loss. Scattered T2 hyperintense white matter foci,  most likely represents chronic small vessel ischemic disease.    Postcontrast images demonstrate no abnormal intracranial enhancement.    No abnormality of the skull marrow signal. The visualized portions of  paranasal sinuses, and mastoid air cells are relatively clear. The  orbits are grossly unremarkable.      Impression    Impression: No evidence for intracranial metastatic disease.    VARGHESE LOVE MD         SYSTEM ID:  [id number]   CT Therapy Correlate    Narrative    This exam was marked as non-reportable because it will not be read by a   radiologist or a [facility] non-radiologist provider.         IR Chest Port Placement > 5 Yrs of Age    Narrative    DIAGNOSIS: Primary lung adenocarcinoma, right (H)    PROCEDURE: IR CHEST PORT PLACEMENT > 5 YRS OF AGE    Impression    IMPRESSION: Completed image-guided placement of 8 Greek, 25 cm single  lumen power-injectable central venous port via right internal jugular  vein. Catheter tip in the high right atrium. Aspirates and flushes  freely, heparin locked and is ready for immediate use. No  complication.    ----------    CLINICAL HISTORY: Patient is a 64 y.o. male with lung cancer who  presents for placement  of a chest port for chemotherapy. Patient no  NPO therefore no sedation was offered.     PERFORMED BY: Ani Avila PA-C    CONSENT: Written informed consent was obtained and is documented in  the patient record.    MEDICATIONS: 16 mL 1% lidocaine for local anesthesia    NURSING: Patient continuously monitored by trained, independent  observer.    SEDATION TIME: 0 minutes face-to-face    FLUOROSCOPY TIME: .7 minutes    DESCRIPTION: The right neck and upper chest were prepped and draped in  the usual sterile fashion.      Under ultrasound guidance, the right internal jugular vein was  identified and the overlying skin was anesthetized and skin  dermatotomy was made. Under ultrasound guidance, right internal  jugular venipuncture was made with needle. Image saved documenting  venipuncture and patency.    Needle was exchanged over guidewire for a dilator under fluoroscopic  guidance. Length to right atrium was measured with guidewire.  Guidewire and inner dilator were removed. Wire was advanced into  inferior vena cava under fluoroscopic guidance and secured.     The anterior right chest skin was anesthetized and incision was made.  A subcutaneous port pocket was created using a combination of sharp  and blunt dissection. The pocket was irrigated with saline and packed  with gauze to obtain hemostasis. The gauze was removed.      Port catheter was subcutaneously tunneled from the anterior chest  pocket to the internal jugular venipuncture site after path of tunnel  was anesthetized. Catheter cut to length. The dilator was exchanged  over guidewire for a peel-away sheath. Guidewire was removed. Under  fluoroscopic guidance, the catheter was placed through the peel-away  sheath and positioned with its tip in the right atrium. Peel-away  sheath was removed.      Final port and catheter position saved. Port aspirated and flushed  freely and was heparin locked. The chest incision was closed with  three 3-0 Vicryl  interrupted sutures, a running 4-0 Monocryl  subcuticular suture, and topical adhesive. The skin dermatotomy site  overlying the internal jugular venipuncture was closed with topical  adhesive.    COMPLICATIONS: No immediate complication, the patient remained stable  throughout the procedure and tolerated it well.    ESTIMATED BLOOD LOSS: Minimal    SPECIMENS: None    LEE NEUMANN PA-C         SYSTEM ID:  [id number]         Assessment/Plan  Clinical Stage IIIA NSCLC adenocarcinoma; unresectable - Begin definitive chemoradiation with weekly carbo/taxol. We reviewed possible SE including fatigue, low blood counts, neuropathy, nausea, and infusion reaction. He consents to start treatment today as scheduled. MENDEZ weekly with chemo, he will also see Dr. Lunsford weekly with radiation treatments.     Erythema of R port: concern for start of cellulitis on exam. Will draw culture from site but use PIV for chemo today. Start doxycycline BID x7 days. We reviewed if the redness spreads, he has any drainage, pain, fever, or immobility of shoulder he should present to ER right away.      30 minutes spent on the date of the encounter doing chart review, review of test results, interpretation of tests, patient visit, and documentation     The longitudinal plan of care for the diagnosis(es)/condition(s) as documented were addressed during this visit. Due to the added complexity in care, I will continue to support Akira in the subsequent management and with ongoing continuity of care.    Machelle Cohn CNP           Again, thank you for allowing me to participate in the care of your patient.        Sincerely,        Machelle Cohn CNP    Electronically signed

## 2025-02-04 NOTE — NURSING NOTE
"Oncology Rooming Note    February 4, 2025 12:49 PM   Akira Plasencia is a 64 year old male who presents for:    Chief Complaint   Patient presents with    Blood Draw     Port blood draw by lab RN    Oncology Clinic Visit     Prostate CA     Initial Vitals: /85   Pulse 113   Temp 97.7  F (36.5  C) (Oral)   Resp 16   Wt 69.1 kg (152 lb 6.4 oz)   SpO2 98%   BMI 25.36 kg/m   Estimated body mass index is 25.36 kg/m  as calculated from the following:    Height as of 1/28/25: 1.651 m (5' 5\").    Weight as of this encounter: 69.1 kg (152 lb 6.4 oz). Body surface area is 1.78 meters squared.  No Pain (0) Comment: Data Unavailable   No LMP for male patient.  Allergies reviewed: Yes  Medications reviewed: Yes    Medications: Medication refills not needed today.  Pharmacy name entered into [facility]: [facility] [place], [place] - [address]    Frailty Screening:   Is the patient here for a new oncology consult visit in cancer care? 2. No      Clinical concerns:        Suzie Maldonado              "

## 2025-02-04 NOTE — PROGRESS NOTES
Infusion Nursing Note:  Akira Plasencia presents today for Cycle 2 Day 1 paclitaxel, carboplatin.    Patient seen by provider today: Yes: Machelle Cohn CNP   present during visit today: Not Applicable.    Note: Pt presents to infusion today feeling well. Pt offers no new concerns following visit with provider today.      Per written communication with Machelle Cohn CNP:  - his creatinine is stable but a bit higher than last week. can you encourage him to hydrate well as able?       Intravenous Access:  Implanted Port.    Treatment Conditions:  Lab Results   Component Value Date    HGB 14.5 02/04/2025    WBC 7.6 02/04/2025    ANEUTAUTO 5.2 02/04/2025     02/04/2025        Lab Results   Component Value Date     01/28/2025    POTASSIUM 4.1 01/28/2025    CR 1.09 02/04/2025    RAF 9.7 01/28/2025       Results reviewed, labs MET treatment parameters, ok to proceed with treatment.      Post Infusion Assessment:  Patient tolerated infusion without incident.  Blood return noted pre and post infusion.  Site patent and intact, free from redness, edema or discomfort.  No evidence of extravasations.  Access discontinued per protocol.       Discharge Plan:   Patient declined prescription refills.  Discharge instructions reviewed with: Patient.  Patient and/or family verbalized understanding of discharge instructions and all questions answered.  AVS to patient via [employer].  Patient will return 2/11 for next appointment.   Patient discharged in stable condition accompanied by: self.  Departure Mode: Ambulatory.      Olga Castaneda RN

## 2025-02-04 NOTE — NURSING NOTE
Chief Complaint   Patient presents with    Blood Draw     Port blood draw by lab RN       Port accessed with blood draw and heparin flush by lab RN. Vitals taken and next appointment arrived.    Catrina Anand RN

## 2025-02-05 ENCOUNTER — APPOINTMENT (OUTPATIENT)
Dept: RADIATION ONCOLOGY | Facility: CLINIC | Age: 65
End: 2025-02-05
Attending: RADIOLOGY
Payer: MEDICARE

## 2025-02-05 PROCEDURE — 77386 HC IMRT TREATMENT DELIVERY, COMPLEX: CPT | Performed by: STUDENT IN AN ORGANIZED HEALTH CARE EDUCATION/TRAINING PROGRAM

## 2025-02-05 PROCEDURE — 77014 PR CT GUIDE FOR PLACEMENT RADIATION THERAPY FIELDS: CPT | Mod: 26 | Performed by: STUDENT IN AN ORGANIZED HEALTH CARE EDUCATION/TRAINING PROGRAM

## 2025-02-06 ENCOUNTER — OFFICE VISIT (OUTPATIENT)
Dept: RADIATION ONCOLOGY | Facility: CLINIC | Age: 65
End: 2025-02-06
Attending: STUDENT IN AN ORGANIZED HEALTH CARE EDUCATION/TRAINING PROGRAM
Payer: MEDICARE

## 2025-02-06 ENCOUNTER — APPOINTMENT (OUTPATIENT)
Dept: RADIATION ONCOLOGY | Facility: CLINIC | Age: 65
End: 2025-02-06
Attending: RADIOLOGY
Payer: MEDICARE

## 2025-02-06 VITALS
BODY MASS INDEX: 25.83 KG/M2 | HEART RATE: 94 BPM | WEIGHT: 155.2 LBS | SYSTOLIC BLOOD PRESSURE: 138 MMHG | DIASTOLIC BLOOD PRESSURE: 91 MMHG

## 2025-02-06 DIAGNOSIS — C34.91 PRIMARY LUNG ADENOCARCINOMA, RIGHT (H): Primary | ICD-10-CM

## 2025-02-06 PROCEDURE — 77386 HC IMRT TREATMENT DELIVERY, COMPLEX: CPT | Performed by: STUDENT IN AN ORGANIZED HEALTH CARE EDUCATION/TRAINING PROGRAM

## 2025-02-06 RX ORDER — GABAPENTIN 300 MG/1
CAPSULE ORAL
Qty: 126 CAPSULE | Refills: 0 | Status: SHIPPED | OUTPATIENT
Start: 2025-02-06 | End: 2025-03-22

## 2025-02-06 NOTE — LETTER
2025      Akira Plasencia  [address] [place] [address]      Dear Colleague,    Thank you for referring your patient, Akira Plasencia, to the [facility]. Please see a copy of my visit note below.    Radiation Oncology On Treatment Visit Note    Akira Plasencia      Date: 2025   MRN: [id number]      : 1960  Diagnosis: Lung Cancer    Treatment Summary to Date   RUL NSCLC Current Dose: 1600/6000 cGy Fractions:       Chemotherapy  Chemo concurrent with radx?: Yes  Oncologist: Dr Romo  Drug Name/Frequency 1: Taxol/Carboplatin    ED Visit/Hospital Admissions: None    Treatment Breaks: None    Subjective: Patient doing overall well.  Reports having a dry cough.  Otherwise no complaints.    Nursing ROS:   Nutrition Alteration  Diet Type: Patient's Preference  Skin  Skin Reaction: 0 - No changes      ENT and Mouth Exam  Mucositis - Current: 0 - None      Gastrointestinal  Nausea: 0 - None     Psychosocial  Mood - Anxiety: 0 - Normal  Pain Assessment  0-10 Pain Scale: 0    Objective:   BP (!) 138/91   Pulse 94   Wt 70.4 kg (155 lb 3.2 oz)   BMI 25.83 kg/m    Gen: Appears well, NAD  Skin: No erythema    Assessment:    Tolerating radiation therapy well.  All questions and concerns addressed.    Toxicities:  Grade 1 cough    Plan:   Continue current therapy  Patient prescribed Magic mouthwash in anticipation of any esophagitis  Patient prescribed gabapentin in anticipation of odynophagia    Mosaiq chart and setup information reviewed. Imaging reviewed.     Patient was seen and discussed with my attending physician, Dr. Lunsford.    Yann Sanchez MD, MS PGY-4  Radiation Oncology  Department of Radiation Oncology  [facility]  Phone: [phone]      Again, thank you for allowing me to participate in the care of your patient.        Sincerely,        Marvin Lunsford MD    Electronically signed

## 2025-02-06 NOTE — PROGRESS NOTES
Radiation Oncology On Treatment Visit Note    Akira Plasencia      Date: 2025   MRN: [id number]      : 1960  Diagnosis: Lung Cancer    Treatment Summary to Date   RUL NSCLC Current Dose: 1600/6000 cGy Fractions:       Chemotherapy  Chemo concurrent with radx?: Yes  Oncologist: Dr Romo  Drug Name/Frequency 1: Taxol/Carboplatin    ED Visit/Hospital Admissions: None    Treatment Breaks: None    Subjective: Patient doing overall well.  Reports having a dry cough.  Otherwise no complaints.    Nursing ROS:   Nutrition Alteration  Diet Type: Patient's Preference  Skin  Skin Reaction: 0 - No changes      ENT and Mouth Exam  Mucositis - Current: 0 - None      Gastrointestinal  Nausea: 0 - None     Psychosocial  Mood - Anxiety: 0 - Normal  Pain Assessment  0-10 Pain Scale: 0    Objective:   BP (!) 138/91   Pulse 94   Wt 70.4 kg (155 lb 3.2 oz)   BMI 25.83 kg/m    Gen: Appears well, NAD  Skin: No erythema    Assessment:    Tolerating radiation therapy well.  All questions and concerns addressed.    Toxicities:  Grade 1 cough    Plan:   Continue current therapy  Patient prescribed Magic mouthwash in anticipation of any esophagitis  Patient prescribed gabapentin in anticipation of odynophagia    Mosaiq chart and setup information reviewed. Imaging reviewed.     Patient was seen and discussed with my attending physician, Dr. Lunsford.    Yann Sanchez MD, MS PGY-4  Radiation Oncology  Department of Radiation Oncology  [facility]  Phone: [phone]

## 2025-02-07 ENCOUNTER — APPOINTMENT (OUTPATIENT)
Dept: RADIATION ONCOLOGY | Facility: CLINIC | Age: 65
End: 2025-02-07
Attending: RADIOLOGY
Payer: MEDICARE

## 2025-02-07 PROCEDURE — 77014 PR CT GUIDE FOR PLACEMENT RADIATION THERAPY FIELDS: CPT | Mod: 26 | Performed by: STUDENT IN AN ORGANIZED HEALTH CARE EDUCATION/TRAINING PROGRAM

## 2025-02-07 PROCEDURE — 77386 HC IMRT TREATMENT DELIVERY, COMPLEX: CPT | Performed by: STUDENT IN AN ORGANIZED HEALTH CARE EDUCATION/TRAINING PROGRAM

## 2025-02-10 ENCOUNTER — APPOINTMENT (OUTPATIENT)
Dept: RADIATION ONCOLOGY | Facility: CLINIC | Age: 65
End: 2025-02-10
Attending: RADIOLOGY
Payer: MEDICARE

## 2025-02-10 PROCEDURE — 77386 HC IMRT TREATMENT DELIVERY, COMPLEX: CPT | Performed by: STUDENT IN AN ORGANIZED HEALTH CARE EDUCATION/TRAINING PROGRAM

## 2025-02-10 PROCEDURE — 77336 RADIATION PHYSICS CONSULT: CPT | Performed by: STUDENT IN AN ORGANIZED HEALTH CARE EDUCATION/TRAINING PROGRAM

## 2025-02-10 PROCEDURE — 77014 PR CT GUIDE FOR PLACEMENT RADIATION THERAPY FIELDS: CPT | Mod: 26 | Performed by: STUDENT IN AN ORGANIZED HEALTH CARE EDUCATION/TRAINING PROGRAM

## 2025-02-10 PROCEDURE — 77427 RADIATION TX MANAGEMENT X5: CPT | Mod: GC | Performed by: STUDENT IN AN ORGANIZED HEALTH CARE EDUCATION/TRAINING PROGRAM

## 2025-02-11 ENCOUNTER — APPOINTMENT (OUTPATIENT)
Dept: LAB | Facility: CLINIC | Age: 65
End: 2025-02-11
Attending: NURSE PRACTITIONER
Payer: MEDICARE

## 2025-02-11 ENCOUNTER — ONCOLOGY VISIT (OUTPATIENT)
Dept: ONCOLOGY | Facility: CLINIC | Age: 65
End: 2025-02-11
Attending: NURSE PRACTITIONER
Payer: MEDICARE

## 2025-02-11 ENCOUNTER — APPOINTMENT (OUTPATIENT)
Dept: RADIATION ONCOLOGY | Facility: CLINIC | Age: 65
End: 2025-02-11
Attending: RADIOLOGY
Payer: MEDICARE

## 2025-02-11 VITALS
WEIGHT: 154.1 LBS | DIASTOLIC BLOOD PRESSURE: 72 MMHG | RESPIRATION RATE: 16 BRPM | OXYGEN SATURATION: 99 % | HEART RATE: 109 BPM | TEMPERATURE: 97.7 F | SYSTOLIC BLOOD PRESSURE: 109 MMHG | BODY MASS INDEX: 25.64 KG/M2

## 2025-02-11 DIAGNOSIS — C34.91 PRIMARY LUNG ADENOCARCINOMA, RIGHT (H): Primary | ICD-10-CM

## 2025-02-11 LAB
BASOPHILS # BLD AUTO: 0 10E3/UL (ref 0–0.2)
BASOPHILS NFR BLD AUTO: 1 %
CREAT SERPL-MCNC: 0.73 MG/DL (ref 0.67–1.17)
EGFRCR SERPLBLD CKD-EPI 2021: >90 ML/MIN/1.73M2
EOSINOPHIL # BLD AUTO: 0.1 10E3/UL (ref 0–0.7)
EOSINOPHIL NFR BLD AUTO: 1 %
ERYTHROCYTE [DISTWIDTH] IN BLOOD BY AUTOMATED COUNT: 14 % (ref 10–15)
HCT VFR BLD AUTO: 44.4 % (ref 40–53)
HGB BLD-MCNC: 14.2 G/DL (ref 13.3–17.7)
IMM GRANULOCYTES # BLD: 0.1 10E3/UL
IMM GRANULOCYTES NFR BLD: 1 %
LYMPHOCYTES # BLD AUTO: 0.9 10E3/UL (ref 0.8–5.3)
LYMPHOCYTES NFR BLD AUTO: 14 %
MCH RBC QN AUTO: 28.4 PG (ref 26.5–33)
MCHC RBC AUTO-ENTMCNC: 32 G/DL (ref 31.5–36.5)
MCV RBC AUTO: 89 FL (ref 78–100)
MONOCYTES # BLD AUTO: 0.6 10E3/UL (ref 0–1.3)
MONOCYTES NFR BLD AUTO: 10 %
NEUTROPHILS # BLD AUTO: 4.6 10E3/UL (ref 1.6–8.3)
NEUTROPHILS NFR BLD AUTO: 74 %
NRBC # BLD AUTO: 0 10E3/UL
NRBC BLD AUTO-RTO: 0 /100
PLATELET # BLD AUTO: 269 10E3/UL (ref 150–450)
RBC # BLD AUTO: 5 10E6/UL (ref 4.4–5.9)
WBC # BLD AUTO: 6.2 10E3/UL (ref 4–11)

## 2025-02-11 PROCEDURE — 96375 TX/PRO/DX INJ NEW DRUG ADDON: CPT

## 2025-02-11 PROCEDURE — 250N000012 HC RX MED GY IP 250 OP 636 PS 637: Performed by: INTERNAL MEDICINE

## 2025-02-11 PROCEDURE — 250N000011 HC RX IP 250 OP 636: Performed by: NURSE PRACTITIONER

## 2025-02-11 PROCEDURE — 82565 ASSAY OF CREATININE: CPT | Performed by: INTERNAL MEDICINE

## 2025-02-11 PROCEDURE — G0463 HOSPITAL OUTPT CLINIC VISIT: HCPCS | Performed by: NURSE PRACTITIONER

## 2025-02-11 PROCEDURE — 258N000003 HC RX IP 258 OP 636: Performed by: INTERNAL MEDICINE

## 2025-02-11 PROCEDURE — 77386 HC IMRT TREATMENT DELIVERY, COMPLEX: CPT | Performed by: STUDENT IN AN ORGANIZED HEALTH CARE EDUCATION/TRAINING PROGRAM

## 2025-02-11 PROCEDURE — 250N000011 HC RX IP 250 OP 636: Performed by: INTERNAL MEDICINE

## 2025-02-11 PROCEDURE — G2211 COMPLEX E/M VISIT ADD ON: HCPCS | Performed by: NURSE PRACTITIONER

## 2025-02-11 PROCEDURE — 96417 CHEMO IV INFUS EACH ADDL SEQ: CPT

## 2025-02-11 PROCEDURE — 99214 OFFICE O/P EST MOD 30 MIN: CPT | Performed by: NURSE PRACTITIONER

## 2025-02-11 PROCEDURE — 36591 DRAW BLOOD OFF VENOUS DEVICE: CPT | Performed by: INTERNAL MEDICINE

## 2025-02-11 PROCEDURE — G0463 HOSPITAL OUTPT CLINIC VISIT: HCPCS | Mod: 25 | Performed by: NURSE PRACTITIONER

## 2025-02-11 PROCEDURE — 85025 COMPLETE CBC W/AUTO DIFF WBC: CPT | Performed by: INTERNAL MEDICINE

## 2025-02-11 PROCEDURE — 96413 CHEMO IV INFUSION 1 HR: CPT

## 2025-02-11 RX ORDER — DEXAMETHASONE 4 MG/1
12 TABLET ORAL ONCE
Status: COMPLETED | OUTPATIENT
Start: 2025-02-11 | End: 2025-02-11

## 2025-02-11 RX ORDER — HEPARIN SODIUM (PORCINE) LOCK FLUSH IV SOLN 100 UNIT/ML 100 UNIT/ML
5 SOLUTION INTRAVENOUS
Status: DISCONTINUED | OUTPATIENT
Start: 2025-02-11 | End: 2025-02-11 | Stop reason: HOSPADM

## 2025-02-11 RX ORDER — ONDANSETRON 2 MG/ML
8 INJECTION INTRAMUSCULAR; INTRAVENOUS ONCE
Status: COMPLETED | OUTPATIENT
Start: 2025-02-11 | End: 2025-02-11

## 2025-02-11 RX ORDER — HEPARIN SODIUM (PORCINE) LOCK FLUSH IV SOLN 100 UNIT/ML 100 UNIT/ML
5 SOLUTION INTRAVENOUS
Status: COMPLETED | OUTPATIENT
Start: 2025-02-11 | End: 2025-02-11

## 2025-02-11 RX ADMIN — DEXAMETHASONE 12 MG: 4 TABLET ORAL at 11:30

## 2025-02-11 RX ADMIN — HEPARIN 3 ML: 100 SYRINGE at 10:04

## 2025-02-11 RX ADMIN — ONDANSETRON 8 MG: 2 INJECTION INTRAMUSCULAR; INTRAVENOUS at 11:30

## 2025-02-11 RX ADMIN — PACLITAXEL 82 MG: 6 INJECTION, SOLUTION INTRAVENOUS at 12:21

## 2025-02-11 RX ADMIN — CARBOPLATIN 250 MG: 10 INJECTION, SOLUTION INTRAVENOUS at 13:24

## 2025-02-11 RX ADMIN — HEPARIN 5 ML: 100 SYRINGE at 13:58

## 2025-02-11 ASSESSMENT — PAIN SCALES - GENERAL: PAINLEVEL_OUTOF10: NO PAIN (0)

## 2025-02-11 NOTE — NURSING NOTE
"Oncology Rooming Note    February 11, 2025 10:24 AM   Akira Plasencia is a 64 year old male who presents for:    Chief Complaint   Patient presents with    Port Draw     Labs drawn from port by rn.  VS taken.    Oncology Clinic Visit     RTN Primary lung adenocarcinoma      Initial Vitals: /72 (BP Location: Right arm, Patient Position: Sitting, Cuff Size: Adult Regular)   Pulse 109   Temp 97.7  F (36.5  C) (Oral)   Resp 16   Wt 69.9 kg (154 lb 1.6 oz)   SpO2 99%   BMI 25.64 kg/m   Estimated body mass index is 25.64 kg/m  as calculated from the following:    Height as of 1/28/25: 1.651 m (5' 5\").    Weight as of this encounter: 69.9 kg (154 lb 1.6 oz). Body surface area is 1.79 meters squared.  No Pain (0) Comment: Data Unavailable   No LMP for male patient.  Allergies reviewed: Yes  Medications reviewed: Yes    Medications: Medication refills not needed today.  Pharmacy name entered into [facility]: [facility] [address] [place], [place] [address] [id number]    Frailty Screening:   Is the patient here for a new oncology consult visit in cancer care? 2. No      Clinical concerns: none      Danelle Oconnell"

## 2025-02-11 NOTE — LETTER
Granulocytes % 1   Absolute Neutrophils 1.6 - 8.3 10e3/uL 4.6   Absolute NRBCs 10e3/uL 0.0   NRBCs per 100 WBC <1 /100 0       Imaging:     MR Brain w/o & w Contrast     Narrative      MR BRAIN W/O & W CONTRAST 1/7/2025 1:02 PM     Provided History: looking for distant mets; Primary lung  adenocarcinoma (H).  ICD-10: Primary lung adenocarcinoma (H)     Comparison: None.     Technique: Multiplanar T1-weighted, axial FLAIR, and susceptibility  images were obtained without intravenous contrast. Following  intravenous gadolinium-based contrast administration, axial  T2-weighted, diffusion, and T1-weighted images (in multiple planes)  were obtained.     Contrast: 7.0mL Gadavist      Findings:  There is no mass effect, midline shift, or evidence of intracranial  hemorrhage. The ventricles are proportionate to the cerebral sulci.  Normal major vascular intracranial flow-voids. Mild generalized  parenchymal volume loss. Scattered T2 hyperintense white matter foci,  most likely represents chronic small vessel ischemic disease.     Postcontrast images demonstrate no abnormal intracranial enhancement.     No abnormality of the skull marrow signal. The visualized portions of  paranasal sinuses, and mastoid air cells are relatively clear. The  orbits are grossly unremarkable.        Impression     Impression: No evidence for intracranial metastatic disease.     VARGHESE LOVE MD         SYSTEM ID:  [id number]   CT Therapy Correlate     Narrative     This exam was marked as non-reportable because it will not be read by a   radiologist or a [place] non-radiologist provider.        Impression/plan:     Clinical stage IIIA NSCLC adenocarcinoma, unresectable  -Initiated definitive chemoradiation on 1/29/25  -Tolerating Carboplatin/Paclitaxel well. Will proceed with C1D15 of treatment today.   - reviewed side effects that are anticipated to progress through treatment. Tolerating well thus far. Continue MENDEZ visits weekly through  treatment     Smoking cessation:   - Akira verbalized actively attempting to stop smoking. Has significantly reduce usage from half/three quarters of a pack per day to 3 cigarettes per day.   -encouraged ongoing cessation    Erythema of R port: -RESOLVED  Completed doxycycline BID x 7 days on 2/4/25.   Cultures have since been NGTD from 1/29.   -no erythema or tenderness on exam    The longitudinal plan of care for the diagnosis(es)/condition(s) as documented were addressed during this visit. Due to the added complexity in care, I will continue to support Akira in the subsequent management and with ongoing continuity of care.      I have seen the patient in conjuction with Ani Nelson CNP. I have reviewed and edited this note to reflect our mutual assessment and plan. TW        Again, thank you for allowing me to participate in the care of your patient.        Sincerely,        YAHIR Betancur CNP    Electronically signed

## 2025-02-11 NOTE — NURSING NOTE
"Chief Complaint   Patient presents with    Port Draw     Labs drawn from port by rn.  VS taken.     Port accessed with 20 gauge 3/4\" Power needle and labs drawn by rn.  Port flushed with NS and heparin.  Pt tolerated well.  VS taken.  Pt checked in for next appt.    Genia Hargrove RN      "

## 2025-02-11 NOTE — PROGRESS NOTES
Reason for Visit: seen in follow-up of lung cancer    Oncology HPI:     Diagnosis: stage IIIA adenocarcinoma of the lung    Akira Plasencia is a 65 yo man who was referred to Dr. Hutchinson for an abnormal CT.  He was treated for a pnuemonia in April 2024.  At that time, he was treated with antibiotics, but he had a repeat CT that showed residual RUL consolidation.  He underwent bronchoscopy on 8/26/24 that showed endobronchial lesion in RUL.  He had a biopsy that was non-diagnostic.   Pet scan on 11/20/24 showed RUL mass with FDG avidity.  There was FDG avid R Upper paratracheal node.   Biopsy was done on 12/13 by CT guidance - this showed lepidic adenocarcinoma, PD-1 <1%, NGS lung panel was negative for any pathologic identified mutation or Fusions.      His case was discussed at tumor board.  He has a large primary lesion.  At the tumor board, it was felt that he was probably resectable and we had discussed doing neoadjuvant chemoimmunotherapy.       He saw Dr. Blanton and while the mass looks like it is potentially resectable, it is very close to large mediastinal blood vessels and it was not clear that it would be resectable.  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  He then met with Dr. Romo to discuss chemoradiation followed by durvalumab.      1/29/25: start of chemo radiation with weekly taxol/carbo    Machelle saw on 2/4, tolerated chemo well. Redness/welling over port lessened. Completing doxycyline BID x 7 on 2/4. No fevers. Blood cx 1/29 negative.     Interval history: ***    Seen in follow-up of lung cancer prior to C1D15 today.     Feeling good after day eight- a little drowsy but otherwise feels at baseline. Able to get out of the house. Eating and drinking at baseline. Did not drink water yet today. No N/V. Stool soft (not watery), more flatus than usual. No blood in  "stool.    Port is itchy. No pain or drainage from site. No fevers. Has completed course of doxycycline.    A little winded w stairs, thinks bc of smoking. Is still smoking but trying to decrease, went from 1/2-3/4 of a pack per day to 3 cigarettes daily.     Otherwise ROS negative. No HA or vsion changes. No chest pain or pressure. No new cough. SOB at rest. No dizziness. No pain or burning w urination, more frequent. No rashes or numbness in hands or feet.     Current Outpatient Medications   Medication Sig Dispense Refill    gabapentin (NEURONTIN) 300 MG capsule Take 1 capsule (300 mg) by mouth at bedtime for 2 days, THEN 1 capsule (300 mg) 2 times daily for 2 days, THEN 1 capsule (300 mg) 3 times daily. 126 capsule 0    ibuprofen (ADVIL/MOTRIN) 400 MG tablet Take 400 mg by mouth every 6 hours as needed for moderate pain. (Patient not taking: Reported on 1/7/2025)      magic mouthwash suspension (diphenhydrAMINE, lidocaine, aluminum-magnesium & simethicone) Swish and swallow 10 mLs in mouth every 6 hours as needed for mouth sores. 300 mL 2    prochlorperazine (COMPAZINE) 10 MG tablet Take 1 tablet (10 mg) by mouth every 6 hours as needed for nausea or vomiting. 30 tablet 2    UNABLE TO FIND Take 2 capsules by mouth daily as needed (\"for leg cramps\"). MEDICATION NAME: \"Cramp Defense (magnesium)\" (Patient not taking: Reported on 1/7/2025)          No Known Allergies      Exam:     Blood pressure 109/72, pulse 109, temperature 97.7  F (36.5  C), temperature source Oral, resp. rate 16, weight 69.9 kg (154 lb 1.6 oz), SpO2 99%.  Wt Readings from Last 4 Encounters:   02/11/25 69.9 kg (154 lb 1.6 oz)   02/06/25 70.4 kg (155 lb 3.2 oz)   02/04/25 69.1 kg (152 lb 6.4 oz)   01/30/25 71.3 kg (157 lb 3.2 oz)     Physical Exam  Vitals reviewed.   Constitutional:       General: He is not in acute distress.  HENT:      Mouth/Throat:      Mouth: Mucous membranes are moist.      Pharynx: Oropharynx is clear.   Eyes:      Pupils: " Pupils are equal, round, and reactive to light.   Cardiovascular:      Rate and Rhythm: Regular rhythm. Tachycardia present.      Pulses: Normal pulses.      Heart sounds: Normal heart sounds. No murmur heard.     No gallop.   Pulmonary:      Effort: Pulmonary effort is normal. No respiratory distress.      Breath sounds: Normal breath sounds. No wheezing.   Abdominal:      General: Abdomen is flat. Bowel sounds are normal. There is no distension.      Palpations: Abdomen is soft.      Tenderness: There is no abdominal tenderness. There is no guarding.   Musculoskeletal:      Right lower leg: No edema.      Left lower leg: No edema.   Skin:     General: Skin is warm and dry.      Comments: Port accessed and site covered with opsite. No palpable warmth/fluctuance, no erythema surrounding port.    Neurological:      Mental Status: He is alert and oriented to person, place, and time.   Psychiatric:         Mood and Affect: Mood normal.       ***    Labs: ***     Latest Reference Range & Units 02/11/25 10:11   Creatinine 0.67 - 1.17 mg/dL 0.73   GFR Estimate >60 mL/min/1.73m2 >90   WBC 4.0 - 11.0 10e3/uL 6.2   Hemoglobin 13.3 - 17.7 g/dL 14.2   Hematocrit 40.0 - 53.0 % 44.4   Platelet Count 150 - 450 10e3/uL 269   RBC Count 4.40 - 5.90 10e6/uL 5.00   MCV 78 - 100 fL 89   MCH 26.5 - 33.0 pg 28.4   MCHC 31.5 - 36.5 g/dL 32.0   RDW 10.0 - 15.0 % 14.0   % Neutrophils % 74   % Lymphocytes % 14   % Monocytes % 10   % Eosinophils % 1   % Basophils % 1   Absolute Basophils 0.0 - 0.2 10e3/uL 0.0   Absolute Eosinophils 0.0 - 0.7 10e3/uL 0.1   Absolute Immature Granulocytes <=0.4 10e3/uL 0.1   Absolute Lymphocytes 0.8 - 5.3 10e3/uL 0.9   Absolute Monocytes 0.0 - 1.3 10e3/uL 0.6   % Immature Granulocytes % 1   Absolute Neutrophils 1.6 - 8.3 10e3/uL 4.6   Absolute NRBCs 10e3/uL 0.0   NRBCs per 100 WBC <1 /100 0       Imaging:     MR Brain w/o & w Contrast     Narrative      MR BRAIN W/O & W CONTRAST 1/7/2025 1:02 PM     Provided  History: looking for distant mets; Primary lung  adenocarcinoma (H).  ICD-10: Primary lung adenocarcinoma (H)     Comparison: None.     Technique: Multiplanar T1-weighted, axial FLAIR, and susceptibility  images were obtained without intravenous contrast. Following  intravenous gadolinium-based contrast administration, axial  T2-weighted, diffusion, and T1-weighted images (in multiple planes)  were obtained.     Contrast: 7.0mL Gadavist      Findings:  There is no mass effect, midline shift, or evidence of intracranial  hemorrhage. The ventricles are proportionate to the cerebral sulci.  Normal major vascular intracranial flow-voids. Mild generalized  parenchymal volume loss. Scattered T2 hyperintense white matter foci,  most likely represents chronic small vessel ischemic disease.     Postcontrast images demonstrate no abnormal intracranial enhancement.     No abnormality of the skull marrow signal. The visualized portions of  paranasal sinuses, and mastoid air cells are relatively clear. The  orbits are grossly unremarkable.        Impression     Impression: No evidence for intracranial metastatic disease.     VARGHESE LOVE MD         SYSTEM ID:  [id number]   CT Therapy Correlate     Narrative     This exam was marked as non-reportable because it will not be read by a   radiologist or a [place] non-radiologist provider.        Impression/plan: ***    Clinical stage IIIA NSCLC adenocarcinoma, unresectable  -Initiated definitive chemoradiation on 1/29/25  -Tolerating Carboplatin/Paclitaxel well. Will proceed with C1D15 of treatment today.   - Vitals consistent with mild dehydration. Kidney function reassuring. Encouraged oral hydration.   - Sees Machelle with infusion on 2/18.     Smoking cessation:   - Akira verbalized actively attempting to stop smoking. Has significantly reduce usage from half/three quarters of a pack per day to 3 cigarettes per day.     Erythema of R port:   Completed doxycycline BID x 7 days on  2/4/25. Continues to have improvement on exam. No erythema/edema/fluctuance on exam today. Cultures have since been NGTD from 1/29.

## 2025-02-11 NOTE — Clinical Note
2/11/2025      Akira Plasencia  [address] [place] [address]      Dear Colleague,    Thank you for referring your patient, Akira Plasencia, to the [facility]. Please see a copy of my visit note below.    Reason for Visit: seen in follow-up of lung cancer    Oncology HPI:     Diagnosis: stage IIIA adenocarcinoma of the lung    Akira Plasencia is a 63 yo man who was referred to Dr. Hutchinson for an abnormal CT.  He was treated for a pnuemonia in April 2024.  At that time, he was treated with antibiotics, but he had a repeat CT that showed residual RUL consolidation.  He underwent bronchoscopy on 8/26/24 that showed endobronchial lesion in RUL.  He had a biopsy that was non-diagnostic.   Pet scan on 11/20/24 showed RUL mass with FDG avidity.  There was FDG avid R Upper paratracheal node.   Biopsy was done on 12/13 by CT guidance - this showed lepidic adenocarcinoma, PD-1 <1%, NGS lung panel was negative for any pathologic identified mutation or Fusions.      His case was discussed at tumor board.  He has a large primary lesion.  At the tumor board, it was felt that he was probably resectable and we had discussed doing neoadjuvant chemoimmunotherapy.       He saw Dr. Blanton and while the mass looks like it is potentially resectable, it is very close to large mediastinal blood vessels and it was not clear that it would be resectable.  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  He then met with Dr. Romo to discuss chemoradiation followed by durvalumab.      1/29/25: start of chemo radiation with weekly taxol/carbo    Machelle saw on 2/4, tolerated chemo well. Redness/welling over port lessened. Completing doxycyline BID x 7 on 2/4. No fevers. Blood cx 1/29 negative.       BP a little lower, .     C1D15 today.     Feeling good - a little drowsy. Able to  "get out of the house. Eating and drinking - hasn't had much water yet today. No dizziness. No HA or vsion changes. No n/v. Stool pretty soft and gassy, 2x in past 24 hrs. No blood. Breathing feels ok.     Port is itchy. No pain or drainage. No fevers. Feels port w swallowing. A little winded w stairs, thinks bc of smoking. No chest pain or pressure.     No pain or burning w urination, more frequent. No rashes or numbness in hands or feet.       Interval history: ***    Current Outpatient Medications   Medication Sig Dispense Refill    gabapentin (NEURONTIN) 300 MG capsule Take 1 capsule (300 mg) by mouth at bedtime for 2 days, THEN 1 capsule (300 mg) 2 times daily for 2 days, THEN 1 capsule (300 mg) 3 times daily. 126 capsule 0    ibuprofen (ADVIL/MOTRIN) 400 MG tablet Take 400 mg by mouth every 6 hours as needed for moderate pain. (Patient not taking: Reported on 1/7/2025)      magic mouthwash suspension (diphenhydrAMINE, lidocaine, aluminum-magnesium & simethicone) Swish and swallow 10 mLs in mouth every 6 hours as needed for mouth sores. 300 mL 2    prochlorperazine (COMPAZINE) 10 MG tablet Take 1 tablet (10 mg) by mouth every 6 hours as needed for nausea or vomiting. 30 tablet 2    UNABLE TO FIND Take 2 capsules by mouth daily as needed (\"for leg cramps\"). MEDICATION NAME: \"Cramp Defense (magnesium)\" (Patient not taking: Reported on 1/7/2025)          No Known Allergies      Exam: *** There were no vitals taken for this visit.  Wt Readings from Last 4 Encounters:   02/06/25 70.4 kg (155 lb 3.2 oz)   02/04/25 69.1 kg (152 lb 6.4 oz)   01/30/25 71.3 kg (157 lb 3.2 oz)   01/29/25 71 kg (156 lb 8.8 oz)     ***    Labs: ***    Imaging: ***    Impression/plan: ***    Clinical stage IIIA NSCLC adenocarcinoma, unresectable  -initiated definitive chemoradiation on 1/29/25  -tolerating ***      R port erythema ***          Again, thank you for allowing me to participate in the care of your patient.  "       Sincerely,        YAHIR Betancur CNP    Electronically signed

## 2025-02-11 NOTE — PATIENT INSTRUCTIONS
[facility] Triage and after hours / weekends / holidays:  [phone]    Please call the triage or after hours line if you experience a temperature greater than or equal to 100.4, shaking chills, have uncontrolled nausea, vomiting and/or diarrhea, dizziness, shortness of breath, chest pain, bleeding, unexplained bruising, or if you have any other new/concerning symptoms, questions or concerns.      If you are having any concerning symptoms or wish to speak to a provider before your next infusion visit, please call triage to notify them so we can adequately serve you.     If you need a refill on a narcotic prescription or other medication, please call before your infusion appointment.                February 2025 Sunday Monday Tuesday Wednesday Thursday Friday Saturday                                 1       2     3    TREATMENT   8:30 AM   (45 min.)   [facility]   [facility] 4    TREATMENT   8:30 AM   (45 min.)   [facility]   [facility]    LAB CENTRAL  12:15 PM   (15 min.)   [facility]   [facility]    RETURN ACTIVE TREATMENT  12:45 PM   (45 min.)   Machelle Cohn CNP   [facility]    ONC INFUSION 2 HR (120 MIN)   2:30 PM   (120 min.)    ONC INFUSION NURSE   [facility] 5    TREATMENT   8:30 AM   (45 min.)   [facility]   [facility] 6    TREATMENT   8:30 AM   (45 min.)   [facility]   [facility]    OTV   9:00 AM   (30 min.)   Marvin Lunsford MD   [facility] 7    TREATMENT   8:30 AM   (45 min.)   [facility]   [facility] 8       9     10    TREATMENT   8:30 AM   (45 min.)   [facility]   [facility] 11    TREATMENT   8:30 AM   (45 min.)   P RAD ONC [facility]HEL[facility]     [facility] Radiation Oncology    LAB CENTRAL   9:45 AM   (15 min.)   [facility]   [facility]    RETURN ACTIVE TREATMENT  10:15 AM   (45 min.)   Raquel Montanez APRN CNP   [facility]    ONC INFUSION 2 HR (120 MIN)  11:30 AM   (120 min.)   [facility] ON[facility] INFUSION NURSE   [facility] 12    TREATMENT   8:30 AM   (45 min.)   [facility]   [facility] 13    TREATMENT   8:30 AM   (45 min.)   [facility]   [facility]    OTV   9:00 AM   (30 min.)   Marvin Lunsford MD   [facility]iation [facility] 14    TREATMENT   8:30 AM   (45 min.)   [facility]   [facility] On[facility] 15       16     17    TREATMENT   8:30 AM   (45 min.)   [facility]   [facility] 18    TREATMENT   8:30 AM   (45 min.)   [facility]   [facility]    LAB CENTRAL  10:30 AM   (15 min.)   [facility]   [facility]    RETURN ACTIVE TREATMENT  10:45 AM   (45 min.)   Machelle Cohn CNP   [facility]    ONC INFUSION 2 HR (120 MIN)   2:30 PM   (120 min.)   [facility] ONC INFUSION NURSE   [facility] 19    TREATMENT   8:30 AM   (45 min.)   [facility]   [facility] 20    TREATMENT   8:30 AM   (45 min.)   [facility]   [facility]    OTV   9:00 AM   (30 min.)   Marvin Lunsford MD   [facility] Radiation Oncology 21    TREATMENT   8:30 AM   (45 min.)   [facility]   [facility] On[facility] 22       23     24    TREATMENT   8:30 AM   (45 min.)   [facility]   [facility]iation On[facility] 25    TREATMENT   8:30 AM   (45 min.)   [facility]   [facility]  Radiation Oncology    LAB CENTRAL  10:00 AM   (15 min.)   [facility]   [facility]    RETURN ACTIVE TREATMENT  10:15 AM   (45 min.)   Raquel Montanez APRN CNP   [facility]    ONC INFUSION 2 HR (120 MIN)  11:30 AM   (120 min.)   [facility] ONC INFUSION NURSE   [facility] 26    TREATMENT   8:30 AM   (45 min.)   [facility]   [facility]iation On[facility] 27    TREATMENT   8:30 AM   (45 min.)   [facility]   [facility]    OTV   9:00 AM   (30 min.)   Marvin Lunsford MD   [facility]iation On[facility] 28    TREATMENT   8:30 AM   (45 min.)   [facility]   [facility]iation [facility]                  March 2025 Sunday Monday Tuesday Wednesday Thursday Friday Saturday                                 1       2     3    TREATMENT   8:30 AM   (45 min.)   [facility]   [facility] 4    TREATMENT   8:30 AM   (45 min.)   [facility]   [facility]    LAB CENTRAL  10:00 AM   (15 min.)   [facility]   [facility]    RETURN ACTIVE TREATMENT  10:15 AM   (45 min.)   Raquel Montanez APRN CNP   [facility]    ONC INFUSION 2 HR (120 MIN)  12:00 PM   (120 min.)   [facility] ONC INFUSION NURSE   [facility] 5    TREATMENT   8:30 AM   (45 min.)   [facility]   [facility] 6    TREATMENT   8:30 AM   (45 min.)   [facility]   [facility]    OTV   9:00 AM   (30 min.)   Marvin Lunsford MD   [facility] Radiation Oncology 7    TREATMENT   8:30 AM   (45 min.)   [facility]   [facility] 8       9     10    TREATMENT   8:30 AM   (45 min.)   [facility]   [facility] Radiation  Oncology 11     12     13     14     15       16     17     18     19     20    LAB CENTRAL   3:15 PM   (15 min.)   [facility]   [facility]    RETURN CCSL   3:45 PM   (30 min.)   Aamir Romo DO   [facility] 21     22       23     24     25     26     27     28     29       30  Happy Birthday!     31                                             Recent Results (from the past 24 hours)   Creatinine    Collection Time: 02/11/25 10:11 AM   Result Value Ref Range    Creatinine 0.73 0.67 - 1.17 mg/dL    GFR Estimate >90 >60 mL/min/1.73m2   CBC with platelets and differential    Collection Time: 02/11/25 10:11 AM   Result Value Ref Range    WBC Count 6.2 4.0 - 11.0 10e3/uL    RBC Count 5.00 4.40 - 5.90 10e6/uL    Hemoglobin 14.2 13.3 - 17.7 g/dL    Hematocrit 44.4 40.0 - 53.0 %    MCV 89 78 - 100 fL    MCH 28.4 26.5 - 33.0 pg    MCHC 32.0 31.5 - 36.5 g/dL    RDW 14.0 10.0 - 15.0 %    Platelet Count 269 150 - 450 10e3/uL    % Neutrophils 74 %    % Lymphocytes 14 %    % Monocytes 10 %    % Eosinophils 1 %    % Basophils 1 %    % Immature Granulocytes 1 %    NRBCs per 100 WBC 0 <1 /100    Absolute Neutrophils 4.6 1.6 - 8.3 10e3/uL    Absolute Lymphocytes 0.9 0.8 - 5.3 10e3/uL    Absolute Monocytes 0.6 0.0 - 1.3 10e3/uL    Absolute Eosinophils 0.1 0.0 - 0.7 10e3/uL    Absolute Basophils 0.0 0.0 - 0.2 10e3/uL    Absolute Immature Granulocytes 0.1 <=0.4 10e3/uL    Absolute NRBCs 0.0 10e3/uL

## 2025-02-11 NOTE — PROGRESS NOTES
Infusion Nursing Note:  Akira Plasencia presents today for Cycle 1 Day 15 paclitaxel, carboplatin.    Patient seen by provider today: Yes: Raquel Montanez CNP   present during visit today: Not Applicable.    Note: Pt presents to infusion today feeling well. Pt offers no new concerns following visit with provider today.      Intravenous Access:  Implanted Port.    Treatment Conditions:  Lab Results   Component Value Date    HGB 14.2 02/11/2025    WBC 6.2 02/11/2025    ANEUTAUTO 4.6 02/11/2025     02/11/2025        Lab Results   Component Value Date     01/28/2025    POTASSIUM 4.1 01/28/2025    CR 0.73 02/11/2025    RAF 9.7 01/28/2025     Results reviewed, labs MET treatment parameters, ok to proceed with treatment.      Post Infusion Assessment:  Patient tolerated infusion without incident.  Blood return noted pre and post infusion.  Site patent and intact, free from redness, edema or discomfort.  No evidence of extravasations.  Access discontinued per protocol.       Discharge Plan:   Patient declined prescription refills.  Discharge instructions reviewed with: Patient.  Patient and/or family verbalized understanding of discharge instructions and all questions answered.  AVS to patient via [facility].  Patient will return 2/18 for next appointment.   Patient discharged in stable condition accompanied by: self.  Departure Mode: Ambulatory.      Olga Castaneda RN

## 2025-02-12 ENCOUNTER — APPOINTMENT (OUTPATIENT)
Dept: RADIATION ONCOLOGY | Facility: CLINIC | Age: 65
End: 2025-02-12
Attending: RADIOLOGY
Payer: MEDICARE

## 2025-02-12 PROCEDURE — 77014 PR CT GUIDE FOR PLACEMENT RADIATION THERAPY FIELDS: CPT | Mod: 26 | Performed by: RADIOLOGY

## 2025-02-12 PROCEDURE — 77386 HC IMRT TREATMENT DELIVERY, COMPLEX: CPT | Performed by: STUDENT IN AN ORGANIZED HEALTH CARE EDUCATION/TRAINING PROGRAM

## 2025-02-13 ENCOUNTER — PATIENT OUTREACH (OUTPATIENT)
Dept: ONCOLOGY | Facility: CLINIC | Age: 65
End: 2025-02-13
Payer: COMMERCIAL

## 2025-02-13 ENCOUNTER — APPOINTMENT (OUTPATIENT)
Dept: RADIATION ONCOLOGY | Facility: CLINIC | Age: 65
End: 2025-02-13
Attending: RADIOLOGY
Payer: MEDICARE

## 2025-02-13 PROCEDURE — 77386 HC IMRT TREATMENT DELIVERY, COMPLEX: CPT | Performed by: STUDENT IN AN ORGANIZED HEALTH CARE EDUCATION/TRAINING PROGRAM

## 2025-02-13 NOTE — PROGRESS NOTES
[facility]: Cancer Care                                                                                          Followed up with Akira to see how he is doing with chemo/radiation.  Reports he is doing well.  No new symptoms or side effects to report.   His stay at [facility] is also going well.  Agreed to call if he has new symptoms to report.     Viktoriya Herman RN

## 2025-02-14 ENCOUNTER — APPOINTMENT (OUTPATIENT)
Dept: RADIATION ONCOLOGY | Facility: CLINIC | Age: 65
End: 2025-02-14
Attending: RADIOLOGY
Payer: MEDICARE

## 2025-02-14 PROCEDURE — 77014 PR CT GUIDE FOR PLACEMENT RADIATION THERAPY FIELDS: CPT | Mod: 26 | Performed by: STUDENT IN AN ORGANIZED HEALTH CARE EDUCATION/TRAINING PROGRAM

## 2025-02-14 PROCEDURE — 77386 HC IMRT TREATMENT DELIVERY, COMPLEX: CPT | Performed by: STUDENT IN AN ORGANIZED HEALTH CARE EDUCATION/TRAINING PROGRAM

## 2025-02-17 ENCOUNTER — APPOINTMENT (OUTPATIENT)
Dept: RADIATION ONCOLOGY | Facility: CLINIC | Age: 65
End: 2025-02-17
Attending: RADIOLOGY
Payer: MEDICARE

## 2025-02-17 PROCEDURE — 77014 PR CT GUIDE FOR PLACEMENT RADIATION THERAPY FIELDS: CPT | Mod: 26 | Performed by: RADIOLOGY

## 2025-02-17 PROCEDURE — 77427 RADIATION TX MANAGEMENT X5: CPT | Mod: GC | Performed by: STUDENT IN AN ORGANIZED HEALTH CARE EDUCATION/TRAINING PROGRAM

## 2025-02-17 PROCEDURE — 77336 RADIATION PHYSICS CONSULT: CPT | Performed by: STUDENT IN AN ORGANIZED HEALTH CARE EDUCATION/TRAINING PROGRAM

## 2025-02-17 PROCEDURE — 77386 HC IMRT TREATMENT DELIVERY, COMPLEX: CPT | Performed by: STUDENT IN AN ORGANIZED HEALTH CARE EDUCATION/TRAINING PROGRAM

## 2025-02-18 ENCOUNTER — INFUSION THERAPY VISIT (OUTPATIENT)
Dept: ONCOLOGY | Facility: CLINIC | Age: 65
End: 2025-02-18
Attending: NURSE PRACTITIONER
Payer: MEDICARE

## 2025-02-18 ENCOUNTER — APPOINTMENT (OUTPATIENT)
Dept: LAB | Facility: CLINIC | Age: 65
End: 2025-02-18
Attending: NURSE PRACTITIONER
Payer: MEDICARE

## 2025-02-18 ENCOUNTER — APPOINTMENT (OUTPATIENT)
Dept: RADIATION ONCOLOGY | Facility: CLINIC | Age: 65
End: 2025-02-18
Attending: RADIOLOGY
Payer: MEDICARE

## 2025-02-18 VITALS
BODY MASS INDEX: 26.46 KG/M2 | RESPIRATION RATE: 16 BRPM | SYSTOLIC BLOOD PRESSURE: 115 MMHG | OXYGEN SATURATION: 99 % | DIASTOLIC BLOOD PRESSURE: 76 MMHG | HEART RATE: 64 BPM | WEIGHT: 159 LBS | TEMPERATURE: 98.2 F

## 2025-02-18 DIAGNOSIS — J44.9 CHRONIC OBSTRUCTIVE PULMONARY DISEASE, UNSPECIFIED COPD TYPE (H): ICD-10-CM

## 2025-02-18 DIAGNOSIS — C34.91 PRIMARY LUNG ADENOCARCINOMA, RIGHT (H): Primary | ICD-10-CM

## 2025-02-18 LAB
BASOPHILS # BLD AUTO: 0 10E3/UL (ref 0–0.2)
BASOPHILS NFR BLD AUTO: 1 %
CREAT SERPL-MCNC: 0.74 MG/DL (ref 0.67–1.17)
EGFRCR SERPLBLD CKD-EPI 2021: >90 ML/MIN/1.73M2
EOSINOPHIL # BLD AUTO: 0 10E3/UL (ref 0–0.7)
EOSINOPHIL NFR BLD AUTO: 1 %
ERYTHROCYTE [DISTWIDTH] IN BLOOD BY AUTOMATED COUNT: 14.1 % (ref 10–15)
HCT VFR BLD AUTO: 40.3 % (ref 40–53)
HGB BLD-MCNC: 13.3 G/DL (ref 13.3–17.7)
IMM GRANULOCYTES # BLD: 0.1 10E3/UL
IMM GRANULOCYTES NFR BLD: 2 %
LYMPHOCYTES # BLD AUTO: 0.5 10E3/UL (ref 0.8–5.3)
LYMPHOCYTES NFR BLD AUTO: 11 %
MCH RBC QN AUTO: 29.1 PG (ref 26.5–33)
MCHC RBC AUTO-ENTMCNC: 33 G/DL (ref 31.5–36.5)
MCV RBC AUTO: 88 FL (ref 78–100)
MONOCYTES # BLD AUTO: 0.7 10E3/UL (ref 0–1.3)
MONOCYTES NFR BLD AUTO: 14 %
NEUTROPHILS # BLD AUTO: 3.5 10E3/UL (ref 1.6–8.3)
NEUTROPHILS NFR BLD AUTO: 71 %
NRBC # BLD AUTO: 0 10E3/UL
NRBC BLD AUTO-RTO: 0 /100
PLATELET # BLD AUTO: 248 10E3/UL (ref 150–450)
RBC # BLD AUTO: 4.57 10E6/UL (ref 4.4–5.9)
WBC # BLD AUTO: 4.9 10E3/UL (ref 4–11)

## 2025-02-18 PROCEDURE — 258N000003 HC RX IP 258 OP 636: Performed by: INTERNAL MEDICINE

## 2025-02-18 PROCEDURE — 96413 CHEMO IV INFUSION 1 HR: CPT

## 2025-02-18 PROCEDURE — G0463 HOSPITAL OUTPT CLINIC VISIT: HCPCS | Mod: 25 | Performed by: NURSE PRACTITIONER

## 2025-02-18 PROCEDURE — 250N000012 HC RX MED GY IP 250 OP 636 PS 637: Performed by: INTERNAL MEDICINE

## 2025-02-18 PROCEDURE — 96375 TX/PRO/DX INJ NEW DRUG ADDON: CPT

## 2025-02-18 PROCEDURE — 85025 COMPLETE CBC W/AUTO DIFF WBC: CPT | Performed by: INTERNAL MEDICINE

## 2025-02-18 PROCEDURE — 99214 OFFICE O/P EST MOD 30 MIN: CPT | Performed by: NURSE PRACTITIONER

## 2025-02-18 PROCEDURE — 77386 HC IMRT TREATMENT DELIVERY, COMPLEX: CPT | Performed by: STUDENT IN AN ORGANIZED HEALTH CARE EDUCATION/TRAINING PROGRAM

## 2025-02-18 PROCEDURE — 250N000011 HC RX IP 250 OP 636: Performed by: INTERNAL MEDICINE

## 2025-02-18 PROCEDURE — 36591 DRAW BLOOD OFF VENOUS DEVICE: CPT | Performed by: INTERNAL MEDICINE

## 2025-02-18 PROCEDURE — G2211 COMPLEX E/M VISIT ADD ON: HCPCS | Performed by: NURSE PRACTITIONER

## 2025-02-18 PROCEDURE — 96417 CHEMO IV INFUS EACH ADDL SEQ: CPT

## 2025-02-18 PROCEDURE — 250N000011 HC RX IP 250 OP 636: Performed by: NURSE PRACTITIONER

## 2025-02-18 PROCEDURE — 82565 ASSAY OF CREATININE: CPT | Performed by: INTERNAL MEDICINE

## 2025-02-18 RX ORDER — HEPARIN SODIUM (PORCINE) LOCK FLUSH IV SOLN 100 UNIT/ML 100 UNIT/ML
5 SOLUTION INTRAVENOUS ONCE
Status: COMPLETED | OUTPATIENT
Start: 2025-02-18 | End: 2025-02-18

## 2025-02-18 RX ORDER — HEPARIN SODIUM,PORCINE 10 UNIT/ML
5-20 VIAL (ML) INTRAVENOUS DAILY PRN
Status: DISCONTINUED | OUTPATIENT
Start: 2025-02-18 | End: 2025-02-18 | Stop reason: HOSPADM

## 2025-02-18 RX ORDER — DEXAMETHASONE 4 MG/1
12 TABLET ORAL ONCE
Status: COMPLETED | OUTPATIENT
Start: 2025-02-18 | End: 2025-02-18

## 2025-02-18 RX ORDER — ONDANSETRON 2 MG/ML
8 INJECTION INTRAMUSCULAR; INTRAVENOUS ONCE
Status: COMPLETED | OUTPATIENT
Start: 2025-02-18 | End: 2025-02-18

## 2025-02-18 RX ORDER — HEPARIN SODIUM (PORCINE) LOCK FLUSH IV SOLN 100 UNIT/ML 100 UNIT/ML
5 SOLUTION INTRAVENOUS
Status: DISCONTINUED | OUTPATIENT
Start: 2025-02-18 | End: 2025-02-18 | Stop reason: HOSPADM

## 2025-02-18 RX ADMIN — DEXAMETHASONE 12 MG: 4 TABLET ORAL at 11:33

## 2025-02-18 RX ADMIN — PACLITAXEL 82 MG: 6 INJECTION, SOLUTION INTRAVENOUS at 11:59

## 2025-02-18 RX ADMIN — CARBOPLATIN 250 MG: 10 INJECTION, SOLUTION INTRAVENOUS at 12:58

## 2025-02-18 RX ADMIN — ONDANSETRON 8 MG: 2 INJECTION INTRAMUSCULAR; INTRAVENOUS at 11:34

## 2025-02-18 RX ADMIN — HEPARIN 5 ML: 100 SYRINGE at 10:08

## 2025-02-18 RX ADMIN — HEPARIN 5 ML: 100 SYRINGE at 13:01

## 2025-02-18 RX ADMIN — SODIUM CHLORIDE 250 ML: 9 INJECTION, SOLUTION INTRAVENOUS at 11:34

## 2025-02-18 ASSESSMENT — PAIN SCALES - GENERAL: PAINLEVEL_OUTOF10: NO PAIN (0)

## 2025-02-18 NOTE — NURSING NOTE
"Oncology Rooming Note    February 18, 2025 10:45 AM   Akira Plasencia is a 64 year old male who presents for:    Chief Complaint   Patient presents with    Port Draw     Labs collected from port by RN. Vitals taken. Checked in for appointment(s).     Oncology Clinic Visit     RTN Primary lung adenocarcinoma      Initial Vitals: /76   Pulse 64   Temp 98.2  F (36.8  C)   Resp 16   Wt 72.1 kg (159 lb)   SpO2 99%   BMI 26.46 kg/m   Estimated body mass index is 26.46 kg/m  as calculated from the following:    Height as of 1/28/25: 1.651 m (5' 5\").    Weight as of this encounter: 72.1 kg (159 lb). Body surface area is 1.82 meters squared.  No Pain (0) Comment: Data Unavailable   No LMP for male patient.  Allergies reviewed: Yes  Medications reviewed: Yes    Medications: Medication refills not needed today.  Pharmacy name entered into [facility]: [facility] [address] [place], [place] [id number] [address]    Frailty Screening:   Is the patient here for a new oncology consult visit in cancer care? 2. No    PHQ9:  Did this patient require a PHQ9?: No      Clinical concerns: none      Danelle Oconnell             "

## 2025-02-18 NOTE — LETTER
2/18/2025      Akira Plasenica  [address] [address]      Dear Colleague,    Thank you for referring your patient, Akira Plasencia, to the [facility]. Please see a copy of my visit note below.    February 18, 2025    Reason for Visit: seen in follow-up of lung cancer    Oncology HPI:     Diagnosis: stage IIIA adenocarcinoma of the lung    Akira Plasencia is a 63 yo man who was referred to Dr. Hutchinson for an abnormal CT.  He was treated for a pnuemonia in April 2024.  At that time, he was treated with antibiotics, but he had a repeat CT that showed residual RUL consolidation.  He underwent bronchoscopy on 8/26/24 that showed endobronchial lesion in RUL.  He had a biopsy that was non-diagnostic.   Pet scan on 11/20/24 showed RUL mass with FDG avidity.  There was FDG avid R Upper paratracheal node.   Biopsy was done on 12/13 by CT guidance - this showed lepidic adenocarcinoma, PD-1 <1%, NGS lung panel was negative for any pathologic identified mutation or Fusions.      His case was discussed at tumor board.  He has a large primary lesion.  At the tumor board, it was felt that he was probably resectable and we had discussed doing neoadjuvant chemoimmunotherapy.       He saw Dr. Blanton and while the mass looks like it is potentially resectable, it is very close to large mediastinal blood vessels and it was not clear that it would be resectable.  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  He then met with Dr. Romo to discuss chemoradiation followed by durvalumab.      1/29/25: start of chemo radiation with weekly taxol/carbo    Machelle saw on 2/4, tolerated chemo well. Redness/welling over port lessened. Completing doxycyline BID x 7 on 2/4. No fevers. Blood cx 1/29 negative.     Interval history:     Seen in follow-up of lung cancer prior to  "C1D22 today.     Doing well. Appetite preserved. No pain with eating/swallowing.     Redness/tenderness of port resolved.     No neuropathy     Stable EDEN. Smokes 3-4 cigarettes daily      Current Outpatient Medications   Medication Sig Dispense Refill     gabapentin (NEURONTIN) 300 MG capsule Take 1 capsule (300 mg) by mouth at bedtime for 2 days, THEN 1 capsule (300 mg) 2 times daily for 2 days, THEN 1 capsule (300 mg) 3 times daily. 126 capsule 0     ibuprofen (ADVIL/MOTRIN) 400 MG tablet Take 400 mg by mouth every 6 hours as needed for moderate pain. (Patient not taking: Reported on 1/7/2025)       magic mouthwash suspension (diphenhydrAMINE, lidocaine, aluminum-magnesium & simethicone) Swish and swallow 10 mLs in mouth every 6 hours as needed for mouth sores. (Patient not taking: Reported on 2/18/2025) 300 mL 2     prochlorperazine (COMPAZINE) 10 MG tablet Take 1 tablet (10 mg) by mouth every 6 hours as needed for nausea or vomiting. (Patient not taking: Reported on 2/18/2025) 30 tablet 2     UNABLE TO FIND Take 2 capsules by mouth daily as needed (\"for leg cramps\"). MEDICATION NAME: \"Cramp Defense (magnesium)\" (Patient not taking: Reported on 1/7/2025)          No Known Allergies      Exam:     Blood pressure 115/76, pulse 64, temperature 98.2  F (36.8  C), resp. rate 16, weight 72.1 kg (159 lb), SpO2 99%.  Wt Readings from Last 4 Encounters:   02/18/25 72.1 kg (159 lb)   02/13/25 72.6 kg (160 lb)   02/11/25 69.9 kg (154 lb 1.6 oz)   02/06/25 70.4 kg (155 lb 3.2 oz)     General: No acute distress  HEENT: Sclera anicteric. Oral exam deferred  Heart: Regular, rate, and rhythm  Lungs: Clear to ascultation bilaterally  Abdomen: Positive bowel sounds. Soft, non-distended, non-tender.   Extremities: no lower extremity edema  Neuro: Cranial nerves grossly intact  Rash: none  Vascular access: port dressed, no visible erythema      Labs:   Most Recent 3 CBC's:  Recent Labs   Lab Test 02/18/25  1006 02/11/25  1011 " 02/04/25  1231   WBC 4.9 6.2 7.6   HGB 13.3 14.2 14.5   MCV 88 89 87    269 302   ANEUTAUTO 3.5 4.6 5.2     Most Recent 3 BMP's:  Recent Labs   Lab Test 02/18/25  1006 02/11/25  1011 02/04/25  1231 01/29/25  1217 01/28/25  1302 11/20/24  1014   NA  --   --   --   --  138  --    POTASSIUM  --   --   --   --  4.1  --    CHLORIDE  --   --   --   --  103  --    CO2  --   --   --   --  27  --    BUN  --   --   --   --  10.4  --    CR 0.74 0.73 1.09   < > 0.79  --    ANIONGAP  --   --   --   --  8  --    RAF  --   --   --   --  9.7  --    GLC  --   --   --   --  92 88    < > = values in this interval not displayed.    Most Recent 3 LFT's:No lab results found. Most Recent 2 TSH and T4:No lab results found.  I reviewed the above labs today.    Imaging:     MR Brain w/o & w Contrast     Narrative      MR BRAIN W/O & W CONTRAST 1/7/2025 1:02 PM     Provided History: looking for distant mets; Primary lung  adenocarcinoma (H).  ICD-10: Primary lung adenocarcinoma (H)     Comparison: None.     Technique: Multiplanar T1-weighted, axial FLAIR, and susceptibility  images were obtained without intravenous contrast. Following  intravenous gadolinium-based contrast administration, axial  T2-weighted, diffusion, and T1-weighted images (in multiple planes)  were obtained.     Contrast: 7.0mL Gadavist      Findings:  There is no mass effect, midline shift, or evidence of intracranial  hemorrhage. The ventricles are proportionate to the cerebral sulci.  Normal major vascular intracranial flow-voids. Mild generalized  parenchymal volume loss. Scattered T2 hyperintense white matter foci,  most likely represents chronic small vessel ischemic disease.     Postcontrast images demonstrate no abnormal intracranial enhancement.     No abnormality of the skull marrow signal. The visualized portions of  paranasal sinuses, and mastoid air cells are relatively clear. The  orbits are grossly unremarkable.        Impression     Impression: No  evidence for intracranial metastatic disease.     VARGHESE LOVE MD         SYSTEM ID:  [id number]   CT Therapy Correlate     Narrative     This exam was marked as non-reportable because it will not be read by a   radiologist or a [place] non-radiologist provider.        Impression/plan:     Clinical stage IIIA NSCLC adenocarcinoma, unresectable  -Initiated definitive chemoradiation on 1/29/25  -Tolerating Carboplatin/Paclitaxel well. Will proceed with C1D22 of treatment today.   - reviewed side effects that are anticipated to progress through treatment. Tolerating well thus far. Continue MENDEZ visits weekly through treatment     Smoking cessation:   - Has significantly reduce usage from half/three quarters of a pack per day to 3 cigarettes per day.   -encouraged ongoing cessation    Erythema of R port:   -resolved with course of doxycycline    The longitudinal plan of care for the diagnosis(es)/condition(s) as documented were addressed during this visit. Due to the added complexity in care, I will continue to support Akira in the subsequent management and with ongoing continuity of care.    30 minutes spent on the date of the encounter doing chart review, review of test results, interpretation of tests, patient visit, and documentation       Machelle Cohn CNP on 2/18/2025 at 11:08 AM          Again, thank you for allowing me to participate in the care of your patient.        Sincerely,        Machelle Cohn CNP    Electronically signed

## 2025-02-18 NOTE — PROGRESS NOTES
February 18, 2025    Reason for Visit: seen in follow-up of lung cancer    Oncology HPI:     Diagnosis: stage IIIA adenocarcinoma of the lung    Akira Plasencia is a 65 yo man who was referred to Dr. Hutchinson for an abnormal CT.  He was treated for a pnuemonia in April 2024.  At that time, he was treated with antibiotics, but he had a repeat CT that showed residual RUL consolidation.  He underwent bronchoscopy on 8/26/24 that showed endobronchial lesion in RUL.  He had a biopsy that was non-diagnostic.   Pet scan on 11/20/24 showed RUL mass with FDG avidity.  There was FDG avid R Upper paratracheal node.   Biopsy was done on 12/13 by CT guidance - this showed lepidic adenocarcinoma, PD-1 <1%, NGS lung panel was negative for any pathologic identified mutation or Fusions.      His case was discussed at tumor board.  He has a large primary lesion.  At the tumor board, it was felt that he was probably resectable and we had discussed doing neoadjuvant chemoimmunotherapy.       He saw Dr. Blanton and while the mass looks like it is potentially resectable, it is very close to large mediastinal blood vessels and it was not clear that it would be resectable.  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  He then met with Dr. Romo to discuss chemoradiation followed by durvalumab.      1/29/25: start of chemo radiation with weekly taxol/carbo    Machelle saw on 2/4, tolerated chemo well. Redness/welling over port lessened. Completing doxycyline BID x 7 on 2/4. No fevers. Blood cx 1/29 negative.     Interval history:     Seen in follow-up of lung cancer prior to C1D22 today.     Doing well. Appetite preserved. No pain with eating/swallowing.     Redness/tenderness of port resolved.     No neuropathy     Stable EDEN. Smokes 3-4 cigarettes daily      Current Outpatient Medications   Medication Sig Dispense  "Refill    gabapentin (NEURONTIN) 300 MG capsule Take 1 capsule (300 mg) by mouth at bedtime for 2 days, THEN 1 capsule (300 mg) 2 times daily for 2 days, THEN 1 capsule (300 mg) 3 times daily. 126 capsule 0    ibuprofen (ADVIL/MOTRIN) 400 MG tablet Take 400 mg by mouth every 6 hours as needed for moderate pain. (Patient not taking: Reported on 1/7/2025)      magic mouthwash suspension (diphenhydrAMINE, lidocaine, aluminum-magnesium & simethicone) Swish and swallow 10 mLs in mouth every 6 hours as needed for mouth sores. (Patient not taking: Reported on 2/18/2025) 300 mL 2    prochlorperazine (COMPAZINE) 10 MG tablet Take 1 tablet (10 mg) by mouth every 6 hours as needed for nausea or vomiting. (Patient not taking: Reported on 2/18/2025) 30 tablet 2    UNABLE TO FIND Take 2 capsules by mouth daily as needed (\"for leg cramps\"). MEDICATION NAME: \"Cramp Defense (magnesium)\" (Patient not taking: Reported on 1/7/2025)          No Known Allergies      Exam:     Blood pressure 115/76, pulse 64, temperature 98.2  F (36.8  C), resp. rate 16, weight 72.1 kg (159 lb), SpO2 99%.  Wt Readings from Last 4 Encounters:   02/18/25 72.1 kg (159 lb)   02/13/25 72.6 kg (160 lb)   02/11/25 69.9 kg (154 lb 1.6 oz)   02/06/25 70.4 kg (155 lb 3.2 oz)     General: No acute distress  HEENT: Sclera anicteric. Oral exam deferred  Heart: Regular, rate, and rhythm  Lungs: Clear to ascultation bilaterally  Abdomen: Positive bowel sounds. Soft, non-distended, non-tender.   Extremities: no lower extremity edema  Neuro: Cranial nerves grossly intact  Rash: none  Vascular access: port dressed, no visible erythema      Labs:   Most Recent 3 CBC's:  Recent Labs   Lab Test 02/18/25  1006 02/11/25  1011 02/04/25  1231   WBC 4.9 6.2 7.6   HGB 13.3 14.2 14.5   MCV 88 89 87    269 302   ANEUTAUTO 3.5 4.6 5.2     Most Recent 3 BMP's:  Recent Labs   Lab Test 02/18/25  1006 02/11/25  1011 02/04/25  1231 01/29/25  1217 01/28/25  1302 11/20/24  1014   NA  " --   --   --   --  138  --    POTASSIUM  --   --   --   --  4.1  --    CHLORIDE  --   --   --   --  103  --    CO2  --   --   --   --  27  --    BUN  --   --   --   --  10.4  --    CR 0.74 0.73 1.09   < > 0.79  --    ANIONGAP  --   --   --   --  8  --    RAF  --   --   --   --  9.7  --    GLC  --   --   --   --  92 88    < > = values in this interval not displayed.    Most Recent 3 LFT's:No lab results found. Most Recent 2 TSH and T4:No lab results found.  I reviewed the above labs today.    Imaging:     MR Brain w/o & w Contrast     Narrative      MR BRAIN W/O & W CONTRAST 1/7/2025 1:02 PM     Provided History: looking for distant mets; Primary lung  adenocarcinoma (H).  ICD-10: Primary lung adenocarcinoma (H)     Comparison: None.     Technique: Multiplanar T1-weighted, axial FLAIR, and susceptibility  images were obtained without intravenous contrast. Following  intravenous gadolinium-based contrast administration, axial  T2-weighted, diffusion, and T1-weighted images (in multiple planes)  were obtained.     Contrast: 7.0mL Gadavist      Findings:  There is no mass effect, midline shift, or evidence of intracranial  hemorrhage. The ventricles are proportionate to the cerebral sulci.  Normal major vascular intracranial flow-voids. Mild generalized  parenchymal volume loss. Scattered T2 hyperintense white matter foci,  most likely represents chronic small vessel ischemic disease.     Postcontrast images demonstrate no abnormal intracranial enhancement.     No abnormality of the skull marrow signal. The visualized portions of  paranasal sinuses, and mastoid air cells are relatively clear. The  orbits are grossly unremarkable.        Impression     Impression: No evidence for intracranial metastatic disease.     VARGHESE LOVE MD         SYSTEM ID:  [id number]   CT Therapy Correlate     Narrative     This exam was marked as non-reportable because it will not be read by a   radiologist or a [place] non-radiologist  provider.        Impression/plan:     Clinical stage IIIA NSCLC adenocarcinoma, unresectable  -Initiated definitive chemoradiation on 1/29/25  -Tolerating Carboplatin/Paclitaxel well. Will proceed with C1D22 of treatment today.   - reviewed side effects that are anticipated to progress through treatment. Tolerating well thus far. Continue MENDEZ visits weekly through treatment     Smoking cessation:   - Has significantly reduce usage from half/three quarters of a pack per day to 3 cigarettes per day.   -encouraged ongoing cessation    Erythema of R port:   -resolved with course of doxycycline    The longitudinal plan of care for the diagnosis(es)/condition(s) as documented were addressed during this visit. Due to the added complexity in care, I will continue to support Akira in the subsequent management and with ongoing continuity of care.    30 minutes spent on the date of the encounter doing chart review, review of test results, interpretation of tests, patient visit, and documentation       Machelle Cohn CNP on 2/18/2025 at 11:08 AM

## 2025-02-18 NOTE — PROGRESS NOTES
Infusion Nursing Note:  Akira Plasencia presents today for C1D22 Taxol-Carboplatin.    Patient seen by provider today: Yes: Machelle Cohn, TAM    present during visit today: Not Applicable.    Note: Pt saw provider prior to infusion.  Akira agrees to treatment today.    Intravenous Access:  Implanted Port.    Treatment Conditions:   Latest Reference Range & Units 02/18/25 10:06   Creatinine 0.67 - 1.17 mg/dL 0.74   GFR Estimate >60 mL/min/1.73m2 >90   WBC 4.0 - 11.0 10e3/uL 4.9   Hemoglobin 13.3 - 17.7 g/dL 13.3   Hematocrit 40.0 - 53.0 % 40.3   Platelet Count 150 - 450 10e3/uL 248   RBC Count 4.40 - 5.90 10e6/uL 4.57   MCV 78 - 100 fL 88   MCH 26.5 - 33.0 pg 29.1   MCHC 31.5 - 36.5 g/dL 33.0   RDW 10.0 - 15.0 % 14.1   % Neutrophils % 71   % Lymphocytes % 11   % Monocytes % 14   % Eosinophils % 1   % Basophils % 1   Absolute Basophils 0.0 - 0.2 10e3/uL 0.0   Absolute Eosinophils 0.0 - 0.7 10e3/uL 0.0   Absolute Immature Granulocytes <=0.4 10e3/uL 0.1   Absolute Lymphocytes 0.8 - 5.3 10e3/uL 0.5 (L)   Absolute Monocytes 0.0 - 1.3 10e3/uL 0.7   % Immature Granulocytes % 2   Absolute Neutrophils 1.6 - 8.3 10e3/uL 3.5   Absolute NRBCs 10e3/uL 0.0   NRBCs per 100 WBC <1 /100 0       Post Infusion Assessment:  Patient tolerated infusion without incident.  Blood return noted pre and post infusion.  Site patent and intact, free from redness, edema or discomfort.  No evidence of extravasations.  Access discontinued per protocol.       Discharge Plan:   Patient declined prescription refills.  Discharge instructions reviewed with: Patient.  Patient and/or family verbalized understanding of discharge instructions and all questions answered.  AVS to patient via [employer].  Patient will return 2/25 for next appointment.   Patient discharged in stable condition accompanied by: self.  Departure Mode: Ambulatory.    Brandy Mar RN

## 2025-02-18 NOTE — NURSING NOTE
Chief Complaint   Patient presents with    Port Draw     Labs collected from port by RN. Vitals taken. Checked in for appointment(s).      Port accessed with 20 gauge 3/4 inch flat needle by RN, labs collected, line flushed with saline and heparin.  Vitals taken. Pt checked in for appointment(s).     Vilma Flores RN

## 2025-02-19 ENCOUNTER — APPOINTMENT (OUTPATIENT)
Dept: RADIATION ONCOLOGY | Facility: CLINIC | Age: 65
End: 2025-02-19
Attending: RADIOLOGY
Payer: MEDICARE

## 2025-02-19 PROCEDURE — 77014 PR CT GUIDE FOR PLACEMENT RADIATION THERAPY FIELDS: CPT | Mod: 26 | Performed by: STUDENT IN AN ORGANIZED HEALTH CARE EDUCATION/TRAINING PROGRAM

## 2025-02-19 PROCEDURE — 77386 HC IMRT TREATMENT DELIVERY, COMPLEX: CPT | Performed by: STUDENT IN AN ORGANIZED HEALTH CARE EDUCATION/TRAINING PROGRAM

## 2025-02-20 ENCOUNTER — OFFICE VISIT (OUTPATIENT)
Dept: RADIATION ONCOLOGY | Facility: CLINIC | Age: 65
End: 2025-02-20
Attending: STUDENT IN AN ORGANIZED HEALTH CARE EDUCATION/TRAINING PROGRAM
Payer: MEDICARE

## 2025-02-20 ENCOUNTER — APPOINTMENT (OUTPATIENT)
Dept: RADIATION ONCOLOGY | Facility: CLINIC | Age: 65
End: 2025-02-20
Attending: RADIOLOGY
Payer: MEDICARE

## 2025-02-20 VITALS
SYSTOLIC BLOOD PRESSURE: 114 MMHG | HEART RATE: 76 BPM | DIASTOLIC BLOOD PRESSURE: 70 MMHG | WEIGHT: 159.5 LBS | BODY MASS INDEX: 26.54 KG/M2

## 2025-02-20 DIAGNOSIS — C34.91 PRIMARY LUNG ADENOCARCINOMA, RIGHT (H): Primary | ICD-10-CM

## 2025-02-20 PROCEDURE — 77386 HC IMRT TREATMENT DELIVERY, COMPLEX: CPT | Performed by: STUDENT IN AN ORGANIZED HEALTH CARE EDUCATION/TRAINING PROGRAM

## 2025-02-20 NOTE — PROGRESS NOTES
Radiation Oncology On Treatment Visit Note    Akira Plasencia      Date: 2025   MRN: [id number]      : 1960  Diagnosis: Lung Cancer    Treatment Summary to Date  Treatment Site: RUL Current Dose: 3600/6000 cGy Fractions:       Chemotherapy  Chemo concurrent with radx?: Yes  Oncologist: Dr Romo  Drug Name/Frequency 1: Taxol/Carboplatin    ED Visit/Hospital Admissions: None    Treatment Breaks: None    Subjective: On interview, the patient reports that he is doing well overall.  The patient continues to have a dry cough.  The patient also reports very mild dysphagia, needing to take small bites when he is eating.  The patient still able to maintain regular p.o. intake and with a normal diet.  The patient denies any odynophagia.  Patient reports mild fatigue, requiring a nap, as well as mild dermatitis over his treatment field.  The patient also confirmed that he has been taking his gabapentin as prescribed 3 times a day.    Nursing ROS:   Nutrition Alteration  Diet Type: Patient's Preference  Skin  Skin Reaction: 0 - No changes      ENT and Mouth Exam  Mucositis - Current: 0 - None      Gastrointestinal  Nausea: 0 - None     Psychosocial  Mood - Anxiety: 0 - Normal  Pain Assessment  0-10 Pain Scale: 0    Objective:   /70   Pulse 76   Wt 72.3 kg (159 lb 8 oz)   BMI 26.54 kg/m    Gen: Appears well, NAD  Skin: Mild diffuse erythema over treatment field    Assessment:    Tolerating radiation therapy well.  All questions and concerns addressed.    Toxicities:  Grade 1 cough  Grade 1 dysphagia  Grade 1 fatigue  Grade 1 dermatitis    Plan:   Continue current therapy    Mosaiq chart and setup information reviewed. Imaging reviewed.     Patient was seen and discussed with my attending physician, Dr. Lunsford.    Yann Sanchez MD, MS PGY-4  Radiation Oncology  Department of Radiation Oncology  [facility]  Phone: [phone]

## 2025-02-20 NOTE — LETTER
2025      Akira Plasencia  [address] [place] [address]      Dear Colleague,    Thank you for referring your patient, Akira Plasencia, to the [facility] RAD[facility]OGY. Please see a copy of my visit note below.    Radiation Oncology On Treatment Visit Note    Akira Plasencia      Date: 2025   MRN: [id number]      : 1960  Diagnosis: Lung Cancer    Treatment Summary to Date  Treatment Site: RU Current Dose: 3600/6000 cGy Fractions:       Chemotherapy  Chemo concurrent with radx?: Yes  Oncologist: Dr Romo  Drug Name/Frequency 1: Taxol/Carboplatin    ED Visit/Hospital Admissions: None    Treatment Breaks: None    Subjective: On interview, the patient reports that he is doing well overall.  The patient continues to have a dry cough.  The patient also reports very mild dysphagia, needing to take small bites when he is eating.  The patient still able to maintain regular p.o. intake and with a normal diet.  The patient denies any odynophagia.  Patient reports mild fatigue, requiring a nap, as well as mild dermatitis over his treatment field.  The patient also confirmed that he has been taking his gabapentin as prescribed 3 times a day.    Nursing ROS:   Nutrition Alteration  Diet Type: Patient's Preference  Skin  Skin Reaction: 0 - No changes      ENT and Mouth Exam  Mucositis - Current: 0 - None      Gastrointestinal  Nausea: 0 - None     Psychosocial  Mood - Anxiety: 0 - Normal  Pain Assessment  0-10 Pain Scale: 0    Objective:   /70   Pulse 76   Wt 72.3 kg (159 lb 8 oz)   BMI 26.54 kg/m    Gen: Appears well, NAD  Skin: Mild diffuse erythema over treatment field    Assessment:    Tolerating radiation therapy well.  All questions and concerns addressed.    Toxicities:  Grade 1 cough  Grade 1 dysphagia  Grade 1 fatigue  Grade 1 dermatitis    Plan:   Continue current therapy    Mosaiq chart and setup information reviewed. Imaging reviewed.     Patient was seen and  discussed with my attending physician, Dr. Lunsfodr.    Yann Sanchez MD, MS PGY-4  Radiation Oncology  Department of Radiation Oncology  [facility]  Phone: [phone]        Attestation signed by Marvin Lunsford MD at 2/24/2025  7:42 AM:  Physician Attestation:  I saw the patient in conjunction with the resident physician, Dr. Sanchez, and the note above reflects my opinion and examination. Imaging and pathology were reviewed as above. I spent 30 minutes  with the patient, including face-to-face time and same day care coordination and pathology/imaging review.    Marvin Lunsford MD    Associate Residency   Department of Radiation Oncology  [facility]       Again, thank you for allowing me to participate in the care of your patient.        Sincerely,        Marvin Lunsford MD    Electronically signed

## 2025-02-21 ENCOUNTER — APPOINTMENT (OUTPATIENT)
Dept: RADIATION ONCOLOGY | Facility: CLINIC | Age: 65
End: 2025-02-21
Attending: RADIOLOGY
Payer: MEDICARE

## 2025-02-21 PROCEDURE — 77386 HC IMRT TREATMENT DELIVERY, COMPLEX: CPT | Performed by: STUDENT IN AN ORGANIZED HEALTH CARE EDUCATION/TRAINING PROGRAM

## 2025-02-21 PROCEDURE — 77014 PR CT GUIDE FOR PLACEMENT RADIATION THERAPY FIELDS: CPT | Mod: 26 | Performed by: RADIOLOGY

## 2025-02-24 ENCOUNTER — APPOINTMENT (OUTPATIENT)
Dept: RADIATION ONCOLOGY | Facility: CLINIC | Age: 65
End: 2025-02-24
Attending: RADIOLOGY
Payer: MEDICARE

## 2025-02-24 PROCEDURE — 77336 RADIATION PHYSICS CONSULT: CPT | Performed by: STUDENT IN AN ORGANIZED HEALTH CARE EDUCATION/TRAINING PROGRAM

## 2025-02-24 PROCEDURE — 77427 RADIATION TX MANAGEMENT X5: CPT | Mod: GC | Performed by: STUDENT IN AN ORGANIZED HEALTH CARE EDUCATION/TRAINING PROGRAM

## 2025-02-24 PROCEDURE — 77014 PR CT GUIDE FOR PLACEMENT RADIATION THERAPY FIELDS: CPT | Mod: 26 | Performed by: STUDENT IN AN ORGANIZED HEALTH CARE EDUCATION/TRAINING PROGRAM

## 2025-02-24 PROCEDURE — 77386 HC IMRT TREATMENT DELIVERY, COMPLEX: CPT | Performed by: STUDENT IN AN ORGANIZED HEALTH CARE EDUCATION/TRAINING PROGRAM

## 2025-02-25 ENCOUNTER — APPOINTMENT (OUTPATIENT)
Dept: RADIATION ONCOLOGY | Facility: CLINIC | Age: 65
End: 2025-02-25
Attending: RADIOLOGY
Payer: MEDICARE

## 2025-02-25 ENCOUNTER — APPOINTMENT (OUTPATIENT)
Dept: LAB | Facility: CLINIC | Age: 65
End: 2025-02-25
Attending: NURSE PRACTITIONER
Payer: MEDICARE

## 2025-02-25 ENCOUNTER — ONCOLOGY VISIT (OUTPATIENT)
Dept: ONCOLOGY | Facility: CLINIC | Age: 65
End: 2025-02-25
Attending: NURSE PRACTITIONER
Payer: MEDICARE

## 2025-02-25 VITALS
TEMPERATURE: 97.6 F | BODY MASS INDEX: 26.28 KG/M2 | RESPIRATION RATE: 16 BRPM | HEART RATE: 93 BPM | SYSTOLIC BLOOD PRESSURE: 111 MMHG | OXYGEN SATURATION: 98 % | DIASTOLIC BLOOD PRESSURE: 72 MMHG | WEIGHT: 157.9 LBS

## 2025-02-25 DIAGNOSIS — C34.91 PRIMARY LUNG ADENOCARCINOMA, RIGHT (H): Primary | ICD-10-CM

## 2025-02-25 LAB
BASOPHILS # BLD AUTO: 0 10E3/UL (ref 0–0.2)
BASOPHILS NFR BLD AUTO: 0 %
CREAT SERPL-MCNC: 0.73 MG/DL (ref 0.67–1.17)
EGFRCR SERPLBLD CKD-EPI 2021: >90 ML/MIN/1.73M2
EOSINOPHIL # BLD AUTO: 0 10E3/UL (ref 0–0.7)
EOSINOPHIL NFR BLD AUTO: 1 %
ERYTHROCYTE [DISTWIDTH] IN BLOOD BY AUTOMATED COUNT: 14.2 % (ref 10–15)
HCT VFR BLD AUTO: 35.6 % (ref 40–53)
HGB BLD-MCNC: 11.5 G/DL (ref 13.3–17.7)
IMM GRANULOCYTES # BLD: 0.1 10E3/UL
IMM GRANULOCYTES NFR BLD: 1 %
LYMPHOCYTES # BLD AUTO: 0.4 10E3/UL (ref 0.8–5.3)
LYMPHOCYTES NFR BLD AUTO: 7 %
MCH RBC QN AUTO: 29.1 PG (ref 26.5–33)
MCHC RBC AUTO-ENTMCNC: 32.3 G/DL (ref 31.5–36.5)
MCV RBC AUTO: 90 FL (ref 78–100)
MONOCYTES # BLD AUTO: 0.7 10E3/UL (ref 0–1.3)
MONOCYTES NFR BLD AUTO: 13 %
NEUTROPHILS # BLD AUTO: 4.3 10E3/UL (ref 1.6–8.3)
NEUTROPHILS NFR BLD AUTO: 78 %
NRBC # BLD AUTO: 0 10E3/UL
NRBC BLD AUTO-RTO: 0 /100
PLATELET # BLD AUTO: 158 10E3/UL (ref 150–450)
RBC # BLD AUTO: 3.95 10E6/UL (ref 4.4–5.9)
WBC # BLD AUTO: 5.4 10E3/UL (ref 4–11)

## 2025-02-25 PROCEDURE — 85041 AUTOMATED RBC COUNT: CPT | Performed by: INTERNAL MEDICINE

## 2025-02-25 PROCEDURE — 99214 OFFICE O/P EST MOD 30 MIN: CPT | Performed by: NURSE PRACTITIONER

## 2025-02-25 PROCEDURE — 258N000003 HC RX IP 258 OP 636: Performed by: INTERNAL MEDICINE

## 2025-02-25 PROCEDURE — 250N000011 HC RX IP 250 OP 636: Performed by: INTERNAL MEDICINE

## 2025-02-25 PROCEDURE — 96413 CHEMO IV INFUSION 1 HR: CPT

## 2025-02-25 PROCEDURE — G2211 COMPLEX E/M VISIT ADD ON: HCPCS | Performed by: NURSE PRACTITIONER

## 2025-02-25 PROCEDURE — 250N000011 HC RX IP 250 OP 636: Performed by: NURSE PRACTITIONER

## 2025-02-25 PROCEDURE — 96375 TX/PRO/DX INJ NEW DRUG ADDON: CPT

## 2025-02-25 PROCEDURE — 96417 CHEMO IV INFUS EACH ADDL SEQ: CPT

## 2025-02-25 PROCEDURE — G0463 HOSPITAL OUTPT CLINIC VISIT: HCPCS | Performed by: NURSE PRACTITIONER

## 2025-02-25 PROCEDURE — 77386 HC IMRT TREATMENT DELIVERY, COMPLEX: CPT | Performed by: STUDENT IN AN ORGANIZED HEALTH CARE EDUCATION/TRAINING PROGRAM

## 2025-02-25 PROCEDURE — 82565 ASSAY OF CREATININE: CPT | Performed by: INTERNAL MEDICINE

## 2025-02-25 PROCEDURE — 85004 AUTOMATED DIFF WBC COUNT: CPT | Performed by: INTERNAL MEDICINE

## 2025-02-25 PROCEDURE — 36591 DRAW BLOOD OFF VENOUS DEVICE: CPT | Performed by: INTERNAL MEDICINE

## 2025-02-25 RX ORDER — HEPARIN SODIUM (PORCINE) LOCK FLUSH IV SOLN 100 UNIT/ML 100 UNIT/ML
500 SOLUTION INTRAVENOUS ONCE
Status: COMPLETED | OUTPATIENT
Start: 2025-02-25 | End: 2025-02-25

## 2025-02-25 RX ORDER — HEPARIN SODIUM (PORCINE) LOCK FLUSH IV SOLN 100 UNIT/ML 100 UNIT/ML
5 SOLUTION INTRAVENOUS
Status: DISCONTINUED | OUTPATIENT
Start: 2025-02-25 | End: 2025-02-25 | Stop reason: HOSPADM

## 2025-02-25 RX ADMIN — CARBOPLATIN 250 MG: 10 INJECTION, SOLUTION INTRAVENOUS at 12:54

## 2025-02-25 RX ADMIN — DEXAMETHASONE SODIUM PHOSPHATE: 10 INJECTION, SOLUTION INTRAMUSCULAR; INTRAVENOUS at 11:26

## 2025-02-25 RX ADMIN — SODIUM CHLORIDE 250 ML: 9 INJECTION, SOLUTION INTRAVENOUS at 11:26

## 2025-02-25 RX ADMIN — HEPARIN 5 ML: 100 SYRINGE at 13:29

## 2025-02-25 RX ADMIN — HEPARIN 500 UNITS: 100 SYRINGE at 10:07

## 2025-02-25 RX ADMIN — PACLITAXEL 82 MG: 6 INJECTION, SOLUTION INTRAVENOUS at 11:49

## 2025-02-25 ASSESSMENT — PAIN SCALES - GENERAL: PAINLEVEL_OUTOF10: NO PAIN (0)

## 2025-02-25 NOTE — PATIENT INSTRUCTIONS
February 2025 Sunday Monday Tuesday Wednesday Thursday Friday Saturday                                 1       2     3    TREATMENT   8:30 AM   (45 min.)   [facility] ONC CHELSI   [facility] Radiation Oncology 4    TREATMENT   8:30 AM   (45 min.)   [facility] ONC [facility]   [facility] Radiation Oncology    LAB CENTRAL  12:15 PM   (15 min.)   [facility] DRAW   [facility]    RETURN ACTIVE TREATMENT  12:45 PM   (45 min.)   Machelle Cohn CNP   [facility]    ONC INFUSION 2 HR (120 MIN)   2:30 PM   (120 min.)   [facility] ON[facility] INFUSION NURSE   [facility] 5    TREATMENT   8:30 AM   (45 min.)   [facility] ONC [facility]   [facility] Radiation Oncology 6    TREATMENT   8:30 AM   (45 min.)   [facility] ONC [facility]   [facility] Radiation Oncology    OTV   9:00 AM   (30 min.)   Marvin Lunsford MD   [facility] Radiation Oncology 7    TREATMENT   8:30 AM   (45 min.)   [facility] ONC CHELSI   [facility] Radiation Oncology 8       9     10    TREATMENT   8:30 AM   (45 min.)   [facility] ONC [facility]HELSI   [facility] Radiation Oncology 11    TREATMENT   8:30 AM   (45 min.)   [facility] ONC [facility]HELSI   [facility] Radiation Oncology    LAB CENTRAL   9:45 AM   (15 min.)   [facility]RAW   [facility]    RETURN ACTIVE TREATMENT  10:15 AM   (45 min.)   Raquel Montanez APRN CNP   [facility]    ONC INFUSION 2 HR (120 MIN)  11:30 AM   (120 min.)   [facility] ONC INFUSION NURSE   [facility] 12    TREATMENT   8:30 AM   (45 min.)   [facility] ONC CHELSI   [facility] Radiation Oncology 13    TREATMENT   8:30 AM   (45 min.)   [facility] ONC [facility]HEL[facility]   [facility] Radiation Oncology    OTV   9:00 AM   (30 min.)   Marvin Lunsford MD   [facility] Radiation Oncology 14    TREATMENT   8:30  AM   (45 min.)   [facility] RAD ONC CHELSI   [facility] Radiation Oncology 15       16     17    TREATMENT   8:30 AM   (45 min.)   [facility] ONC [facility]   [facility] Radiation Oncology 18    TREATMENT   8:30 AM   (45 min.)   [facility] ONC [facility]HEL[facility]   [facility] Radiation Oncology    LAB CENTRAL  10:30 AM   (15 min.)   [facility] DRAW   [facility]    RETURN ACTIVE TREATMENT  10:45 AM   (45 min.)   Machelle Cohn CNP   [facility]    ONC INFUSION 2 HR (120 MIN)   2:30 PM   (120 min.)   [facility] ONC INFUSION NURSE   [facility] 19    TREATMENT   8:30 AM   (45 min.)   [facility] ONC CHELSI   [facility] Radiation Oncology 20    TREATMENT   8:30 AM   (45 min.)   [facility] ONC CHELSI   [facility] Radiation Oncology    OTV   9:00 AM   (30 min.)   Marvin Lunsford MD   [facility] Radiation Oncology 21    TREATMENT   8:30 AM   (45 min.)   [facility] ONC CHELSI   [facility] Radiation Oncology 22       23     24    TREATMENT   8:30 AM   (45 min.)   [facility] ONC [facility]HEL[facility]   [facility] Radiation Oncology 25    TREATMENT   8:30 AM   (45 min.)   [facility] ONC [facility]HELSI   [facility] Radiation Oncology    LAB CENTRAL  10:00 AM   (15 min.)   [facility] DRAW   [facility]    RETURN ACTIVE TREATMENT  10:15 AM   (45 min.)   Raquel Montanez APRN CNP   [facility]    ONC INFUSION 2 HR (120 MIN)  11:30 AM   (120 min.)   [facility] ONC INFUSION NURSE   [facility] 26    TREATMENT   8:30 AM   (45 min.)   [facility] RAD ONC CHELSI   [facility] Radiation Oncology 27    TREATMENT   8:30 AM   (45 min.)   [facility] RAD ONC CHELSI   [facility] Radiation Oncology    OTV   9:00 AM   (30 min.)   Marvin Lunsford MD   [facility] Radiation Oncology 28    TREATMENT   8:30 AM   (45 min.)   [facility]  ONC CHELSI   [facility] Radiation Oncology                  March 2025 Sunday Monday Tuesday Wednesday Thursday Friday Saturday                                 1       2     3    TREATMENT   8:30 AM   (45 min.)   [facility] ONC CHELSI   [facility] Radiation Oncology 4    TREATMENT   8:30 AM   (45 min.)   [facility] ONC [facility]HELSI   [facility] Radiation Oncology    LAB CENTRAL  10:15 AM   (15 min.)    [facility]   [facility]    RETURN ACTIVE TREATMENT  10:45 AM   (45 min.)   Machelle Cohn CNP   [facility]    ONC INFUSION 2 HR (120 MIN)  12:00 PM   (120 min.)    ONC INFUSION NURSE   [facility] 5    TREATMENT   8:30 AM   (45 min.)   [facility] ONC CHELSI   [facility] Radiation Oncology 6    TREATMENT   8:30 AM   (45 min.)   [facility] ONC CHELSI   [facility] Radiation Oncology    OTV   9:00 AM   (30 min.)   Marvin Lunsford MD   [facility] Radiation Oncology 7    TREATMENT   8:30 AM   (45 min.)   [facility] ONC CHELSI   [facility] Radiation Oncology 8       9     10    TREATMENT   8:30 AM   (45 min.)   P RAD ONC CHELSI   [facility] Radiation Oncology 11     12     13     14     15       16     17     18     19     20    LAB CENTRAL   3:15 PM   (15 min.)    [facility] DRAW   [facility]    RETURN CCSL   3:45 PM   (30 min.)   Aamir Romo DO   [facility] 21     22       23     24     25     26     27     28     29       30  Happy Birthday!     31                                              Lab Results:  Recent Results (from the past 12 hours)   Creatinine    Collection Time: 02/25/25 10:06 AM   Result Value Ref Range    Creatinine 0.73 0.67 - 1.17 mg/dL    GFR Estimate >90 >60 mL/min/1.73m2   CBC with platelets and differential    Collection Time: 02/25/25 10:06 AM   Result  Value Ref Range    WBC Count 5.4 4.0 - 11.0 10e3/uL    RBC Count 3.95 (L) 4.40 - 5.90 10e6/uL    Hemoglobin 11.5 (L) 13.3 - 17.7 g/dL    Hematocrit 35.6 (L) 40.0 - 53.0 %    MCV 90 78 - 100 fL    MCH 29.1 26.5 - 33.0 pg    MCHC 32.3 31.5 - 36.5 g/dL    RDW 14.2 10.0 - 15.0 %    Platelet Count 158 150 - 450 10e3/uL    % Neutrophils 78 %    % Lymphocytes 7 %    % Monocytes 13 %    % Eosinophils 1 %    % Basophils 0 %    % Immature Granulocytes 1 %    NRBCs per 100 WBC 0 <1 /100    Absolute Neutrophils 4.3 1.6 - 8.3 10e3/uL    Absolute Lymphocytes 0.4 (L) 0.8 - 5.3 10e3/uL    Absolute Monocytes 0.7 0.0 - 1.3 10e3/uL    Absolute Eosinophils 0.0 0.0 - 0.7 10e3/uL    Absolute Basophils 0.0 0.0 - 0.2 10e3/uL    Absolute Immature Granulocytes 0.1 <=0.4 10e3/uL    Absolute NRBCs 0.0 10e3/uL

## 2025-02-25 NOTE — PROGRESS NOTES
Infusion Nursing Note:  Akira Plasencia presents today for D 29 C1 Taxol and Carboplatin.    Patient seen by provider today: Yes: Raquel Montanez CNP   present during visit today: Not Applicable.    Note: Akira had provider visit before infusion.  No concerns or questions.  Ready for Tx.        Intravenous Access:  Implanted Port.    Treatment Conditions:  Lab Results   Component Value Date    HGB 11.5 (L) 02/25/2025    WBC 5.4 02/25/2025    ANEUTAUTO 4.3 02/25/2025     02/25/2025        Lab Results   Component Value Date     01/28/2025    POTASSIUM 4.1 01/28/2025    CR 0.73 02/25/2025    RAF 9.7 01/28/2025       Results reviewed, labs MET treatment parameters, ok to proceed with treatment.      Post Infusion Assessment:  Patient tolerated infusion without incident.  Blood return noted pre and post infusion.  Site patent and intact, free from redness, edema or discomfort.  No evidence of extravasations.  Access discontinued per protocol.       Discharge Plan:   Prescription refills given for MMW.  Discharge instructions reviewed with: Patient.  Patient and/or family verbalized understanding of discharge instructions and all questions answered.  Copy of AVS reviewed with patient and/or family.  Patient will return 3/4 for next provider and infusion appointment.  Patient discharged in stable condition accompanied by: self.  Departure Mode: Ambulatory.      Carmen Lund RN

## 2025-02-25 NOTE — LETTER
2/25/2025      Akira Plasencia  [address] [place] [address]      Dear Colleague,    Thank you for referring your patient, Akira Plasencia, to the [facility]. Please see a copy of my visit note below.    Reason for Visit: seen in follow-up of lung cancer    Oncology HPI:   Diagnosis: stage IIIA adenocarcinoma of the lung     Akira Plasencia is a 63 yo man who was referred to Dr. Hutchinson for an abnormal CT.  He was treated for a pnuemonia in April 2024.  At that time, he was treated with antibiotics, but he had a repeat CT that showed residual RUL consolidation.  He underwent bronchoscopy on 8/26/24 that showed endobronchial lesion in RUL.  He had a biopsy that was non-diagnostic.   Pet scan on 11/20/24 showed RUL mass with FDG avidity.  There was FDG avid R Upper paratracheal node.   Biopsy was done on 12/13 by CT guidance - this showed lepidic adenocarcinoma, PD-1 <1%, NGS lung panel was negative for any pathologic identified mutation or Fusions.      His case was discussed at tumor board.  He has a large primary lesion.  At the tumor board, it was felt that he was probably resectable and we had discussed doing neoadjuvant chemoimmunotherapy.       He saw Dr. Blanton and while the mass looks like it is potentially resectable, it is very close to large mediastinal blood vessels and it was not clear that it would be resectable.  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  He then met with Dr. Romo to discuss chemoradiation followed by durvalumab.      1/29/25: start of chemo radiation with weekly taxol/carbo     Machelle Cohn, CNP saw on 2/4, tolerated chemo well. Redness/welling over port lessened. Completing doxycyline BID x 7 on 2/4. No fevers. Blood cx 1/29 negative.     Interval history:   Akira is feeling well. No nausea/vomiting. No change to  "baseline neuropathy. Has noted some increased difficulty with swallowing, but no pain associated with this. Still able to pass liquids and soft foods. Energy is good. Was able to ride his e-bike 40 miles yesterday. Has some soreness in the pelvis after riding, but otherwise feels good. Appetite is good.   -bowels are regular. Noted slight blood with wiping x 1. Is not aware of hemorrhoids  -urination wnl  -continues to smoke, about 3-4 cigarettes/day    Current Outpatient Medications   Medication Sig Dispense Refill     gabapentin (NEURONTIN) 300 MG capsule Take 1 capsule (300 mg) by mouth at bedtime for 2 days, THEN 1 capsule (300 mg) 2 times daily for 2 days, THEN 1 capsule (300 mg) 3 times daily. 126 capsule 0     ibuprofen (ADVIL/MOTRIN) 400 MG tablet Take 400 mg by mouth every 6 hours as needed for moderate pain. (Patient not taking: Reported on 1/7/2025)       magic mouthwash suspension (diphenhydrAMINE, lidocaine, aluminum-magnesium & simethicone) Swish and swallow 10 mLs in mouth every 6 hours as needed for mouth sores. (Patient not taking: Reported on 2/18/2025) 300 mL 2     prochlorperazine (COMPAZINE) 10 MG tablet Take 1 tablet (10 mg) by mouth every 6 hours as needed for nausea or vomiting. (Patient not taking: Reported on 2/18/2025) 30 tablet 2     UNABLE TO FIND Take 2 capsules by mouth daily as needed (\"for leg cramps\"). MEDICATION NAME: \"Cramp Defense (magnesium)\" (Patient not taking: Reported on 1/7/2025)          No Known Allergies      Exam: alert, appears well.  Blood pressure 111/72, pulse 93, temperature 97.6  F (36.4  C), temperature source Oral, resp. rate 16, weight 71.6 kg (157 lb 14.4 oz), SpO2 98%.  Wt Readings from Last 4 Encounters:   02/20/25 72.3 kg (159 lb 8 oz)   02/18/25 72.1 kg (159 lb)   02/13/25 72.6 kg (160 lb)   02/11/25 69.9 kg (154 lb 1.6 oz)     Scalp shows thinning hair. Oropharynx is moist, no focal lesion. Dentures noted. Lungs:bilateral basilar crackles, improves with " coughing. Heart:RRR, no murmur or rub. Abdomen: soft, nontender, BS active. Extremities: warm, no edema. Gait/station wnl. Skin: port in the R upper chest without redness or tenderness    Labs:    Latest Reference Range & Units 02/25/25 10:06   Creatinine 0.67 - 1.17 mg/dL 0.73   GFR Estimate >60 mL/min/1.73m2 >90   WBC 4.0 - 11.0 10e3/uL 5.4   Hemoglobin 13.3 - 17.7 g/dL 11.5 (L)   Hematocrit 40.0 - 53.0 % 35.6 (L)   Platelet Count 150 - 450 10e3/uL 158   RBC Count 4.40 - 5.90 10e6/uL 3.95 (L)   MCV 78 - 100 fL 90   MCH 26.5 - 33.0 pg 29.1   MCHC 31.5 - 36.5 g/dL 32.3   RDW 10.0 - 15.0 % 14.2   % Neutrophils % 78   % Lymphocytes % 7   % Monocytes % 13   % Eosinophils % 1   % Basophils % 0   Absolute Basophils 0.0 - 0.2 10e3/uL 0.0   Absolute Eosinophils 0.0 - 0.7 10e3/uL 0.0   Absolute Immature Granulocytes <=0.4 10e3/uL 0.1   Absolute Lymphocytes 0.8 - 5.3 10e3/uL 0.4 (L)   Absolute Monocytes 0.0 - 1.3 10e3/uL 0.7   % Immature Granulocytes % 1   Absolute Neutrophils 1.6 - 8.3 10e3/uL 4.3   Absolute NRBCs 10e3/uL 0.0   NRBCs per 100 WBC <1 /100 0   (L): Data is abnormally low      Impression/plan:   Clinical stage IIIA NSCLC adenocarcinoma, unresectable  -Initiated definitive chemoradiation on 1/29/25  -Tolerating Carboplatin/Paclitaxel well.   -mildly progressive dysphagia, but no significant pain  -labs reviewed and stable to proceed.  -will continue with Day 29 today, return in 1 week for evaluation prior to day 36.     Smoking cessation:   - Has significantly reduce usage from half/three quarters of a pack per day to 3 cigarettes per day. Has been unable to decrease further. Isn't sure he is able to. Reviewed benefits of cessation, offered other support    The longitudinal plan of care for the diagnosis(es)/condition(s) as documented were addressed during this visit. Due to the added complexity in care, I will continue to support Akira in the subsequent management and with ongoing continuity of  care.        Again, thank you for allowing me to participate in the care of your patient.        Sincerely,        YAHIR Betancur CNP    Electronically signed

## 2025-02-25 NOTE — NURSING NOTE
"Chief Complaint   Patient presents with    Port Draw     Labs drawn via port by RN in lab.  VS taken       Port accessed with 20 gauge 3/4\" Power needle by RN, labs collected, line flushed with saline and heparin.  Vitals taken. Pt checked in for appointment(s).    Pearl Sibley RN    "

## 2025-02-25 NOTE — NURSING NOTE
"Oncology Rooming Note    February 25, 2025 11:05 AM   Akira Plasencia is a 64 year old male who presents for:    Chief Complaint   Patient presents with    Port Draw     Labs drawn via port by RN in lab.  VS taken    Oncology Clinic Visit     Primary lung adenocarcinoma        Initial Vitals: /72   Pulse 93   Temp 97.6  F (36.4  C) (Oral)   Resp 16   Wt 71.6 kg (157 lb 14.4 oz)   SpO2 98%   BMI 26.28 kg/m   Estimated body mass index is 26.28 kg/m  as calculated from the following:    Height as of 1/28/25: 1.651 m (5' 5\").    Weight as of this encounter: 71.6 kg (157 lb 14.4 oz). Body surface area is 1.81 meters squared.  No Pain (0) Comment: Data Unavailable   No LMP for male patient.  Allergies reviewed: Yes  Medications reviewed: Yes    Medications: Medication refills not needed today.  Pharmacy name entered into [facility]: [facility] [id number]    Frailty Screening:   Is the patient here for a new oncology consult visit in cancer care? 2. No    PHQ9:  Did this patient require a PHQ9?: Yes   If the patient required a PHQ9 assessment, did the results require a follow up with the Provider/Nurse?: No      Clinical concerns:        Elle Nevarez CMA              "

## 2025-02-25 NOTE — PROGRESS NOTES
Reason for Visit: seen in follow-up of lung cancer    Oncology HPI:   Diagnosis: stage IIIA adenocarcinoma of the lung     Akira Plasencia is a 63 yo man who was referred to Dr. Hutchinson for an abnormal CT.  He was treated for a pnuemonia in April 2024.  At that time, he was treated with antibiotics, but he had a repeat CT that showed residual RUL consolidation.  He underwent bronchoscopy on 8/26/24 that showed endobronchial lesion in RUL.  He had a biopsy that was non-diagnostic.   Pet scan on 11/20/24 showed RUL mass with FDG avidity.  There was FDG avid R Upper paratracheal node.   Biopsy was done on 12/13 by CT guidance - this showed lepidic adenocarcinoma, PD-1 <1%, NGS lung panel was negative for any pathologic identified mutation or Fusions.      His case was discussed at tumor board.  He has a large primary lesion.  At the tumor board, it was felt that he was probably resectable and we had discussed doing neoadjuvant chemoimmunotherapy.       He saw Dr. Blanton and while the mass looks like it is potentially resectable, it is very close to large mediastinal blood vessels and it was not clear that it would be resectable.  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  He then met with Dr. Romo to discuss chemoradiation followed by durvalumab.      1/29/25: start of chemo radiation with weekly taxol/carbo     Machelle Cohn, CNP saw on 2/4, tolerated chemo well. Redness/welling over port lessened. Completing doxycyline BID x 7 on 2/4. No fevers. Blood cx 1/29 negative.     Interval history:   Akira is feeling well. No nausea/vomiting. No change to baseline neuropathy. Has noted some increased difficulty with swallowing, but no pain associated with this. Still able to pass liquids and soft foods. Energy is good. Was able to ride his e-bike 40 miles yesterday. Has some soreness in the  "pelvis after riding, but otherwise feels good. Appetite is good.   -bowels are regular. Noted slight blood with wiping x 1. Is not aware of hemorrhoids  -urination wnl  -continues to smoke, about 3-4 cigarettes/day    Current Outpatient Medications   Medication Sig Dispense Refill    gabapentin (NEURONTIN) 300 MG capsule Take 1 capsule (300 mg) by mouth at bedtime for 2 days, THEN 1 capsule (300 mg) 2 times daily for 2 days, THEN 1 capsule (300 mg) 3 times daily. 126 capsule 0    ibuprofen (ADVIL/MOTRIN) 400 MG tablet Take 400 mg by mouth every 6 hours as needed for moderate pain. (Patient not taking: Reported on 1/7/2025)      magic mouthwash suspension (diphenhydrAMINE, lidocaine, aluminum-magnesium & simethicone) Swish and swallow 10 mLs in mouth every 6 hours as needed for mouth sores. (Patient not taking: Reported on 2/18/2025) 300 mL 2    prochlorperazine (COMPAZINE) 10 MG tablet Take 1 tablet (10 mg) by mouth every 6 hours as needed for nausea or vomiting. (Patient not taking: Reported on 2/18/2025) 30 tablet 2    UNABLE TO FIND Take 2 capsules by mouth daily as needed (\"for leg cramps\"). MEDICATION NAME: \"Cramp Defense (magnesium)\" (Patient not taking: Reported on 1/7/2025)          No Known Allergies      Exam: alert, appears well.  Blood pressure 111/72, pulse 93, temperature 97.6  F (36.4  C), temperature source Oral, resp. rate 16, weight 71.6 kg (157 lb 14.4 oz), SpO2 98%.  Wt Readings from Last 4 Encounters:   02/20/25 72.3 kg (159 lb 8 oz)   02/18/25 72.1 kg (159 lb)   02/13/25 72.6 kg (160 lb)   02/11/25 69.9 kg (154 lb 1.6 oz)     Scalp shows thinning hair. Oropharynx is moist, no focal lesion. Dentures noted. Lungs:bilateral basilar crackles, improves with coughing. Heart:RRR, no murmur or rub. Abdomen: soft, nontender, BS active. Extremities: warm, no edema. Gait/station wnl. Skin: port in the R upper chest without redness or tenderness    Labs:    Latest Reference Range & Units 02/25/25 10:06 "   Creatinine 0.67 - 1.17 mg/dL 0.73   GFR Estimate >60 mL/min/1.73m2 >90   WBC 4.0 - 11.0 10e3/uL 5.4   Hemoglobin 13.3 - 17.7 g/dL 11.5 (L)   Hematocrit 40.0 - 53.0 % 35.6 (L)   Platelet Count 150 - 450 10e3/uL 158   RBC Count 4.40 - 5.90 10e6/uL 3.95 (L)   MCV 78 - 100 fL 90   MCH 26.5 - 33.0 pg 29.1   MCHC 31.5 - 36.5 g/dL 32.3   RDW 10.0 - 15.0 % 14.2   % Neutrophils % 78   % Lymphocytes % 7   % Monocytes % 13   % Eosinophils % 1   % Basophils % 0   Absolute Basophils 0.0 - 0.2 10e3/uL 0.0   Absolute Eosinophils 0.0 - 0.7 10e3/uL 0.0   Absolute Immature Granulocytes <=0.4 10e3/uL 0.1   Absolute Lymphocytes 0.8 - 5.3 10e3/uL 0.4 (L)   Absolute Monocytes 0.0 - 1.3 10e3/uL 0.7   % Immature Granulocytes % 1   Absolute Neutrophils 1.6 - 8.3 10e3/uL 4.3   Absolute NRBCs 10e3/uL 0.0   NRBCs per 100 WBC <1 /100 0   (L): Data is abnormally low      Impression/plan:   Clinical stage IIIA NSCLC adenocarcinoma, unresectable  -Initiated definitive chemoradiation on 1/29/25  -Tolerating Carboplatin/Paclitaxel well.   -mildly progressive dysphagia, but no significant pain  -labs reviewed and stable to proceed.  -will continue with Day 29 today, return in 1 week for evaluation prior to day 36.     Smoking cessation:   - Has significantly reduce usage from half/three quarters of a pack per day to 3 cigarettes per day. Has been unable to decrease further. Isn't sure he is able to. Reviewed benefits of cessation, offered other support    The longitudinal plan of care for the diagnosis(es)/condition(s) as documented were addressed during this visit. Due to the added complexity in care, I will continue to support Akira in the subsequent management and with ongoing continuity of care.

## 2025-02-26 ENCOUNTER — APPOINTMENT (OUTPATIENT)
Dept: RADIATION ONCOLOGY | Facility: CLINIC | Age: 65
End: 2025-02-26
Attending: RADIOLOGY
Payer: MEDICARE

## 2025-02-26 PROCEDURE — 77386 HC IMRT TREATMENT DELIVERY, COMPLEX: CPT | Performed by: STUDENT IN AN ORGANIZED HEALTH CARE EDUCATION/TRAINING PROGRAM

## 2025-02-26 PROCEDURE — 77014 PR CT GUIDE FOR PLACEMENT RADIATION THERAPY FIELDS: CPT | Mod: 26 | Performed by: STUDENT IN AN ORGANIZED HEALTH CARE EDUCATION/TRAINING PROGRAM

## 2025-02-27 ENCOUNTER — APPOINTMENT (OUTPATIENT)
Dept: RADIATION ONCOLOGY | Facility: CLINIC | Age: 65
End: 2025-02-27
Attending: RADIOLOGY
Payer: MEDICARE

## 2025-02-27 ENCOUNTER — OFFICE VISIT (OUTPATIENT)
Dept: RADIATION ONCOLOGY | Facility: CLINIC | Age: 65
End: 2025-02-27
Attending: STUDENT IN AN ORGANIZED HEALTH CARE EDUCATION/TRAINING PROGRAM
Payer: MEDICARE

## 2025-02-27 VITALS
WEIGHT: 155 LBS | HEART RATE: 88 BPM | SYSTOLIC BLOOD PRESSURE: 132 MMHG | DIASTOLIC BLOOD PRESSURE: 78 MMHG | BODY MASS INDEX: 25.79 KG/M2

## 2025-02-27 DIAGNOSIS — C34.91 PRIMARY LUNG ADENOCARCINOMA, RIGHT (H): Primary | ICD-10-CM

## 2025-02-27 PROCEDURE — 77386 HC IMRT TREATMENT DELIVERY, COMPLEX: CPT | Performed by: STUDENT IN AN ORGANIZED HEALTH CARE EDUCATION/TRAINING PROGRAM

## 2025-02-27 NOTE — LETTER
2025      Akira Plasencia  [address] [place] [address]      Dear Colleague,    Thank you for referring your patient, Akira Plasencia, to the [facility] RADIATION [facility]OG[facility]. Please see a copy of my visit note below.    Radiation Oncology On Treatment Visit Note    Akira Plasencia      Date: 2025   MRN: [id number]      : 1960  Diagnosis: Lung Cancer    Treatment Summary to Date  Treatment Site: RU Current Dose: 4600/6000 cGy Fractions:       Chemotherapy  Chemo concurrent with radx?: Yes  Oncologist: Dr Romo  Drug Name/Frequency 1: Taxol/Carboplatin    ED Visit/Hospital Admissions: None    Treatment Breaks: None    Subjective: On interview, the patient reports that he is doing well overall.  The patient continues to have a dry cough.  The patient also reports very mild dysphagia, needing to take small bites when he is eating.  The patient still able to maintain regular p.o. intake and with a normal diet.  The patient denies any odynophagia.  Patient reports mild fatigue, requiring a nap, as well as mild dermatitis over his treatment field.  The patient also confirmed that he has been taking his gabapentin as prescribed 3 times a day.    Nursing ROS:   Nutrition Alteration  Diet Type: Patient's Preference  Skin  Skin Reaction: 0 - No changes      ENT and Mouth Exam  Mucositis - Current: 0 - None      Gastrointestinal  Nausea: 0 - None     Psychosocial  Mood - Anxiety: 0 - Normal  Pain Assessment  0-10 Pain Scale: 0    Objective:   /78   Pulse 88   Wt 70.3 kg (155 lb)   BMI 25.79 kg/m    Gen: Appears well, NAD  Skin: Mild diffuse erythema over treatment field    Assessment:    Tolerating radiation therapy well.  All questions and concerns addressed.    Toxicities:  Grade 1 cough  Grade 1 dysphagia  Grade 1 fatigue  Grade 1 dermatitis    Plan:   Continue current therapy    Mosaiq chart and setup information reviewed. Imaging reviewed.     Patient was seen and  discussed with my attending physician, Dr. Lunsford.    Yann Sanchez MD, MS PGY-4  Radiation Oncology  Department of Radiation Oncology  [facility], [place]  Phone: [phone]        Again, thank you for allowing me to participate in the care of your patient.        Sincerely,        Marvin Lunsford MD    Electronically signed

## 2025-02-27 NOTE — PROGRESS NOTES
Radiation Oncology On Treatment Visit Note    Akira Plasencia      Date: 2025   MRN: [id number]      : 1960  Diagnosis: Lung Cancer    Treatment Summary to Date  Treatment Site: RUL Current Dose: 4600/6000 cGy Fractions:       Chemotherapy  Chemo concurrent with radx?: Yes  Oncologist: Dr Romo  Drug Name/Frequency 1: Taxol/Carboplatin    ED Visit/Hospital Admissions: None    Treatment Breaks: None    Subjective: On interview, the patient reports that he is doing well overall.  The patient continues to have a dry cough.  The patient also reports very mild dysphagia, needing to take small bites when he is eating.  The patient still able to maintain regular p.o. intake and with a normal diet.  The patient denies any odynophagia.  Patient reports mild fatigue, requiring a nap, as well as mild dermatitis over his treatment field.  The patient also confirmed that he has been taking his gabapentin as prescribed 3 times a day.    Nursing ROS:   Nutrition Alteration  Diet Type: Patient's Preference  Skin  Skin Reaction: 0 - No changes      ENT and Mouth Exam  Mucositis - Current: 0 - None      Gastrointestinal  Nausea: 0 - None     Psychosocial  Mood - Anxiety: 0 - Normal  Pain Assessment  0-10 Pain Scale: 0    Objective:   /78   Pulse 88   Wt 70.3 kg (155 lb)   BMI 25.79 kg/m    Gen: Appears well, NAD  Skin: Mild diffuse erythema over treatment field    Assessment:    Tolerating radiation therapy well.  All questions and concerns addressed.    Toxicities:  Grade 1 cough  Grade 1 dysphagia  Grade 1 fatigue  Grade 1 dermatitis    Plan:   Continue current therapy    Mosaiq chart and setup information reviewed. Imaging reviewed.     Patient was seen and discussed with my attending physician, Dr. Lunsford.    Yann Sanchez MD, MS PGY-4  Radiation Oncology  Department of Radiation Oncology  [facility]  Phone: [phone]

## 2025-02-28 ENCOUNTER — APPOINTMENT (OUTPATIENT)
Dept: RADIATION ONCOLOGY | Facility: CLINIC | Age: 65
End: 2025-02-28
Attending: RADIOLOGY
Payer: MEDICARE

## 2025-02-28 PROCEDURE — 99207 PR NO BILLABLE SERVICE THIS VISIT: CPT | Performed by: STUDENT IN AN ORGANIZED HEALTH CARE EDUCATION/TRAINING PROGRAM

## 2025-02-28 PROCEDURE — 77427 RADIATION TX MANAGEMENT X5: CPT | Performed by: STUDENT IN AN ORGANIZED HEALTH CARE EDUCATION/TRAINING PROGRAM

## 2025-02-28 PROCEDURE — 77386 HC IMRT TREATMENT DELIVERY, COMPLEX: CPT | Performed by: STUDENT IN AN ORGANIZED HEALTH CARE EDUCATION/TRAINING PROGRAM

## 2025-03-03 ENCOUNTER — APPOINTMENT (OUTPATIENT)
Dept: RADIATION ONCOLOGY | Facility: CLINIC | Age: 65
End: 2025-03-03
Attending: RADIOLOGY
Payer: MEDICARE

## 2025-03-03 PROCEDURE — 77014 PR CT GUIDE FOR PLACEMENT RADIATION THERAPY FIELDS: CPT | Mod: 26 | Performed by: STUDENT IN AN ORGANIZED HEALTH CARE EDUCATION/TRAINING PROGRAM

## 2025-03-03 PROCEDURE — 77386 HC IMRT TREATMENT DELIVERY, COMPLEX: CPT | Performed by: STUDENT IN AN ORGANIZED HEALTH CARE EDUCATION/TRAINING PROGRAM

## 2025-03-03 PROCEDURE — 77336 RADIATION PHYSICS CONSULT: CPT | Performed by: STUDENT IN AN ORGANIZED HEALTH CARE EDUCATION/TRAINING PROGRAM

## 2025-03-03 NOTE — PROGRESS NOTES
March 4, 2025    Reason for Visit: seen in follow-up of lung cancer    Oncology HPI:   Diagnosis: stage IIIA adenocarcinoma of the lung     Akira Plasencai is a 65 yo man who was referred to Dr. Hutchinson for an abnormal CT.  He was treated for a pnuemonia in April 2024.  At that time, he was treated with antibiotics, but he had a repeat CT that showed residual RUL consolidation.  He underwent bronchoscopy on 8/26/24 that showed endobronchial lesion in RUL.  He had a biopsy that was non-diagnostic.   Pet scan on 11/20/24 showed RUL mass with FDG avidity.  There was FDG avid R Upper paratracheal node.   Biopsy was done on 12/13 by CT guidance - this showed lepidic adenocarcinoma, PD-1 <1%, NGS lung panel was negative for any pathologic identified mutation or Fusions.      His case was discussed at tumor board.  He has a large primary lesion.  At the tumor board, it was felt that he was probably resectable and we had discussed doing neoadjuvant chemoimmunotherapy.       He saw Dr. Blanton and while the mass looks like it is potentially resectable, it is very close to large mediastinal blood vessels and it was not clear that it would be resectable.  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  He then met with Dr. Romo to discuss chemoradiation followed by durvalumab.      1/29/25: start of chemo radiation with weekly taxol/carbo     Machelle Cohn, CNP saw on 2/4, tolerated chemo well. Redness/welling over port lessened. Completing doxycyline BID x 7 on 2/4. No fevers. Blood cx 1/29 negative.     Interval history:   Akira is feeling well. No nausea/vomiting. No change to baseline neuropathy. Has noted some increased difficulty with swallowing, with increasing irritation associated with this. Still able to pass liquids and soft foods. Energy is good. Appetite is good. bowels are regular.  "  urination wnl. continues to smoke, about 3-4 cigarettes/day.    Current Outpatient Medications   Medication Sig Dispense Refill    gabapentin (NEURONTIN) 300 MG capsule Take 1 capsule (300 mg) by mouth at bedtime for 2 days, THEN 1 capsule (300 mg) 2 times daily for 2 days, THEN 1 capsule (300 mg) 3 times daily. 126 capsule 0    ibuprofen (ADVIL/MOTRIN) 400 MG tablet Take 400 mg by mouth every 6 hours as needed for moderate pain. (Patient not taking: Reported on 1/7/2025)      magic mouthwash suspension (diphenhydrAMINE, lidocaine, aluminum-magnesium & simethicone) Swish and swallow 10 mLs in mouth every 6 hours as needed for mouth sores. 300 mL 2    prochlorperazine (COMPAZINE) 10 MG tablet Take 1 tablet (10 mg) by mouth every 6 hours as needed for nausea or vomiting. (Patient not taking: Reported on 2/11/2025) 30 tablet 2    UNABLE TO FIND Take 2 capsules by mouth daily as needed (\"for leg cramps\"). MEDICATION NAME: \"Cramp Defense (magnesium)\" (Patient not taking: Reported on 1/7/2025)          No Known Allergies      Exam: alert, appears well.  Blood pressure 101/69, pulse 112, temperature 97.9  F (36.6  C), temperature source Oral, resp. rate 16, weight 70.7 kg (155 lb 14.4 oz), SpO2 98%.  Wt Readings from Last 4 Encounters:   03/04/25 70.7 kg (155 lb 14.4 oz)   02/27/25 70.3 kg (155 lb)   02/25/25 71.6 kg (157 lb 14.4 oz)   02/20/25 72.3 kg (159 lb 8 oz)     Scalp shows thinning hair. Oropharynx is moist, no focal lesion. Dentures noted. Lungs:bilateral basilar crackles, improves with coughing. Heart:RRR, no murmur or rub. Abdomen: soft, nontender, BS active. Extremities: warm, no edema. Gait/station wnl. Skin: port in the R upper chest without redness or tenderness    Labs:   Most Recent 3 CBC's:  Recent Labs   Lab Test 03/04/25  1025 02/25/25  1006 02/18/25  1006   WBC 4.7 5.4 4.9   HGB 12.2* 11.5* 13.3   MCV 89 90 88   * 158 248   ANEUTAUTO 3.6 4.3 3.5     Most Recent 3 BMP's:  Recent Labs   Lab " Test 03/04/25  1025 02/25/25  1006 02/18/25  1006 01/29/25  1217 01/28/25  1302 11/20/24  1014   NA  --   --   --   --  138  --    POTASSIUM  --   --   --   --  4.1  --    CHLORIDE  --   --   --   --  103  --    CO2  --   --   --   --  27  --    BUN  --   --   --   --  10.4  --    CR 0.72 0.73 0.74   < > 0.79  --    ANIONGAP  --   --   --   --  8  --    RAF  --   --   --   --  9.7  --    GLC  --   --   --   --  92 88    < > = values in this interval not displayed.    Most Recent 3 LFT's:No lab results found. Most Recent 2 TSH and T4:No lab results found.  I reviewed the above labs today.      Impression/plan:   Clinical stage IIIA NSCLC adenocarcinoma, unresectable  -Initiated definitive chemoradiation on 1/29/25  -Tolerating Carboplatin/Paclitaxel well.   -mildly progressive dysphagia, but no significant pain  -labs reviewed and stable to proceed.  -will continue with Day 36 today, return 3/20 for labs and follow up with Dr. Romo for further discussion re: transition to immunotherapy.     Smoking cessation:   - Has significantly reduce usage from half/three quarters of a pack per day to 3 cigarettes per day. Has been unable to decrease further. Isn't sure he is able to. Reviewed benefits of cessation, offered other support    The longitudinal plan of care for the diagnosis(es)/condition(s) as documented were addressed during this visit. Due to the added complexity in care, I will continue to support Akira in the subsequent management and with ongoing continuity of care.    30 minutes spent on the date of the encounter doing chart review, review of test results, interpretation of tests, patient visit, and documentation     Machelle Cohn, CNP on 3/4/2025 at 1:58 PM

## 2025-03-04 ENCOUNTER — INFUSION THERAPY VISIT (OUTPATIENT)
Dept: ONCOLOGY | Facility: CLINIC | Age: 65
End: 2025-03-04
Attending: NURSE PRACTITIONER
Payer: MEDICARE

## 2025-03-04 ENCOUNTER — APPOINTMENT (OUTPATIENT)
Dept: RADIATION ONCOLOGY | Facility: CLINIC | Age: 65
End: 2025-03-04
Attending: RADIOLOGY
Payer: MEDICARE

## 2025-03-04 ENCOUNTER — APPOINTMENT (OUTPATIENT)
Dept: LAB | Facility: CLINIC | Age: 65
End: 2025-03-04
Attending: NURSE PRACTITIONER
Payer: MEDICARE

## 2025-03-04 VITALS
TEMPERATURE: 97.9 F | HEART RATE: 112 BPM | RESPIRATION RATE: 16 BRPM | OXYGEN SATURATION: 98 % | DIASTOLIC BLOOD PRESSURE: 69 MMHG | WEIGHT: 155.9 LBS | SYSTOLIC BLOOD PRESSURE: 101 MMHG | BODY MASS INDEX: 25.94 KG/M2

## 2025-03-04 DIAGNOSIS — J44.9 CHRONIC OBSTRUCTIVE PULMONARY DISEASE, UNSPECIFIED COPD TYPE (H): ICD-10-CM

## 2025-03-04 DIAGNOSIS — C34.91 PRIMARY LUNG ADENOCARCINOMA, RIGHT (H): Primary | ICD-10-CM

## 2025-03-04 LAB
BASOPHILS # BLD AUTO: 0 10E3/UL (ref 0–0.2)
BASOPHILS NFR BLD AUTO: 0 %
CREAT SERPL-MCNC: 0.72 MG/DL (ref 0.67–1.17)
EGFRCR SERPLBLD CKD-EPI 2021: >90 ML/MIN/1.73M2
EOSINOPHIL # BLD AUTO: 0 10E3/UL (ref 0–0.7)
EOSINOPHIL NFR BLD AUTO: 1 %
ERYTHROCYTE [DISTWIDTH] IN BLOOD BY AUTOMATED COUNT: 14.3 % (ref 10–15)
HCT VFR BLD AUTO: 36.9 % (ref 40–53)
HGB BLD-MCNC: 12.2 G/DL (ref 13.3–17.7)
IMM GRANULOCYTES # BLD: 0.1 10E3/UL
IMM GRANULOCYTES NFR BLD: 1 %
LYMPHOCYTES # BLD AUTO: 0.4 10E3/UL (ref 0.8–5.3)
LYMPHOCYTES NFR BLD AUTO: 8 %
MCH RBC QN AUTO: 29.4 PG (ref 26.5–33)
MCHC RBC AUTO-ENTMCNC: 33.1 G/DL (ref 31.5–36.5)
MCV RBC AUTO: 89 FL (ref 78–100)
MONOCYTES # BLD AUTO: 0.6 10E3/UL (ref 0–1.3)
MONOCYTES NFR BLD AUTO: 13 %
NEUTROPHILS # BLD AUTO: 3.6 10E3/UL (ref 1.6–8.3)
NEUTROPHILS NFR BLD AUTO: 76 %
NRBC # BLD AUTO: 0 10E3/UL
NRBC BLD AUTO-RTO: 0 /100
PLATELET # BLD AUTO: 149 10E3/UL (ref 150–450)
RBC # BLD AUTO: 4.15 10E6/UL (ref 4.4–5.9)
WBC # BLD AUTO: 4.7 10E3/UL (ref 4–11)

## 2025-03-04 PROCEDURE — G0463 HOSPITAL OUTPT CLINIC VISIT: HCPCS | Performed by: NURSE PRACTITIONER

## 2025-03-04 PROCEDURE — 82565 ASSAY OF CREATININE: CPT | Performed by: INTERNAL MEDICINE

## 2025-03-04 PROCEDURE — 99214 OFFICE O/P EST MOD 30 MIN: CPT | Performed by: NURSE PRACTITIONER

## 2025-03-04 PROCEDURE — 77386 HC IMRT TREATMENT DELIVERY, COMPLEX: CPT | Performed by: STUDENT IN AN ORGANIZED HEALTH CARE EDUCATION/TRAINING PROGRAM

## 2025-03-04 PROCEDURE — 250N000011 HC RX IP 250 OP 636: Performed by: NURSE PRACTITIONER

## 2025-03-04 PROCEDURE — 96417 CHEMO IV INFUS EACH ADDL SEQ: CPT

## 2025-03-04 PROCEDURE — 85025 COMPLETE CBC W/AUTO DIFF WBC: CPT | Performed by: INTERNAL MEDICINE

## 2025-03-04 PROCEDURE — G2211 COMPLEX E/M VISIT ADD ON: HCPCS | Performed by: NURSE PRACTITIONER

## 2025-03-04 PROCEDURE — G0463 HOSPITAL OUTPT CLINIC VISIT: HCPCS | Mod: 25 | Performed by: NURSE PRACTITIONER

## 2025-03-04 PROCEDURE — 258N000003 HC RX IP 258 OP 636: Performed by: INTERNAL MEDICINE

## 2025-03-04 PROCEDURE — 96413 CHEMO IV INFUSION 1 HR: CPT

## 2025-03-04 PROCEDURE — 96375 TX/PRO/DX INJ NEW DRUG ADDON: CPT

## 2025-03-04 PROCEDURE — 250N000011 HC RX IP 250 OP 636: Performed by: INTERNAL MEDICINE

## 2025-03-04 PROCEDURE — 36591 DRAW BLOOD OFF VENOUS DEVICE: CPT | Performed by: INTERNAL MEDICINE

## 2025-03-04 RX ORDER — HEPARIN SODIUM (PORCINE) LOCK FLUSH IV SOLN 100 UNIT/ML 100 UNIT/ML
500 SOLUTION INTRAVENOUS ONCE
Status: COMPLETED | OUTPATIENT
Start: 2025-03-04 | End: 2025-03-04

## 2025-03-04 RX ORDER — HEPARIN SODIUM (PORCINE) LOCK FLUSH IV SOLN 100 UNIT/ML 100 UNIT/ML
5 SOLUTION INTRAVENOUS
Status: DISCONTINUED | OUTPATIENT
Start: 2025-03-04 | End: 2025-03-04 | Stop reason: HOSPADM

## 2025-03-04 RX ADMIN — HEPARIN 300 UNITS: 100 SYRINGE at 10:26

## 2025-03-04 RX ADMIN — HEPARIN 5 ML: 100 SYRINGE at 13:57

## 2025-03-04 RX ADMIN — DEXAMETHASONE SODIUM PHOSPHATE: 10 INJECTION, SOLUTION INTRAMUSCULAR; INTRAVENOUS at 12:04

## 2025-03-04 RX ADMIN — SODIUM CHLORIDE 250 ML: 9 INJECTION, SOLUTION INTRAVENOUS at 12:04

## 2025-03-04 RX ADMIN — PACLITAXEL 82 MG: 6 INJECTION, SOLUTION INTRAVENOUS at 12:23

## 2025-03-04 RX ADMIN — CARBOPLATIN 250 MG: 10 INJECTION, SOLUTION INTRAVENOUS at 13:25

## 2025-03-04 ASSESSMENT — PAIN SCALES - GENERAL: PAINLEVEL_OUTOF10: NO PAIN (0)

## 2025-03-04 NOTE — LETTER
3/4/2025      Akira Plasencia  [address] [place] [address]      Dear Colleague,    Thank you for referring your patient, Akira Plasencia, to the [facility]. Please see a copy of my visit note below.    March 4, 2025    Reason for Visit: seen in follow-up of lung cancer    Oncology HPI:   Diagnosis: stage IIIA adenocarcinoma of the lung     Akira Plasencia is a 63 yo man who was referred to Dr. Hutchinson for an abnormal CT.  He was treated for a pnuemonia in April 2024.  At that time, he was treated with antibiotics, but he had a repeat CT that showed residual RUL consolidation.  He underwent bronchoscopy on 8/26/24 that showed endobronchial lesion in RUL.  He had a biopsy that was non-diagnostic.   Pet scan on 11/20/24 showed RUL mass with FDG avidity.  There was FDG avid R Upper paratracheal node.   Biopsy was done on 12/13 by CT guidance - this showed lepidic adenocarcinoma, PD-1 <1%, NGS lung panel was negative for any pathologic identified mutation or Fusions.      His case was discussed at tumor board.  He has a large primary lesion.  At the tumor board, it was felt that he was probably resectable and we had discussed doing neoadjuvant chemoimmunotherapy.       He saw Dr. Blanton and while the mass looks like it is potentially resectable, it is very close to large mediastinal blood vessels and it was not clear that it would be resectable.  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  He then met with Dr. Romo to discuss chemoradiation followed by durvalumab.      1/29/25: start of chemo radiation with weekly taxol/carbo     Machelle Cohn, CNP saw on 2/4, tolerated chemo well. Redness/welling over port lessened. Completing doxycyline BID x 7 on 2/4. No fevers. Blood cx 1/29 negative.     Interval history:   Akira is feeling well. No  "nausea/vomiting. No change to baseline neuropathy. Has noted some increased difficulty with swallowing, with increasing irritation associated with this. Still able to pass liquids and soft foods. Energy is good. Appetite is good. bowels are regular.   urination wnl. continues to smoke, about 3-4 cigarettes/day.    Current Outpatient Medications   Medication Sig Dispense Refill     gabapentin (NEURONTIN) 300 MG capsule Take 1 capsule (300 mg) by mouth at bedtime for 2 days, THEN 1 capsule (300 mg) 2 times daily for 2 days, THEN 1 capsule (300 mg) 3 times daily. 126 capsule 0     ibuprofen (ADVIL/MOTRIN) 400 MG tablet Take 400 mg by mouth every 6 hours as needed for moderate pain. (Patient not taking: Reported on 1/7/2025)       magic mouthwash suspension (diphenhydrAMINE, lidocaine, aluminum-magnesium & simethicone) Swish and swallow 10 mLs in mouth every 6 hours as needed for mouth sores. 300 mL 2     prochlorperazine (COMPAZINE) 10 MG tablet Take 1 tablet (10 mg) by mouth every 6 hours as needed for nausea or vomiting. (Patient not taking: Reported on 2/11/2025) 30 tablet 2     UNABLE TO FIND Take 2 capsules by mouth daily as needed (\"for leg cramps\"). MEDICATION NAME: \"Cramp Defense (magnesium)\" (Patient not taking: Reported on 1/7/2025)          No Known Allergies      Exam: alert, appears well.  Blood pressure 101/69, pulse 112, temperature 97.9  F (36.6  C), temperature source Oral, resp. rate 16, weight 70.7 kg (155 lb 14.4 oz), SpO2 98%.  Wt Readings from Last 4 Encounters:   03/04/25 70.7 kg (155 lb 14.4 oz)   02/27/25 70.3 kg (155 lb)   02/25/25 71.6 kg (157 lb 14.4 oz)   02/20/25 72.3 kg (159 lb 8 oz)     Scalp shows thinning hair. Oropharynx is moist, no focal lesion. Dentures noted. Lungs:bilateral basilar crackles, improves with coughing. Heart:RRR, no murmur or rub. Abdomen: soft, nontender, BS active. Extremities: warm, no edema. Gait/station wnl. Skin: port in the R upper chest without redness or " tenderness    Labs:   Most Recent 3 CBC's:  Recent Labs   Lab Test 03/04/25  1025 02/25/25  1006 02/18/25  1006   WBC 4.7 5.4 4.9   HGB 12.2* 11.5* 13.3   MCV 89 90 88   * 158 248   ANEUTAUTO 3.6 4.3 3.5     Most Recent 3 BMP's:  Recent Labs   Lab Test 03/04/25  1025 02/25/25  1006 02/18/25  1006 01/29/25  1217 01/28/25  1302 11/20/24  1014   NA  --   --   --   --  138  --    POTASSIUM  --   --   --   --  4.1  --    CHLORIDE  --   --   --   --  103  --    CO2  --   --   --   --  27  --    BUN  --   --   --   --  10.4  --    CR 0.72 0.73 0.74   < > 0.79  --    ANIONGAP  --   --   --   --  8  --    RAF  --   --   --   --  9.7  --    GLC  --   --   --   --  92 88    < > = values in this interval not displayed.    Most Recent 3 LFT's:No lab results found. Most Recent 2 TSH and T4:No lab results found.  I reviewed the above labs today.      Impression/plan:   Clinical stage IIIA NSCLC adenocarcinoma, unresectable  -Initiated definitive chemoradiation on 1/29/25  -Tolerating Carboplatin/Paclitaxel well.   -mildly progressive dysphagia, but no significant pain  -labs reviewed and stable to proceed.  -will continue with Day 36 today, return 3/20 for labs and follow up with Dr. Romo for further discussion re: transition to immunotherapy.     Smoking cessation:   - Has significantly reduce usage from half/three quarters of a pack per day to 3 cigarettes per day. Has been unable to decrease further. Isn't sure he is able to. Reviewed benefits of cessation, offered other support    The longitudinal plan of care for the diagnosis(es)/condition(s) as documented were addressed during this visit. Due to the added complexity in care, I will continue to support Akira in the subsequent management and with ongoing continuity of care.    30 minutes spent on the date of the encounter doing chart review, review of test results, interpretation of tests, patient visit, and documentation     Machelle Cohn CNP on 3/4/2025 at 1:58  PM        Again, thank you for allowing me to participate in the care of your patient.        Sincerely,        Machelle Cohn CNP    Electronically signed

## 2025-03-04 NOTE — NURSING NOTE
"Oncology Rooming Note    March 4, 2025 10:41 AM   Akira Plasencia is a 64 year old male who presents for:    Chief Complaint   Patient presents with    Port Draw     Labs drawn via port by RN in lab.  VS taken    Oncology Clinic Visit     Primary lung adenocarcinoma      Initial Vitals: /69   Pulse 112   Temp 97.9  F (36.6  C) (Oral)   Resp 16   Wt 70.7 kg (155 lb 14.4 oz)   SpO2 98%   BMI 25.94 kg/m   Estimated body mass index is 25.94 kg/m  as calculated from the following:    Height as of 1/28/25: 1.651 m (5' 5\").    Weight as of this encounter: 70.7 kg (155 lb 14.4 oz). Body surface area is 1.8 meters squared.  No Pain (0) Comment: Data Unavailable   No LMP for male patient.  Allergies reviewed: Yes  Medications reviewed: Yes    Medications: Medication refills not needed today.  Pharmacy name entered into [facility]: [facility] [id number]    Frailty Screening:   Is the patient here for a new oncology consult visit in cancer care? 2. No    PHQ9:  Did this patient require a PHQ9?: No      Clinical concerns: none      Charles Calderon LPN              "

## 2025-03-04 NOTE — PROGRESS NOTES
Infusion Nursing Note:  Akira Plasencia presents today for C1D36 Taxol/Carboplatin.    Patient seen by provider today: Yes: Machelle Cohn NP   present during visit today: Not Applicable.    Note: No complaints. Patient states that today is his last infusion. Port-a-cath needs to flush every month if not in use. Patient verbalized understanding.       Intravenous Access:  Implanted Port.    Treatment Conditions:  Lab Results   Component Value Date    HGB 12.2 (L) 03/04/2025    WBC 4.7 03/04/2025    ANEUTAUTO 3.6 03/04/2025     (L) 03/04/2025        Lab Results   Component Value Date     01/28/2025    POTASSIUM 4.1 01/28/2025    CR 0.72 03/04/2025    RAF 9.7 01/28/2025       Results reviewed, labs MET treatment parameters, ok to proceed with treatment.      Post Infusion Assessment:  Patient tolerated infusion without incident.  Blood return noted pre and post infusion.  Site patent and intact, free from redness, edema or discomfort.  No evidence of extravasations.  Access discontinued per protocol.       Discharge Plan:   Patient declined prescription refills.  Discharge instructions reviewed with: Patient.  Patient and/or family verbalized understanding of discharge instructions and all questions answered.  AVS to patient via [employer].  Patient will return 3/20 for next provider appointment.   Patient discharged in stable condition accompanied by: self.  Departure Mode: Ambulatory.      OLIVER BROOKE RN

## 2025-03-04 NOTE — PATIENT INSTRUCTIONS
Contact Numbers  [facility]: [phone]    After Hours:  [phone]  Triage: [phone]    Please call the [facility] Triage line if you experience a temperature greater than or equal to 100.5, shaking chills, have uncontrolled nausea, vomiting and/or diarrhea, dizziness, shortness of breath, chest pain, bleeding, unexplained bruising, or if you have any other new/concerning symptoms, questions or concerns.     If it is after hours, weekends, or holidays, please call the main hospital  at  [phone] and ask to speak to the Oncology doctor on call.     If you are having any concerning symptoms or wish to speak to a provider before your next infusion visit, please call your care coordinator or triage to notify them so we can adequately serve you.     If you need a refill on a narcotic prescription or other medication, please call triage before your infusion appointment.         March 2025 Sunday Monday Tuesday Wednesday Thursday Friday Saturday                                 1       2     3    TREATMENT   8:30 AM   (45 min.)   [facility] RAD ONC [facility]SI   [facility] Radiation Oncology 4    TREATMENT   8:30 AM   (45 min.)   P RAD ONC CHELSI   [facility] Radiation Oncology    LAB CENTRAL  10:15 AM   (15 min.)   [facility] DRAW   [facility]    RETURN ACTIVE TREATMENT  10:45 AM   (45 min.)   Machelle Cohn CNP   [facility]    ONC INFUSION 2 HR (120 MIN)  12:00 PM   (120 min.)    ONC INFUSION NURSE   [facility] 5    TREATMENT   8:30 AM   (45 min.)   [facility] ONC [facility]   [facility] Radiation Oncology 6    TREATMENT   8:30 AM   (45 min.)   P RAD ONC CHELSI   [facility] Radiation Oncology    OTV   9:00 AM   (30 min.)   Marvin Lunsford MD   [facility] Radiation Oncology 7    TREATMENT   8:30 AM   (45 min.)   P RAD ONC CHELSI   [facility]  [facility] Radiation Oncology 8       9     10    TREATMENT   8:30 AM   (45 min.)   [facility] RAD ONC CHELSI   [facility] Radiation Oncology 11     12     13     14     15       16     17     18     19     20    LAB CENTRAL   3:15 PM   (15 min.)    [facility] DRAW   [facility]    RETURN CCSL   3:45 PM   (30 min.)   Aamir Romo DO   [facility] 21     22       23     24     25     26     27     28     29       30  Happy Birthday!     31 April 2025 Sunday Monday Tuesday Wednesday Thursday Friday Saturday             1     2     3     4     5       6     7     8     9     10     11     12       13     14     15     16     17     18     19       20     21     22     23     24     25     26       27     28     29     30                                    Lab Results:  Recent Results (from the past 12 hours)   Creatinine    Collection Time: 03/04/25 10:25 AM   Result Value Ref Range    Creatinine 0.72 0.67 - 1.17 mg/dL    GFR Estimate >90 >60 mL/min/1.73m2   CBC with platelets and differential    Collection Time: 03/04/25 10:25 AM   Result Value Ref Range    WBC Count 4.7 4.0 - 11.0 10e3/uL    RBC Count 4.15 (L) 4.40 - 5.90 10e6/uL    Hemoglobin 12.2 (L) 13.3 - 17.7 g/dL    Hematocrit 36.9 (L) 40.0 - 53.0 %    MCV 89 78 - 100 fL    MCH 29.4 26.5 - 33.0 pg    MCHC 33.1 31.5 - 36.5 g/dL    RDW 14.3 10.0 - 15.0 %    Platelet Count 149 (L) 150 - 450 10e3/uL    % Neutrophils 76 %    % Lymphocytes 8 %    % Monocytes 13 %    % Eosinophils 1 %    % Basophils 0 %    % Immature Granulocytes 1 %    NRBCs per 100 WBC 0 <1 /100    Absolute Neutrophils 3.6 1.6 - 8.3 10e3/uL    Absolute Lymphocytes 0.4 (L) 0.8 - 5.3 10e3/uL    Absolute Monocytes 0.6 0.0 - 1.3 10e3/uL    Absolute Eosinophils 0.0 0.0 - 0.7 10e3/uL    Absolute Basophils 0.0 0.0 - 0.2 10e3/uL    Absolute Immature Granulocytes 0.1 <=0.4 10e3/uL    Absolute NRBCs  0.0 10e3/uL

## 2025-03-05 ENCOUNTER — APPOINTMENT (OUTPATIENT)
Dept: RADIATION ONCOLOGY | Facility: CLINIC | Age: 65
End: 2025-03-05
Attending: RADIOLOGY
Payer: MEDICARE

## 2025-03-05 PROCEDURE — 77014 PR CT GUIDE FOR PLACEMENT RADIATION THERAPY FIELDS: CPT | Mod: 26 | Performed by: STUDENT IN AN ORGANIZED HEALTH CARE EDUCATION/TRAINING PROGRAM

## 2025-03-05 PROCEDURE — 77386 HC IMRT TREATMENT DELIVERY, COMPLEX: CPT | Performed by: STUDENT IN AN ORGANIZED HEALTH CARE EDUCATION/TRAINING PROGRAM

## 2025-03-06 ENCOUNTER — APPOINTMENT (OUTPATIENT)
Dept: RADIATION ONCOLOGY | Facility: CLINIC | Age: 65
End: 2025-03-06
Attending: RADIOLOGY
Payer: MEDICARE

## 2025-03-06 ENCOUNTER — OFFICE VISIT (OUTPATIENT)
Dept: RADIATION ONCOLOGY | Facility: CLINIC | Age: 65
End: 2025-03-06
Attending: STUDENT IN AN ORGANIZED HEALTH CARE EDUCATION/TRAINING PROGRAM
Payer: MEDICARE

## 2025-03-06 VITALS
DIASTOLIC BLOOD PRESSURE: 84 MMHG | OXYGEN SATURATION: 97 % | BODY MASS INDEX: 25.96 KG/M2 | HEART RATE: 78 BPM | SYSTOLIC BLOOD PRESSURE: 127 MMHG | WEIGHT: 156 LBS

## 2025-03-06 DIAGNOSIS — C34.91 PRIMARY LUNG ADENOCARCINOMA, RIGHT (H): Primary | ICD-10-CM

## 2025-03-06 PROCEDURE — 77386 HC IMRT TREATMENT DELIVERY, COMPLEX: CPT | Performed by: STUDENT IN AN ORGANIZED HEALTH CARE EDUCATION/TRAINING PROGRAM

## 2025-03-06 NOTE — LETTER
3/6/2025      Akira Plasencia  [address] [place] [address]      Dear Colleague,    Thank you for referring your patient, Akira Plasencia, to the [facility] RADIATION ONCOLOGY. Please see a copy of my visit note below.    Radiation Oncology On Treatment Visit Note    Akira Plasencia      Date: 2025   MRN: [id number]      : 1960  Diagnosis: Lung Cancer    Treatment Summary to Date  Treatment Site: RU Current Dose: 5600/6000 cGy Fractions:       Chemotherapy  Chemo concurrent with radx?: Yes  Oncologist: Dr Romo  Drug Name/Frequency 1: Taxol/Carboplatin    ED Visit/Hospital Admissions: None    Treatment Breaks: None    Subjective: On interview, the patient reports that he is doing well overall.  The patient continues to have a dry cough.  The patient also reports very mild dysphagia, needing to take small bites when he is eating.  The patient still able to maintain regular p.o. intake and with a normal diet.  The patient denies any odynophagia.  Patient reports mild fatigue, requiring a nap, as well as mild dermatitis over his treatment field.  The patient also confirmed that he has been taking his gabapentin as prescribed 3 times a day.    Nursing ROS:   Nutrition Alteration  Diet Type: Patient's Preference  Skin  Skin Reaction: 0 - No changes      ENT and Mouth Exam  Mucositis - Current: 0 - None      Gastrointestinal  Nausea: 0 - None     Psychosocial  Mood - Anxiety: 0 - Normal  Pain Assessment  0-10 Pain Scale: 0    Objective:   /84   Pulse 78   Wt 70.8 kg (156 lb)   SpO2 97%   BMI 25.96 kg/m    Gen: Appears well, NAD  Skin: Mild diffuse erythema over treatment field    Assessment:    Tolerating radiation therapy well.  All questions and concerns addressed.    Toxicities:  Grade 1 cough  Grade 1 dysphagia  Grade 1 fatigue  Grade 1 dermatitis    Plan:   Continue current therapy  Will plan for follow up in 3 weeks to assess recovery from radiation  therapy.    M[facility] chart and setup information reviewed. Imaging reviewed.       Marvin Lunsford MD  Radiation Oncology  Department of Radiation Oncology  [facility] [place]  Phone: [phone]        Again, thank you for allowing me to participate in the care of your patient.        Sincerely,        Marvin Lunsford MD    Electronically signed

## 2025-03-06 NOTE — PATIENT INSTRUCTIONS
Continuing Management of the Effects of Radiation Treatment    The side effects of radiation therapy should gradually decrease in 2 to 3 weeks after you have finished radiation.  Some effects take longer to resolve.    Skin reactions:  Skin changes (such as redness or irritation) should begin to get better gradually.  Some people will have a permanent change in skin color.  Their skin may be more pink or  tan  than the untreated skin.  The skin may be thinner or more fragile than before treatment.  Continue to use a gentle moisturizing lotion for several months.  You should always protect the skin in the area that was treated by using sunscreen of spf 30 or higher.      Other skin care instructions:    Fatigue caused by radiation therapy will decrease and your energy will improve.    For concerns or questions call Department of Therapeutic Radiology [phone]

## 2025-03-06 NOTE — PROGRESS NOTES
Radiation Oncology On Treatment Visit Note    Akira Plasencia      Date: 2025   MRN: [id number]      : 1960  Diagnosis: Lung Cancer    Treatment Summary to Date  Treatment Site: RUL Current Dose: 5600/6000 cGy Fractions:       Chemotherapy  Chemo concurrent with radx?: Yes  Oncologist: Dr Romo  Drug Name/Frequency 1: Taxol/Carboplatin    ED Visit/Hospital Admissions: None    Treatment Breaks: None    Subjective: On interview, the patient reports that he is doing well overall.  The patient continues to have a dry cough.  The patient also reports very mild dysphagia, needing to take small bites when he is eating.  The patient still able to maintain regular p.o. intake and with a normal diet.  The patient denies any odynophagia.  Patient reports mild fatigue, requiring a nap, as well as mild dermatitis over his treatment field.  The patient also confirmed that he has been taking his gabapentin as prescribed 3 times a day.    Nursing ROS:   Nutrition Alteration  Diet Type: Patient's Preference  Skin  Skin Reaction: 0 - No changes      ENT and Mouth Exam  Mucositis - Current: 0 - None      Gastrointestinal  Nausea: 0 - None     Psychosocial  Mood - Anxiety: 0 - Normal  Pain Assessment  0-10 Pain Scale: 0    Objective:   /84   Pulse 78   Wt 70.8 kg (156 lb)   SpO2 97%   BMI 25.96 kg/m    Gen: Appears well, NAD  Skin: Mild diffuse erythema over treatment field    Assessment:    Tolerating radiation therapy well.  All questions and concerns addressed.    Toxicities:  Grade 1 cough  Grade 1 dysphagia  Grade 1 fatigue  Grade 1 dermatitis    Plan:   Continue current therapy  Will plan for follow up in 3 weeks to assess recovery from radiation therapy.    [employer] chart and setup information reviewed. Imaging reviewed.       Marvin Lunsford MD  Radiation Oncology  Department of Radiation Oncology  [facility]  Phone: [phone]

## 2025-03-07 ENCOUNTER — APPOINTMENT (OUTPATIENT)
Dept: RADIATION ONCOLOGY | Facility: CLINIC | Age: 65
End: 2025-03-07
Attending: RADIOLOGY
Payer: MEDICARE

## 2025-03-07 PROCEDURE — 77386 HC IMRT TREATMENT DELIVERY, COMPLEX: CPT | Performed by: STUDENT IN AN ORGANIZED HEALTH CARE EDUCATION/TRAINING PROGRAM

## 2025-03-07 PROCEDURE — 77014 PR CT GUIDE FOR PLACEMENT RADIATION THERAPY FIELDS: CPT | Mod: 26 | Performed by: STUDENT IN AN ORGANIZED HEALTH CARE EDUCATION/TRAINING PROGRAM

## 2025-03-10 ENCOUNTER — APPOINTMENT (OUTPATIENT)
Dept: RADIATION ONCOLOGY | Facility: CLINIC | Age: 65
End: 2025-03-10
Attending: RADIOLOGY
Payer: MEDICARE

## 2025-03-10 PROCEDURE — 77427 RADIATION TX MANAGEMENT X5: CPT | Performed by: STUDENT IN AN ORGANIZED HEALTH CARE EDUCATION/TRAINING PROGRAM

## 2025-03-10 PROCEDURE — 77386 HC IMRT TREATMENT DELIVERY, COMPLEX: CPT | Performed by: STUDENT IN AN ORGANIZED HEALTH CARE EDUCATION/TRAINING PROGRAM

## 2025-03-10 PROCEDURE — 77014 PR CT GUIDE FOR PLACEMENT RADIATION THERAPY FIELDS: CPT | Mod: 26 | Performed by: STUDENT IN AN ORGANIZED HEALTH CARE EDUCATION/TRAINING PROGRAM

## 2025-03-10 PROCEDURE — 77336 RADIATION PHYSICS CONSULT: CPT | Performed by: STUDENT IN AN ORGANIZED HEALTH CARE EDUCATION/TRAINING PROGRAM

## 2025-03-11 ENCOUNTER — ONCOLOGY VISIT (OUTPATIENT)
Dept: RADIATION THERAPY | Facility: OUTPATIENT CENTER | Age: 65
End: 2025-03-11

## 2025-03-11 NOTE — LETTER
3/11/2025      Akira Plasencia  [address] [place] [address]      Dear Colleague,    Thank you for referring your patient, Akira Plasencia, to the [facility] RADIATION THERAPY CLINIC. Please see a copy of my visit note below.    No notes on file    Again, thank you for allowing me to participate in the care of your patient.        Sincerely,        Marvin Lunsford MD    Electronically signed

## 2025-03-12 NOTE — PROCEDURES
Radiotherapy Treatment Summary          Date of Report: 2025  PATIENT: SHAKIRA PLASENCIA  MEDICAL RECORD NO: [id number]  : 1960     DIAGNOSIS: C34.91 Malignant neoplasm of unspecified part of right bronchus or lung  INTENT OF RADIOTHERAPY: Cure  PATHOLOGY: NSCLC, adenocarcinoma  STAGE: T3 N2 M0 (IIIB)  CONCURRENT SYSTEMIC THERAPY: Taxol/Carboplatin  Details of the treatments summarized below are found in records kept in the Department of Radiation Oncology at [facility].  Treatment Summary:  Treatment Site                   Dose Modality                   From               To           Elapsed Days        Fx.  RUL                          6,000 cGy    06 X             1/28/2025     3/10/2025                 41                   30          Dose per Fraction: 200cGy     COMMENTS:   Mr. Plasencia is a 63yo man with locally advanced NSCLC who initially presented with pneumonia in 2024. CT showed a masslike opacity in the right upper lobe. Repeat CT showed lesion of the RUL and RML   6.9 x 6.3 x 4.1cm. RUL biopsy initially showed no malignancy. Followup CT showed enlargement of the right   suprahilar mass, paratracheal mediastinal and hilar nodes. PET (24) showed FDG avidity of the   suprahilar, paratracheal and RUL nodule. Biopsy (24) showed NSCLC, adenocarcinoma, TPS <1%.   Chemoradiation was recommended for local control.     The right upper lobe received 60Gy in 30 daily fractions with 6MV photons using tomotherapy technique.   Concurrent weekly carboplatin and paclitaxel was given; no doses were missed. During treatment, he developed   cough which did not require interventions. He developed dysphagia for which he took smaller bites, but did not   modify his diet. For his fatigue, he took more naps. He developed mild dermatitis which did not require   interventions. He tolerated treatment well without other issues.     ED visits/hospitalizations: none  Missed treatments: none  Acute  Toxicity Profile by CTC v5.0: grade 1 cough; grade 1 dysphagia; grade 1 fatigue; grade 1 dermatitis     PAIN MANAGEMENT: gabapentin, Tylenol prn     FOLLOW UP PLAN: See Dr. Romo on 3-20-25.     Resident Physician: Charity Muhammad M.D.  Staff Physician: Marvin Lunsford M.D.  CC: Aamir Romo MD                               Radiation Oncology:  [facility] [address], [address] [place] [phone]

## 2025-03-20 ENCOUNTER — APPOINTMENT (OUTPATIENT)
Dept: LAB | Facility: CLINIC | Age: 65
End: 2025-03-20
Attending: INTERNAL MEDICINE
Payer: MEDICARE

## 2025-03-20 DIAGNOSIS — C34.91 PRIMARY LUNG ADENOCARCINOMA, RIGHT (H): Primary | ICD-10-CM

## 2025-03-24 ENCOUNTER — MYC MEDICAL ADVICE (OUTPATIENT)
Dept: INTERVENTIONAL RADIOLOGY/VASCULAR | Facility: CLINIC | Age: 65
End: 2025-03-24
Payer: COMMERCIAL

## 2025-03-24 RX ORDER — NALOXONE HYDROCHLORIDE 0.4 MG/ML
0.4 INJECTION, SOLUTION INTRAMUSCULAR; INTRAVENOUS; SUBCUTANEOUS
Status: CANCELLED | OUTPATIENT
Start: 2025-03-24

## 2025-03-24 RX ORDER — ONDANSETRON 2 MG/ML
4 INJECTION INTRAMUSCULAR; INTRAVENOUS
Status: CANCELLED | OUTPATIENT
Start: 2025-03-24

## 2025-03-24 RX ORDER — NALOXONE HYDROCHLORIDE 0.4 MG/ML
0.2 INJECTION, SOLUTION INTRAMUSCULAR; INTRAVENOUS; SUBCUTANEOUS
Status: CANCELLED | OUTPATIENT
Start: 2025-03-24

## 2025-03-24 RX ORDER — FLUMAZENIL 0.1 MG/ML
0.2 INJECTION, SOLUTION INTRAVENOUS
Status: CANCELLED | OUTPATIENT
Start: 2025-03-24

## 2025-03-24 RX ORDER — FENTANYL CITRATE 50 UG/ML
25-50 INJECTION, SOLUTION INTRAMUSCULAR; INTRAVENOUS EVERY 5 MIN PRN
Status: CANCELLED | OUTPATIENT
Start: 2025-03-24

## 2025-03-25 ENCOUNTER — HOSPITAL ENCOUNTER (OUTPATIENT)
Facility: CLINIC | Age: 65
Discharge: HOME | End: 2025-03-25
Admitting: RADIOLOGY
Payer: MEDICARE

## 2025-03-25 ENCOUNTER — APPOINTMENT (OUTPATIENT)
Dept: INTERVENTIONAL RADIOLOGY/VASCULAR | Facility: CLINIC | Age: 65
End: 2025-03-25
Attending: INTERNAL MEDICINE
Payer: MEDICARE

## 2025-03-25 VITALS
RESPIRATION RATE: 16 BRPM | SYSTOLIC BLOOD PRESSURE: 121 MMHG | TEMPERATURE: 97.8 F | OXYGEN SATURATION: 97 % | BODY MASS INDEX: 24.99 KG/M2 | DIASTOLIC BLOOD PRESSURE: 79 MMHG | WEIGHT: 150 LBS | HEIGHT: 65 IN | HEART RATE: 87 BPM

## 2025-03-25 DIAGNOSIS — C34.91 PRIMARY LUNG ADENOCARCINOMA, RIGHT (H): ICD-10-CM

## 2025-03-25 PROCEDURE — 36590 REMOVAL TUNNELED CV CATH: CPT

## 2025-03-25 PROCEDURE — 250N000011 HC RX IP 250 OP 636: Performed by: RADIOLOGY

## 2025-03-25 PROCEDURE — 999N000163 HC STATISTIC SIMPLE TUBE INSERTION/CHARGE, PORT, CATH, FISTULOGRAM

## 2025-03-25 RX ORDER — ACETAMINOPHEN 325 MG/1
650 TABLET ORAL
Status: DISCONTINUED | OUTPATIENT
Start: 2025-03-25 | End: 2025-03-25 | Stop reason: HOSPADM

## 2025-03-25 RX ADMIN — LIDOCAINE HYDROCHLORIDE 10 ML: 10; .005 INJECTION, SOLUTION EPIDURAL; INFILTRATION; INTRACAUDAL; PERINEURAL at 12:22

## 2025-03-25 ASSESSMENT — ACTIVITIES OF DAILY LIVING (ADL)
ADLS_ACUITY_SCORE: 41
ADLS_ACUITY_SCORE: 41

## 2025-03-25 NOTE — IR NOTE
Interventional Radiology Intra-procedural Nursing Note    Patient Name: Akira Plasencia  Medical Record Number: [id number]  Today's Date: March 25, 2025    Procedure consented for : port removal   Start time: 1220  End time: 1235  Report provided to: Christiano ELIZABETH  Patient depart time and location: 1240 to CS 11    Note: Patient entered Interventional Radiology Suite number 2 via cart. Patient awake, alert and oriented. Assisted onto procedural table in supine position. Prepped and draped.  Dr. Esteban in room. Time out and procedure started.     Procedure well tolerated by patient without complications. Procedure end with debrief by Dr. Esteban.  Pressure held until hemostasis achieved. Steri strips and dermabond dressing applied to chest wall .      Administered medication totals:    Lidocaine with Epinephrine 10 mL Intradermal

## 2025-03-25 NOTE — PRE-PROCEDURE
GENERAL PRE-PROCEDURE:   Procedure:  Right port a catheter removal with local only  Date/Time:  3/25/2025 11:33 AM    Written consent obtained?: Yes    Risks and benefits: Risks, benefits and alternatives were discussed    DC Plan: Appropriate discharge home plan in place for patients who are going home after procedure   Consent given by:  Patient  Patient states understanding of procedure being performed: Yes    Procedure consent matches procedure scheduled: Yes      Patient doesn't want sedation. He thought he could come in early, get it done sooner and be out within an hour.     Patient states he had pain in his right ear and a right sided headache, and a severe dry cough last week Thursday into Friday.He thinks his port catheter was out of place causing these issues. His MD on Thursday 3/20 instructed him to take Ibuprofen 600 mg and he woke up on Saturday without any issues. He has a R pustule/swollen scabbed area on his fact on the right side of his nose which started on Friday 3/21 which he feels was caused by the port being out of place. No imaging was done.     We discussed he will have this looked at today and if he has a port related infection (the site is CD&I, no c/o redness or pain), IR will treat otherwise IR would not treat the facial pustule. He would need to go to the ED or UC. Patient in agreement with this.     Total time: 20 minutes    Thanks [facility] Interventional Radiology CNP ([phone]) (phone [phone])

## 2025-03-25 NOTE — PROGRESS NOTES
Care Suites Discharge Summary    Discharge Criteria:   Discharge Criteria met per MD orders: Yes.   Vital signs stable.     Pt demonstrates ability to ambulate safely: Yes.  (See discharge questionnaire for additional information)    Discharge instructions & education:   Discharge instructions reviewed with patient. Patient verbalizes  understanding.   Additional patient education provided: port insertion    Medications:   Patient will be discharging on new medications- No. Patient verbalizes reason for use, start date, and side effects NA.    Items returned to patient:   Home and hospital acquired medications returned to patient NA   Listed belongings gathered and returned to patient: Yes    Patient discharged to home.     Christiano Suárez RN

## 2025-03-25 NOTE — IR NOTE
Procedure Note for IR Procedure Dictation  Fluoro time: 0 minutes  Total Fluoro Dose: 0 mGy (Air Kerma)  Contrast: 0 mL   Local anesthetic: 10 mL 1% lidocaine w/ Epi  Conscious sedation: None

## 2025-03-25 NOTE — PROGRESS NOTES
Care Suites Admission Nursing Note    Patient Information  Name: Akira Plasencia  Age: 64 year old  Reason for admission: Port Removal  Care Suites arrival time: 1115    Visitor Information  Name: Kenneth     Patient Admission/Assessment   Pre-procedure assessment complete: Yes  If abnormal assessment/labs, provider notified: N/A  NPO: N/A.    Medications held per instructions/orders: Yes  Consent: obtained  If applicable, pregnancy test status: deferred  Patient oriented to room: Yes  Education/questions answered: Yes  Plan/other: Prep for procedure.  Pt refuses to have sedation.      Discharge Planning  Discharge name/phone number: Kenneth  Discharge location: home    Christiano Yee RN

## 2025-03-25 NOTE — DISCHARGE INSTRUCTIONS
Port Removal Discharge Instructions     After you go home:    Have an adult stay with you for the first 6 hours  You may resume your normal diet       For 24 hours - due to the sedation you received:  Relax and take it easy  Do NOT make any important or legal decisions  Do NOT drive or operate machines at home or at work  Do NOT drink alcohol    Care of Puncture Site:    Keep the dressings on your sites clean & dry for 24-48 hours & then remove dressings. Change it only if it gets wet or dirty.  You may take a shower 24-48 hours after your procedure. Do not scrub site.  No submerging site for 2 weeks or until the incision is completely healed.  Do not remove the small white strips of tape, if present. Allow them to fall off on their own.   You may cover the wound with a bandaid if needed for comfort.      Activity     Avoid heavy lifting (greater than 10 pounds) or the overuse of your shoulder for 48-72 hours.    Bleeding:    If you start bleeding from the incision site in your chest or have swelling in your neck, sit down and press on the site for 5-10 minutes.   If bleeding has not stopped after 10 minutes, call your provider.        Call 911 right away if you have heavy bleeding or bleeding that does not stop.      Medicines:    You may resume all medications  Resume your Warfarin/Coumadin at your regular dose today. Follow up with your provider to have your INR rechecked  Resume your Platelet Inhibitors and Aspirin tomorrow at your regular dose  For minor pain, you may take Acetaminophen (Tylenol) or Ibuprofen (Advil)          Call the provider who ordered this procedure if:    You have swelling in your neck or over your port site  The incision area is red, swollen, hot or tender  You have chills or a fever greater than 101 F (38 C)  Any questions or concerns    Call  911 or go to the Emergency Room if you have:    Severe chest pain or trouble breathing  Bleeding that you cannot control    If you have questions  call:        [facility] @ [phone]    Mon - Fri  7:30 am - 4 pm          Calls will be returned on the next business day  If you need immediate assistance - please be seen   in an Urgent Care or Emergency Department    You may also contact your provider via My Chart

## 2025-04-01 ENCOUNTER — DOCUMENTATION ONLY (OUTPATIENT)
Dept: ONCOLOGY | Facility: CLINIC | Age: 65
End: 2025-04-01
Payer: COMMERCIAL

## 2025-04-01 DIAGNOSIS — R53.83 FATIGUE: ICD-10-CM

## 2025-04-01 DIAGNOSIS — C34.91 PRIMARY LUNG ADENOCARCINOMA, RIGHT (H): Primary | ICD-10-CM

## 2025-04-03 ENCOUNTER — LAB (OUTPATIENT)
Dept: LAB | Facility: CLINIC | Age: 65
End: 2025-04-03
Payer: MEDICARE

## 2025-04-03 ENCOUNTER — INFUSION THERAPY VISIT (OUTPATIENT)
Dept: INFUSION THERAPY | Facility: CLINIC | Age: 65
End: 2025-04-03
Attending: THORACIC SURGERY (CARDIOTHORACIC VASCULAR SURGERY)
Payer: MEDICARE

## 2025-04-03 VITALS
BODY MASS INDEX: 24.99 KG/M2 | OXYGEN SATURATION: 98 % | HEART RATE: 106 BPM | TEMPERATURE: 98.6 F | DIASTOLIC BLOOD PRESSURE: 78 MMHG | WEIGHT: 150.2 LBS | RESPIRATION RATE: 18 BRPM | SYSTOLIC BLOOD PRESSURE: 123 MMHG

## 2025-04-03 DIAGNOSIS — C34.91 PRIMARY LUNG ADENOCARCINOMA, RIGHT (H): ICD-10-CM

## 2025-04-03 DIAGNOSIS — R53.83 FATIGUE: ICD-10-CM

## 2025-04-03 DIAGNOSIS — C34.91 PRIMARY LUNG ADENOCARCINOMA, RIGHT (H): Primary | ICD-10-CM

## 2025-04-03 LAB
ALBUMIN SERPL BCG-MCNC: 3.9 G/DL (ref 3.5–5.2)
ALP SERPL-CCNC: 103 U/L (ref 40–150)
ALT SERPL W P-5'-P-CCNC: 30 U/L (ref 0–70)
ANION GAP SERPL CALCULATED.3IONS-SCNC: 11 MMOL/L (ref 7–15)
AST SERPL W P-5'-P-CCNC: 22 U/L (ref 0–45)
BASOPHILS # BLD AUTO: 0 10E3/UL (ref 0–0.2)
BASOPHILS NFR BLD AUTO: 1 %
BILIRUB SERPL-MCNC: 0.4 MG/DL
BUN SERPL-MCNC: 11.5 MG/DL (ref 8–23)
CALCIUM SERPL-MCNC: 9.7 MG/DL (ref 8.8–10.4)
CHLORIDE SERPL-SCNC: 100 MMOL/L (ref 98–107)
CREAT SERPL-MCNC: 0.85 MG/DL (ref 0.67–1.17)
EGFRCR SERPLBLD CKD-EPI 2021: >90 ML/MIN/1.73M2
EOSINOPHIL # BLD AUTO: 0.1 10E3/UL (ref 0–0.7)
EOSINOPHIL NFR BLD AUTO: 1 %
ERYTHROCYTE [DISTWIDTH] IN BLOOD BY AUTOMATED COUNT: 15.2 % (ref 10–15)
GLUCOSE SERPL-MCNC: 122 MG/DL (ref 70–99)
HCO3 SERPL-SCNC: 27 MMOL/L (ref 22–29)
HCT VFR BLD AUTO: 40.4 % (ref 40–53)
HGB BLD-MCNC: 12.9 G/DL (ref 13.3–17.7)
IMM GRANULOCYTES # BLD: 0.1 10E3/UL
IMM GRANULOCYTES NFR BLD: 2 %
LYMPHOCYTES # BLD AUTO: 1.5 10E3/UL (ref 0.8–5.3)
LYMPHOCYTES NFR BLD AUTO: 19 %
MCH RBC QN AUTO: 29.4 PG (ref 26.5–33)
MCHC RBC AUTO-ENTMCNC: 31.9 G/DL (ref 31.5–36.5)
MCV RBC AUTO: 92 FL (ref 78–100)
MONOCYTES # BLD AUTO: 1.4 10E3/UL (ref 0–1.3)
MONOCYTES NFR BLD AUTO: 18 %
NEUTROPHILS # BLD AUTO: 4.6 10E3/UL (ref 1.6–8.3)
NEUTROPHILS NFR BLD AUTO: 60 %
NRBC # BLD AUTO: 0 10E3/UL
NRBC BLD AUTO-RTO: 0 /100
PLATELET # BLD AUTO: 383 10E3/UL (ref 150–450)
POTASSIUM SERPL-SCNC: 5.1 MMOL/L (ref 3.4–5.3)
PROT SERPL-MCNC: 7.3 G/DL (ref 6.4–8.3)
RBC # BLD AUTO: 4.39 10E6/UL (ref 4.4–5.9)
SODIUM SERPL-SCNC: 138 MMOL/L (ref 135–145)
TSH SERPL DL<=0.005 MIU/L-ACNC: 1.47 UIU/ML (ref 0.3–4.2)
WBC # BLD AUTO: 7.8 10E3/UL (ref 4–11)

## 2025-04-03 PROCEDURE — 82310 ASSAY OF CALCIUM: CPT

## 2025-04-03 PROCEDURE — 84443 ASSAY THYROID STIM HORMONE: CPT

## 2025-04-03 PROCEDURE — 85014 HEMATOCRIT: CPT

## 2025-04-03 PROCEDURE — 82247 BILIRUBIN TOTAL: CPT

## 2025-04-03 PROCEDURE — 250N000011 HC RX IP 250 OP 636: Mod: JZ | Performed by: INTERNAL MEDICINE

## 2025-04-03 PROCEDURE — 258N000003 HC RX IP 258 OP 636: Performed by: INTERNAL MEDICINE

## 2025-04-03 PROCEDURE — 85004 AUTOMATED DIFF WBC COUNT: CPT

## 2025-04-03 PROCEDURE — 36415 COLL VENOUS BLD VENIPUNCTURE: CPT

## 2025-04-03 RX ADMIN — SODIUM CHLORIDE 1500 MG: 9 INJECTION, SOLUTION INTRAVENOUS at 09:09

## 2025-04-03 RX ADMIN — SODIUM CHLORIDE 250 ML: 0.9 INJECTION, SOLUTION INTRAVENOUS at 08:58

## 2025-04-03 NOTE — PROGRESS NOTES
Infusion Nursing Note:  Akira Plasencia presents today for C1D1 Imfinzi.    Patient seen by provider today: No   present during visit today: Not Applicable.    Note: Patient oriented to infusion center and basic education given regarding Imfinzi.      Intravenous Access:  Peripheral IV placed.    Treatment Conditions:  Lab Results   Component Value Date    HGB 12.9 (L) 04/03/2025    WBC 7.8 04/03/2025    ANEUTAUTO 4.6 04/03/2025     04/03/2025        Lab Results   Component Value Date     04/03/2025    POTASSIUM 5.1 04/03/2025    CR 0.85 04/03/2025    RAF 9.7 04/03/2025    BILITOTAL 0.4 04/03/2025    ALBUMIN 3.9 04/03/2025    ALT 30 04/03/2025    AST 22 04/03/2025       Results reviewed, labs MET treatment parameters, ok to proceed with treatment.      Post Infusion Assessment:  Patient tolerated infusion without incident.  Blood return noted pre and post infusion.  Site patent and intact, free from redness, edema or discomfort.  No evidence of extravasations.  Access discontinued per protocol.       Discharge Plan:   Patient declined prescription refills.  Discharge instructions reviewed with: Patient.  Patient and/or family verbalized understanding of discharge instructions and all questions answered.  AVS to patient via [employer].  Patient will return 5/1 for next appointment.   Patient discharged in stable condition accompanied by: self.  Departure Mode: Ambulatory.      Dimitrios Esteban RN

## 2025-04-28 RX ORDER — HEPARIN SODIUM,PORCINE 10 UNIT/ML
5-20 VIAL (ML) INTRAVENOUS DAILY PRN
OUTPATIENT
Start: 2025-06-26

## 2025-04-28 RX ORDER — ALBUTEROL SULFATE 90 UG/1
1-2 INHALANT RESPIRATORY (INHALATION)
Status: CANCELLED
Start: 2025-05-01

## 2025-04-28 RX ORDER — HEPARIN SODIUM (PORCINE) LOCK FLUSH IV SOLN 100 UNIT/ML 100 UNIT/ML
5 SOLUTION INTRAVENOUS
OUTPATIENT
Start: 2025-07-24

## 2025-04-28 RX ORDER — EPINEPHRINE 1 MG/ML
0.3 INJECTION, SOLUTION, CONCENTRATE INTRAVENOUS EVERY 5 MIN PRN
Status: CANCELLED | OUTPATIENT
Start: 2025-05-01

## 2025-04-28 RX ORDER — DIPHENHYDRAMINE HYDROCHLORIDE 50 MG/ML
50 INJECTION, SOLUTION INTRAMUSCULAR; INTRAVENOUS
Start: 2025-05-29

## 2025-04-28 RX ORDER — HEPARIN SODIUM,PORCINE 10 UNIT/ML
5-20 VIAL (ML) INTRAVENOUS DAILY PRN
OUTPATIENT
Start: 2025-05-29

## 2025-04-28 RX ORDER — ALBUTEROL SULFATE 0.83 MG/ML
2.5 SOLUTION RESPIRATORY (INHALATION)
OUTPATIENT
Start: 2025-07-24

## 2025-04-28 RX ORDER — DIPHENHYDRAMINE HYDROCHLORIDE 50 MG/ML
50 INJECTION, SOLUTION INTRAMUSCULAR; INTRAVENOUS
Start: 2025-07-24

## 2025-04-28 RX ORDER — MEPERIDINE HYDROCHLORIDE 25 MG/ML
25 INJECTION INTRAMUSCULAR; INTRAVENOUS; SUBCUTANEOUS
Status: CANCELLED | OUTPATIENT
Start: 2025-05-01

## 2025-04-28 RX ORDER — DIPHENHYDRAMINE HYDROCHLORIDE 50 MG/ML
50 INJECTION, SOLUTION INTRAMUSCULAR; INTRAVENOUS
Start: 2025-06-26

## 2025-04-28 RX ORDER — DIPHENHYDRAMINE HYDROCHLORIDE 50 MG/ML
25 INJECTION, SOLUTION INTRAMUSCULAR; INTRAVENOUS
Status: CANCELLED
Start: 2025-05-01

## 2025-04-28 RX ORDER — HEPARIN SODIUM (PORCINE) LOCK FLUSH IV SOLN 100 UNIT/ML 100 UNIT/ML
5 SOLUTION INTRAVENOUS
OUTPATIENT
Start: 2025-06-26

## 2025-04-28 RX ORDER — ALBUTEROL SULFATE 0.83 MG/ML
2.5 SOLUTION RESPIRATORY (INHALATION)
Status: CANCELLED | OUTPATIENT
Start: 2025-05-01

## 2025-04-28 RX ORDER — LORAZEPAM 2 MG/ML
0.5 INJECTION INTRAMUSCULAR EVERY 4 HOURS PRN
OUTPATIENT
Start: 2025-05-29

## 2025-04-28 RX ORDER — HEPARIN SODIUM (PORCINE) LOCK FLUSH IV SOLN 100 UNIT/ML 100 UNIT/ML
5 SOLUTION INTRAVENOUS
Status: CANCELLED | OUTPATIENT
Start: 2025-05-01

## 2025-04-28 RX ORDER — HEPARIN SODIUM,PORCINE 10 UNIT/ML
5-20 VIAL (ML) INTRAVENOUS DAILY PRN
Status: CANCELLED | OUTPATIENT
Start: 2025-05-01

## 2025-04-28 RX ORDER — DIPHENHYDRAMINE HYDROCHLORIDE 50 MG/ML
25 INJECTION, SOLUTION INTRAMUSCULAR; INTRAVENOUS
Start: 2025-05-29

## 2025-04-28 RX ORDER — LORAZEPAM 2 MG/ML
0.5 INJECTION INTRAMUSCULAR EVERY 4 HOURS PRN
OUTPATIENT
Start: 2025-07-24

## 2025-04-28 RX ORDER — ALBUTEROL SULFATE 0.83 MG/ML
2.5 SOLUTION RESPIRATORY (INHALATION)
OUTPATIENT
Start: 2025-06-26

## 2025-04-28 RX ORDER — LORAZEPAM 2 MG/ML
0.5 INJECTION INTRAMUSCULAR EVERY 4 HOURS PRN
OUTPATIENT
Start: 2025-06-26

## 2025-04-28 RX ORDER — ALBUTEROL SULFATE 90 UG/1
1-2 INHALANT RESPIRATORY (INHALATION)
Start: 2025-06-26

## 2025-04-28 RX ORDER — DIPHENHYDRAMINE HYDROCHLORIDE 50 MG/ML
50 INJECTION, SOLUTION INTRAMUSCULAR; INTRAVENOUS
Status: CANCELLED
Start: 2025-05-01

## 2025-04-28 RX ORDER — EPINEPHRINE 1 MG/ML
0.3 INJECTION, SOLUTION, CONCENTRATE INTRAVENOUS EVERY 5 MIN PRN
OUTPATIENT
Start: 2025-07-24

## 2025-04-28 RX ORDER — MEPERIDINE HYDROCHLORIDE 25 MG/ML
25 INJECTION INTRAMUSCULAR; INTRAVENOUS; SUBCUTANEOUS
OUTPATIENT
Start: 2025-06-26

## 2025-04-28 RX ORDER — ALBUTEROL SULFATE 0.83 MG/ML
2.5 SOLUTION RESPIRATORY (INHALATION)
OUTPATIENT
Start: 2025-05-29

## 2025-04-28 RX ORDER — MEPERIDINE HYDROCHLORIDE 25 MG/ML
25 INJECTION INTRAMUSCULAR; INTRAVENOUS; SUBCUTANEOUS
OUTPATIENT
Start: 2025-05-29

## 2025-04-28 RX ORDER — MEPERIDINE HYDROCHLORIDE 25 MG/ML
25 INJECTION INTRAMUSCULAR; INTRAVENOUS; SUBCUTANEOUS
OUTPATIENT
Start: 2025-07-24

## 2025-04-28 RX ORDER — HEPARIN SODIUM,PORCINE 10 UNIT/ML
5-20 VIAL (ML) INTRAVENOUS DAILY PRN
OUTPATIENT
Start: 2025-07-24

## 2025-04-28 RX ORDER — ALBUTEROL SULFATE 90 UG/1
1-2 INHALANT RESPIRATORY (INHALATION)
Start: 2025-05-29

## 2025-04-28 RX ORDER — EPINEPHRINE 1 MG/ML
0.3 INJECTION, SOLUTION, CONCENTRATE INTRAVENOUS EVERY 5 MIN PRN
OUTPATIENT
Start: 2025-06-26

## 2025-04-28 RX ORDER — EPINEPHRINE 1 MG/ML
0.3 INJECTION, SOLUTION, CONCENTRATE INTRAVENOUS EVERY 5 MIN PRN
OUTPATIENT
Start: 2025-05-29

## 2025-04-28 RX ORDER — DIPHENHYDRAMINE HYDROCHLORIDE 50 MG/ML
25 INJECTION, SOLUTION INTRAMUSCULAR; INTRAVENOUS
Start: 2025-06-26

## 2025-04-28 RX ORDER — ALBUTEROL SULFATE 90 UG/1
1-2 INHALANT RESPIRATORY (INHALATION)
Start: 2025-07-24

## 2025-04-28 RX ORDER — HEPARIN SODIUM (PORCINE) LOCK FLUSH IV SOLN 100 UNIT/ML 100 UNIT/ML
5 SOLUTION INTRAVENOUS
OUTPATIENT
Start: 2025-05-29

## 2025-04-28 RX ORDER — LORAZEPAM 2 MG/ML
0.5 INJECTION INTRAMUSCULAR EVERY 4 HOURS PRN
Status: CANCELLED | OUTPATIENT
Start: 2025-05-01

## 2025-04-28 RX ORDER — DIPHENHYDRAMINE HYDROCHLORIDE 50 MG/ML
25 INJECTION, SOLUTION INTRAMUSCULAR; INTRAVENOUS
Start: 2025-07-24

## 2025-05-01 ENCOUNTER — LAB (OUTPATIENT)
Dept: LAB | Facility: CLINIC | Age: 65
End: 2025-05-01
Payer: MEDICARE

## 2025-05-01 ENCOUNTER — INFUSION THERAPY VISIT (OUTPATIENT)
Dept: INFUSION THERAPY | Facility: CLINIC | Age: 65
End: 2025-05-01
Attending: INTERNAL MEDICINE
Payer: MEDICARE

## 2025-05-01 VITALS
DIASTOLIC BLOOD PRESSURE: 75 MMHG | RESPIRATION RATE: 16 BRPM | TEMPERATURE: 98.5 F | WEIGHT: 151 LBS | BODY MASS INDEX: 25.13 KG/M2 | OXYGEN SATURATION: 97 % | SYSTOLIC BLOOD PRESSURE: 116 MMHG | HEART RATE: 85 BPM

## 2025-05-01 DIAGNOSIS — C34.91 PRIMARY LUNG ADENOCARCINOMA, RIGHT (H): ICD-10-CM

## 2025-05-01 DIAGNOSIS — R53.83 FATIGUE: ICD-10-CM

## 2025-05-01 DIAGNOSIS — C34.91 PRIMARY LUNG ADENOCARCINOMA, RIGHT (H): Primary | ICD-10-CM

## 2025-05-01 LAB
ALBUMIN SERPL BCG-MCNC: 3.9 G/DL (ref 3.5–5.2)
ALP SERPL-CCNC: 99 U/L (ref 40–150)
ALT SERPL W P-5'-P-CCNC: 17 U/L (ref 0–70)
ANION GAP SERPL CALCULATED.3IONS-SCNC: 9 MMOL/L (ref 7–15)
AST SERPL W P-5'-P-CCNC: 18 U/L (ref 0–45)
BILIRUB SERPL-MCNC: 0.4 MG/DL
BUN SERPL-MCNC: 9.1 MG/DL (ref 8–23)
CALCIUM SERPL-MCNC: 9.2 MG/DL (ref 8.8–10.4)
CHLORIDE SERPL-SCNC: 102 MMOL/L (ref 98–107)
CREAT SERPL-MCNC: 0.78 MG/DL (ref 0.67–1.17)
EGFRCR SERPLBLD CKD-EPI 2021: >90 ML/MIN/1.73M2
GLUCOSE SERPL-MCNC: 68 MG/DL (ref 70–99)
HCO3 SERPL-SCNC: 28 MMOL/L (ref 22–29)
POTASSIUM SERPL-SCNC: 4.5 MMOL/L (ref 3.4–5.3)
PROT SERPL-MCNC: 6.9 G/DL (ref 6.4–8.3)
SODIUM SERPL-SCNC: 139 MMOL/L (ref 135–145)
TSH SERPL DL<=0.005 MIU/L-ACNC: 1.17 UIU/ML (ref 0.3–4.2)

## 2025-05-01 PROCEDURE — 258N000003 HC RX IP 258 OP 636: Performed by: INTERNAL MEDICINE

## 2025-05-01 PROCEDURE — 84155 ASSAY OF PROTEIN SERUM: CPT

## 2025-05-01 PROCEDURE — 84443 ASSAY THYROID STIM HORMONE: CPT

## 2025-05-01 PROCEDURE — 36415 COLL VENOUS BLD VENIPUNCTURE: CPT

## 2025-05-01 RX ADMIN — SODIUM CHLORIDE 250 ML: 0.9 INJECTION, SOLUTION INTRAVENOUS at 09:15

## 2025-05-01 RX ADMIN — SODIUM CHLORIDE 1500 MG: 9 INJECTION, SOLUTION INTRAVENOUS at 09:15

## 2025-05-01 ASSESSMENT — PAIN SCALES - GENERAL: PAINLEVEL_OUTOF10: NO PAIN (0)

## 2025-05-01 NOTE — PROGRESS NOTES
Infusion Nursing Note:  Akira Plasencia presents today for bibiana.    Patient seen by provider today: No   present during visit today: Not Applicable.    Note: pt denies any changes in health or new concerns.      Intravenous Access:  Peripheral IV placed.    Treatment Conditions:  Lab Results   Component Value Date     05/01/2025    POTASSIUM 4.5 05/01/2025    CR 0.78 05/01/2025    RAF 9.2 05/01/2025    BILITOTAL 0.4 05/01/2025    ALBUMIN 3.9 05/01/2025    ALT 17 05/01/2025    AST 18 05/01/2025       Results reviewed, labs MET treatment parameters, ok to proceed with treatment.      Post Infusion Assessment:  Patient tolerated infusion without incident.  Site patent and intact, free from redness, edema or discomfort.  No evidence of extravasations.  Access discontinued per protocol.       Discharge Plan:   Patient and/or family verbalized understanding of discharge instructions and all questions answered.  AVS to patient via [employer].  Patient will return 5/29 for next appointment.   Patient discharged in stable condition accompanied by: self.  Departure Mode: Ambulatory.      Catrina Flores RN

## 2025-05-29 ENCOUNTER — INFUSION THERAPY VISIT (OUTPATIENT)
Dept: INFUSION THERAPY | Facility: CLINIC | Age: 65
End: 2025-05-29
Attending: INTERNAL MEDICINE
Payer: MEDICARE

## 2025-05-29 ENCOUNTER — LAB (OUTPATIENT)
Dept: LAB | Facility: CLINIC | Age: 65
End: 2025-05-29
Payer: MEDICARE

## 2025-05-29 VITALS
BODY MASS INDEX: 25.49 KG/M2 | WEIGHT: 153 LBS | TEMPERATURE: 97.8 F | SYSTOLIC BLOOD PRESSURE: 126 MMHG | HEART RATE: 93 BPM | DIASTOLIC BLOOD PRESSURE: 75 MMHG | HEIGHT: 65 IN | RESPIRATION RATE: 16 BRPM | OXYGEN SATURATION: 98 %

## 2025-05-29 DIAGNOSIS — C34.91 PRIMARY LUNG ADENOCARCINOMA, RIGHT (H): ICD-10-CM

## 2025-05-29 DIAGNOSIS — R53.83 FATIGUE: ICD-10-CM

## 2025-05-29 DIAGNOSIS — C34.91 PRIMARY LUNG ADENOCARCINOMA, RIGHT (H): Primary | ICD-10-CM

## 2025-05-29 LAB
ALBUMIN SERPL BCG-MCNC: 3.7 G/DL (ref 3.5–5.2)
ALP SERPL-CCNC: 99 U/L (ref 40–150)
ALT SERPL W P-5'-P-CCNC: 18 U/L (ref 0–70)
ANION GAP SERPL CALCULATED.3IONS-SCNC: 9 MMOL/L (ref 7–15)
AST SERPL W P-5'-P-CCNC: 19 U/L (ref 0–45)
BILIRUB SERPL-MCNC: 0.3 MG/DL
BUN SERPL-MCNC: 13.1 MG/DL (ref 8–23)
CALCIUM SERPL-MCNC: 9.1 MG/DL (ref 8.8–10.4)
CHLORIDE SERPL-SCNC: 101 MMOL/L (ref 98–107)
CREAT SERPL-MCNC: 0.84 MG/DL (ref 0.67–1.17)
EGFRCR SERPLBLD CKD-EPI 2021: >90 ML/MIN/1.73M2
GLUCOSE SERPL-MCNC: 193 MG/DL (ref 70–99)
HCO3 SERPL-SCNC: 28 MMOL/L (ref 22–29)
POTASSIUM SERPL-SCNC: 4.8 MMOL/L (ref 3.4–5.3)
PROT SERPL-MCNC: 6.3 G/DL (ref 6.4–8.3)
SODIUM SERPL-SCNC: 138 MMOL/L (ref 135–145)
TSH SERPL DL<=0.005 MIU/L-ACNC: 1.27 UIU/ML (ref 0.3–4.2)

## 2025-05-29 PROCEDURE — 82947 ASSAY GLUCOSE BLOOD QUANT: CPT

## 2025-05-29 PROCEDURE — 84443 ASSAY THYROID STIM HORMONE: CPT

## 2025-05-29 PROCEDURE — 258N000003 HC RX IP 258 OP 636: Performed by: INTERNAL MEDICINE

## 2025-05-29 PROCEDURE — 36415 COLL VENOUS BLD VENIPUNCTURE: CPT

## 2025-05-29 RX ADMIN — SODIUM CHLORIDE 1500 MG: 9 INJECTION, SOLUTION INTRAVENOUS at 09:15

## 2025-05-29 RX ADMIN — SODIUM CHLORIDE 250 ML: 0.9 INJECTION, SOLUTION INTRAVENOUS at 09:15

## 2025-05-29 ASSESSMENT — PAIN SCALES - GENERAL: PAINLEVEL_OUTOF10: NO PAIN (0)

## 2025-05-29 NOTE — PROGRESS NOTES
Infusion Nursing Note:  Akira Plasencia presents today for Imfinzi.    Patient seen by provider today: No   present during visit today: Not Applicable.    Note: N/A.      Intravenous Access:  Peripheral IV placed.    Treatment Conditions:  Lab Results   Component Value Date     05/29/2025    POTASSIUM 4.8 05/29/2025    CR 0.84 05/29/2025    RAF 9.1 05/29/2025    BILITOTAL 0.3 05/29/2025    ALBUMIN 3.7 05/29/2025    ALT 18 05/29/2025    AST 19 05/29/2025       Results reviewed, labs MET treatment parameters, ok to proceed with treatment.      Post Infusion Assessment:  Patient tolerated infusion without incident.  Blood return noted pre and post infusion.  Site patent and intact, free from redness, edema or discomfort.  No evidence of extravasations.  Access discontinued per protocol.       Discharge Plan:   Patient declined prescription refills.  Discharge instructions reviewed with: Patient.  Patient and/or family verbalized understanding of discharge instructions and all questions answered.  AVS to patient via [employer].  Patient will return 6/26/25 for next appointment.   Patient discharged in stable condition accompanied by: self.  Departure Mode: Ambulatory.      Araceli Osborn RN

## 2025-06-20 ENCOUNTER — ANCILLARY PROCEDURE (OUTPATIENT)
Dept: CT IMAGING | Facility: CLINIC | Age: 65
End: 2025-06-20
Attending: INTERNAL MEDICINE
Payer: COMMERCIAL

## 2025-06-20 DIAGNOSIS — C34.91 PRIMARY LUNG ADENOCARCINOMA, RIGHT (H): ICD-10-CM

## 2025-06-20 PROCEDURE — 74177 CT ABD & PELVIS W/CONTRAST: CPT | Performed by: RADIOLOGY

## 2025-06-20 PROCEDURE — 71260 CT THORAX DX C+: CPT | Performed by: RADIOLOGY

## 2025-06-20 RX ORDER — IOPAMIDOL 755 MG/ML
83 INJECTION, SOLUTION INTRAVASCULAR ONCE
Status: COMPLETED | OUTPATIENT
Start: 2025-06-20 | End: 2025-06-20

## 2025-06-20 RX ADMIN — IOPAMIDOL 83 ML: 755 INJECTION, SOLUTION INTRAVASCULAR at 07:29

## 2025-06-20 NOTE — DISCHARGE INSTRUCTIONS

## 2025-06-24 ENCOUNTER — VIRTUAL VISIT (OUTPATIENT)
Dept: ONCOLOGY | Facility: CLINIC | Age: 65
End: 2025-06-24
Attending: INTERNAL MEDICINE
Payer: COMMERCIAL

## 2025-06-24 VITALS — WEIGHT: 150 LBS | HEIGHT: 65 IN | BODY MASS INDEX: 24.99 KG/M2

## 2025-06-24 DIAGNOSIS — C34.91 PRIMARY LUNG ADENOCARCINOMA, RIGHT (H): Primary | ICD-10-CM

## 2025-06-24 PROCEDURE — 1126F AMNT PAIN NOTED NONE PRSNT: CPT | Performed by: INTERNAL MEDICINE

## 2025-06-24 PROCEDURE — 98006 SYNCH AUDIO-VIDEO EST MOD 30: CPT | Performed by: INTERNAL MEDICINE

## 2025-06-24 ASSESSMENT — PAIN SCALES - GENERAL: PAINLEVEL_OUTOF10: NO PAIN (0)

## 2025-06-24 NOTE — PROGRESS NOTES
Virtual Visit Details    Type of service:  Video Visit   Video Start Time: 1447  Video End Time:1519    Originating Location (pt. Location): Home    Distant Location (provider location):  On-site  Platform used for Video Visit: [employer]      REASON FOR VISIT:    CANCER STAGE:  Cancer Staging   No matching staging information was found for the patient.        HISTORY OF PRESENT ILLNESS:  Akira Plasencia is a 63 yo man who was referred to Dr. Hutchinson for an abnormal CT.  He was treated for a pnuemonia in April 2024.  At that time, he was treated with antibiotics, but he had a repeat CT that showed residual RUL consolidation.  He underwent bronchoscopy on 8/26/24 that showed endobronchial lesion in RUL.  He had a biopsy that was non-diagnostic.   Pet scan on 11/20/24 showed RUL mass with FDG avidity.  There was FDG avid R Upper paratracheal node.   Biopsy was done on 12/13 by CT guidance - this showed lepidic adenocarcinoma, PD-1 <1%, NGS lung panel was negative for any pathologic identified mutation or Fusions.      His case was discussed at tumor board.  He has a large primary lesion.  At the tumor board, it was felt that he was probably resectable and we had discussed doing neoadjuvant chemoimmunotherapy.       He saw Dr. Blanton and while the mass looks like it is potentially resectable, it is very close to large mediastinal blood vessels and it was not clear that it would be resectable.  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  He then met with Dr. Romo to discuss chemoradiation followed by durvalumab.      1/29/25: start of chemo radiation with weekly taxol/carbo     Machelle Cohn, CNP saw on 2/4, tolerated chemo well. Redness/welling over port lessened. Completing doxycyline BID x 7 on 2/4. No fevers. Blood cx 1/29 negative.      Completed radiation on 3/10/25    Started  consolidative durvalumab on 4/3/25 and received 3rd cycle on 5/29/25.     6/24/25- Reviewed CT chest, which shows significant disease response    C/o increasing fatigue and sleepiness. Needs to take few 1-3 hr naps during the daytime, and still needs to sleep during the night d/t persistent fatigue. Also endorses lightheadedness with getting up. Weight and appetite stable. Swallowing problems have resolved, and C/o cough while lying down, with no halitosis. Mildly productive with very scant sputum. SOBOE on walking more than half a mile. Able to do ADLs as usual. Gabapentin has been stopped. No port.         Review Of Systems  10-point review of systems were negative except as noted in HPI.        EXAM:  There were no vitals taken for this visit.  GEN: alert and oriented x 3, nad      Current Outpatient Medications   Medication Sig Dispense Refill    magic mouthwash suspension (diphenhydrAMINE, lidocaine, aluminum-magnesium & simethicone) Swish and swallow 10 mLs in mouth every 6 hours as needed for mouth sores. (Patient not taking: Reported on 5/29/2025) 300 mL 2           Recent Labs   Lab Test 04/03/25  0715   WBC 7.8   HGB 12.9*        @labrcent[na,potassium,chloride,co2,bun,cr@  Recent Labs   Lab Test 05/29/25  0800   PROTTOTAL 6.3*   ALBUMIN 3.7   BILITOTAL 0.3   AST 19   ALT 18   ALKPHOS 99         Recent Results (from the past 744 hours)   CT Chest/Abdomen/Pelvis w Contrast    Narrative    EXAMINATION: CT CHEST/ABDOMEN/PELVIS W CONTRAST, 6/20/2025 7:38 AM    TECHNIQUE: Helical CT images from the thoracic inlet through the  symphysis pubis were obtained with intravenous contrast. Contrast  dose: Isovue 370 83cc    COMPARISON: 11/29/2024, 11/20/2024, 11/5/2024    HISTORY: Primary lung adenocarcinoma, right (H)    FINDINGS:    Chest: Mild/moderate predominant centrilobular and paraseptal  emphysematous change. Decreased right upper lobe spiculated mass  measuring 3.9 x 3.7 cm in greatest axial dimension,  previously 6.9 x  4.6 cm, with increased adjacent scarring and architectural distortion.  Stable calcified granulomas in the right upper and lower lobes. No  suspicious new or enlarging pulmonary nodule. The central  tracheobronchial tree is clear. No pleural effusion or pneumothorax.    Normal heart size. No pericardial effusion. Moderate coronary artery  calcifications with possible left anterior descending coronary artery  stent. Normal caliber ascending aorta and main pulmonary artery. No  central pulmonary embolism. Slightly decreased prominent and  borderline enlarged mediastinal and right hilar lymph nodes. Calcified  mediastinal and right hilar lymph nodes.    Abdomen and pelvis: Normal liver, gallbladder, biliary tree, and  pancreas. Costophrenic granulomas in the nonenlarged spleen. Normal  adrenal glands and renal cortices. No hydronephrosis, hydroureter, or  urinary tract stone or the bladder is decompressed. Dystrophic  calcifications in the mildly enlarged prostate. Symmetric seminal  vesicles. No free fluid or free air. No bowel obstruction or  inflammation. Sigmoid colonic diverticulosis. Normal appendix. The  major abdominal vasculature is patent. Moderate aortoiliac  atherosclerotic calcification without aneurysmal dilation. No  abdominal or pelvic lymphadenopathy.    Bones and soft tissues: No acute or aggressive osseous abnormality.  Chronic bilateral L5 pars interarticularis defects.      Impression    IMPRESSION:   1. Decreased right upper lobe malignancy, compatible with treatment  response.  2. Decreased prominent and borderline enlarged mediastinal and right  hilar lymph nodes. Recommend attention on follow-up.  3. No evidence of metastatic disease in the abdomen or pelvis.     ARLENE LOGAN DO         SYSTEM ID:  [id number]           Assessment/Plan  Stage 3A NSCLC - completed chemoradiation and is now getting durvalumab consolidation. Continue with durvalumab as he has no evidence of  recurrent disease, with significant response to the disease.  Plan for 3 month follow-up with repeat CT Chest, and continue durvalumab for a year.     Weakness, fatigue, presyncope- concern for adrenal insufficiency with durvalumab (no electrolyte abnormality on CMP). No recent CBC, and hence, anaemia cannot be R/o. Is able to walk half-a-mile w/o SOB, and is able to sleep flat without any air hunger, so less likely to be d/t heart failure. Gabapentin has be discontinued. Fatigue is a S/E of  durvalumab, but this would not be a typical presentation. Would need an in-person visit with MENDEZ at [facility], with CBC, AM Cortisol, and ACTH    I saw the patient with Dr. Romo and agree with his note.  Pt has no evidence of disease recurrence. His fatigue seems greater than what I would expect from durvalumab or his prior therapy - will repeat labs at his next durva infusion and also look at cortisol and ACTH.  His TSH has been ok.  Will repeat CBC which has not been done in awhile.     I spent 30 minutes with the patient, reviewing imaging, and documenting this note on the date of the encounter.     Aamir Romo   of Medicine  Division of Hematology, Oncology, and Transplantation

## 2025-06-24 NOTE — LETTER
6/24/2025      Akira Plasencia  [address] [place] [address]      Dear Colleague,    Thank you for referring your patient, Akira Plasencia, to the [facility]. Please see a copy of my visit note below.    Virtual Visit Details    Type of service:  Video Visit   Video Start Time: 1447  Video End Time:1519    Originating Location (pt. Location): Home    Distant Location (provider location):  On-site  Platform used for Video Visit: [facility]      REASON FOR VISIT:    CANCER STAGE:  Cancer Staging   No matching staging information was found for the patient.        HISTORY OF PRESENT ILLNESS:  Akira Plasencia is a 65 yo man who was referred to Dr. Hutchinson for an abnormal CT.  He was treated for a pnuemonia in April 2024.  At that time, he was treated with antibiotics, but he had a repeat CT that showed residual RUL consolidation.  He underwent bronchoscopy on 8/26/24 that showed endobronchial lesion in RUL.  He had a biopsy that was non-diagnostic.   Pet scan on 11/20/24 showed RUL mass with FDG avidity.  There was FDG avid R Upper paratracheal node.   Biopsy was done on 12/13 by CT guidance - this showed lepidic adenocarcinoma, PD-1 <1%, NGS lung panel was negative for any pathologic identified mutation or Fusions.      His case was discussed at tumor board.  He has a large primary lesion.  At the tumor board, it was felt that he was probably resectable and we had discussed doing neoadjuvant chemoimmunotherapy.       He saw Dr. Blanton and while the mass looks like it is potentially resectable, it is very close to large mediastinal blood vessels and it was not clear that it would be resectable.  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  He then met with Dr. Romo to discuss chemoradiation followed by durvalumab.      1/29/25: start of chemo radiation with  weekly taxol/carbo     Machelle Cohn, CNP saw on 2/4, tolerated chemo well. Redness/welling over port lessened. Completing doxycyline BID x 7 on 2/4. No fevers. Blood cx 1/29 negative.      Completed radiation on 3/10/25    Started consolidative durvalumab on 4/3/25 and received 3rd cycle on 5/29/25.     6/24/25- Reviewed CT chest, which shows significant disease response    C/o increasing fatigue and sleepiness. Needs to take few 1-3 hr naps during the daytime, and still needs to sleep during the night d/t persistent fatigue. Also endorses lightheadedness with getting up. Weight and appetite stable. Swallowing problems have resolved, and C/o cough while lying down, with no halitosis. Mildly productive with very scant sputum. SOBOE on walking more than half a mile. Able to do ADLs as usual. Gabapentin has been stopped. No port.         Review Of Systems  10-point review of systems were negative except as noted in HPI.        EXAM:  There were no vitals taken for this visit.  GEN: alert and oriented x 3, nad      Current Outpatient Medications   Medication Sig Dispense Refill     magic mouthwash suspension (diphenhydrAMINE, lidocaine, aluminum-magnesium & simethicone) Swish and swallow 10 mLs in mouth every 6 hours as needed for mouth sores. (Patient not taking: Reported on 5/29/2025) 300 mL 2           Recent Labs   Lab Test 04/03/25  0715   WBC 7.8   HGB 12.9*        @labrcent[na,potassium,chloride,co2,bun,cr@  Recent Labs   Lab Test 05/29/25  0800   PROTTOTAL 6.3*   ALBUMIN 3.7   BILITOTAL 0.3   AST 19   ALT 18   ALKPHOS 99         Recent Results (from the past 744 hours)   CT Chest/Abdomen/Pelvis w Contrast    Narrative    EXAMINATION: CT CHEST/ABDOMEN/PELVIS W CONTRAST, 6/20/2025 7:38 AM    TECHNIQUE: Helical CT images from the thoracic inlet through the  symphysis pubis were obtained with intravenous contrast. Contrast  dose: Isovue 370 83cc    COMPARISON: 11/29/2024, 11/20/2024, 11/5/2024    HISTORY:  Primary lung adenocarcinoma, right (H)    FINDINGS:    Chest: Mild/moderate predominant centrilobular and paraseptal  emphysematous change. Decreased right upper lobe spiculated mass  measuring 3.9 x 3.7 cm in greatest axial dimension, previously 6.9 x  4.6 cm, with increased adjacent scarring and architectural distortion.  Stable calcified granulomas in the right upper and lower lobes. No  suspicious new or enlarging pulmonary nodule. The central  tracheobronchial tree is clear. No pleural effusion or pneumothorax.    Normal heart size. No pericardial effusion. Moderate coronary artery  calcifications with possible left anterior descending coronary artery  stent. Normal caliber ascending aorta and main pulmonary artery. No  central pulmonary embolism. Slightly decreased prominent and  borderline enlarged mediastinal and right hilar lymph nodes. Calcified  mediastinal and right hilar lymph nodes.    Abdomen and pelvis: Normal liver, gallbladder, biliary tree, and  pancreas. Costophrenic granulomas in the nonenlarged spleen. Normal  adrenal glands and renal cortices. No hydronephrosis, hydroureter, or  urinary tract stone or the bladder is decompressed. Dystrophic  calcifications in the mildly enlarged prostate. Symmetric seminal  vesicles. No free fluid or free air. No bowel obstruction or  inflammation. Sigmoid colonic diverticulosis. Normal appendix. The  major abdominal vasculature is patent. Moderate aortoiliac  atherosclerotic calcification without aneurysmal dilation. No  abdominal or pelvic lymphadenopathy.    Bones and soft tissues: No acute or aggressive osseous abnormality.  Chronic bilateral L5 pars interarticularis defects.      Impression    IMPRESSION:   1. Decreased right upper lobe malignancy, compatible with treatment  response.  2. Decreased prominent and borderline enlarged mediastinal and right  hilar lymph nodes. Recommend attention on follow-up.  3. No evidence of metastatic disease in the  abdomen or pelvis.     ARLENE LOGAN DO         SYSTEM ID:  [id number]           Assessment/Plan  Stage 3A NSCLC - completed chemoradiation and is now getting durvalumab consolidation. Continue with durvalumab as he has no evidence of recurrent disease, with significant response to the disease.  Plan for 3 month follow-up with repeat CT Chest, and continue durvalumab for a year.     Weakness, fatigue, presyncope- concern for adrenal insufficiency with durvalumab (no electrolyte abnormality on CMP). No recent CBC, and hence, anaemia cannot be R/o. Is able to walk half-a-mile w/o SOB, and is able to sleep flat without any air hunger, so less likely to be d/t heart failure. Gabapentin has be discontinued. Fatigue is a S/E of  durvalumab, but this would not be a typical presentation. Would need an in-person visit with MEN[facility] at [facility], with CBC, AM Cortisol, and ACTH    I saw the patient with Dr. Romo and agree with his note.  Pt has no evidence of disease recurrence. His fatigue seems greater than what I would expect from durvalumab or his prior therapy - will repeat labs at his next durva infusion and also look at cortisol and ACTH.  His TSH has been ok.  Will repeat CBC which has not been done in awhile.     I spent 30 minutes with the patient, reviewing imaging, and documenting this note on the date of the encounter.     Aamir Romo   of Medicine  Division of Hematology, Oncology, and Transplantation    Again, thank you for allowing me to participate in the care of your patient.        Sincerely,        Aamir Romo, DO    Electronically signed

## 2025-06-24 NOTE — NURSING NOTE
Current patient location: [address] [place] [id number]    Is the patient currently in the state of [place]? YES    Visit mode: VIDEO    If the visit is dropped, the patient can be reconnected by:VIDEO VISIT: Text to cell phone:   Telephone Information:   Mobile [phone]       Will anyone else be joining the visit? NO  (If patient encounters technical issues they should call [phone] :[phone])    Are changes needed to the allergy or medication list? Pt stated no changes to allergies and Pt stated no med changes    Are refills needed on medications prescribed by this physician? NO    Rooming Documentation:  Questionnaire(s) completed    Reason for visit: ABUNDIO MARINO

## 2025-06-25 RX ORDER — LORAZEPAM 2 MG/ML
0.5 INJECTION INTRAMUSCULAR EVERY 4 HOURS PRN
OUTPATIENT
Start: 2025-09-18

## 2025-06-25 RX ORDER — ALBUTEROL SULFATE 0.83 MG/ML
2.5 SOLUTION RESPIRATORY (INHALATION)
OUTPATIENT
Start: 2025-09-18

## 2025-06-25 RX ORDER — METHYLPREDNISOLONE SODIUM SUCCINATE 40 MG/ML
40 INJECTION INTRAMUSCULAR; INTRAVENOUS
Start: 2025-09-18

## 2025-06-25 RX ORDER — EPINEPHRINE 1 MG/ML
0.3 INJECTION, SOLUTION INTRAMUSCULAR; SUBCUTANEOUS EVERY 5 MIN PRN
OUTPATIENT
Start: 2025-09-18

## 2025-06-25 RX ORDER — HEPARIN SODIUM,PORCINE 10 UNIT/ML
5-20 VIAL (ML) INTRAVENOUS DAILY PRN
OUTPATIENT
Start: 2025-08-21

## 2025-06-25 RX ORDER — MEPERIDINE HYDROCHLORIDE 25 MG/ML
25 INJECTION INTRAMUSCULAR; INTRAVENOUS; SUBCUTANEOUS
OUTPATIENT
Start: 2025-09-18

## 2025-06-25 RX ORDER — ALBUTEROL SULFATE 90 UG/1
1-2 INHALANT RESPIRATORY (INHALATION)
Start: 2025-08-21

## 2025-06-25 RX ORDER — HEPARIN SODIUM (PORCINE) LOCK FLUSH IV SOLN 100 UNIT/ML 100 UNIT/ML
5 SOLUTION INTRAVENOUS
OUTPATIENT
Start: 2025-08-21

## 2025-06-25 RX ORDER — HEPARIN SODIUM,PORCINE 10 UNIT/ML
5-20 VIAL (ML) INTRAVENOUS DAILY PRN
OUTPATIENT
Start: 2025-09-18

## 2025-06-25 RX ORDER — LORAZEPAM 2 MG/ML
0.5 INJECTION INTRAMUSCULAR EVERY 4 HOURS PRN
OUTPATIENT
Start: 2025-08-21

## 2025-06-25 RX ORDER — METHYLPREDNISOLONE SODIUM SUCCINATE 40 MG/ML
40 INJECTION INTRAMUSCULAR; INTRAVENOUS
Start: 2025-08-21

## 2025-06-25 RX ORDER — DIPHENHYDRAMINE HYDROCHLORIDE 50 MG/ML
25 INJECTION, SOLUTION INTRAMUSCULAR; INTRAVENOUS
Start: 2025-08-21

## 2025-06-25 RX ORDER — ALBUTEROL SULFATE 90 UG/1
1-2 INHALANT RESPIRATORY (INHALATION)
Start: 2025-09-18

## 2025-06-25 RX ORDER — DIPHENHYDRAMINE HYDROCHLORIDE 50 MG/ML
50 INJECTION, SOLUTION INTRAMUSCULAR; INTRAVENOUS
Start: 2025-08-21

## 2025-06-25 RX ORDER — EPINEPHRINE 1 MG/ML
0.3 INJECTION, SOLUTION INTRAMUSCULAR; SUBCUTANEOUS EVERY 5 MIN PRN
OUTPATIENT
Start: 2025-08-21

## 2025-06-25 RX ORDER — ALBUTEROL SULFATE 0.83 MG/ML
2.5 SOLUTION RESPIRATORY (INHALATION)
OUTPATIENT
Start: 2025-08-21

## 2025-06-25 RX ORDER — DIPHENHYDRAMINE HYDROCHLORIDE 50 MG/ML
50 INJECTION, SOLUTION INTRAMUSCULAR; INTRAVENOUS
Start: 2025-09-18

## 2025-06-25 RX ORDER — HEPARIN SODIUM (PORCINE) LOCK FLUSH IV SOLN 100 UNIT/ML 100 UNIT/ML
5 SOLUTION INTRAVENOUS
OUTPATIENT
Start: 2025-09-18

## 2025-06-25 RX ORDER — MEPERIDINE HYDROCHLORIDE 25 MG/ML
25 INJECTION INTRAMUSCULAR; INTRAVENOUS; SUBCUTANEOUS
OUTPATIENT
Start: 2025-08-21

## 2025-06-25 RX ORDER — DIPHENHYDRAMINE HYDROCHLORIDE 50 MG/ML
25 INJECTION, SOLUTION INTRAMUSCULAR; INTRAVENOUS
Start: 2025-09-18

## 2025-06-26 ENCOUNTER — TELEPHONE (OUTPATIENT)
Dept: ONCOLOGY | Facility: CLINIC | Age: 65
End: 2025-06-26
Payer: COMMERCIAL

## 2025-06-26 ENCOUNTER — LAB (OUTPATIENT)
Dept: LAB | Facility: CLINIC | Age: 65
End: 2025-06-26
Payer: MEDICARE

## 2025-06-26 ENCOUNTER — INFUSION THERAPY VISIT (OUTPATIENT)
Dept: INFUSION THERAPY | Facility: CLINIC | Age: 65
End: 2025-06-26
Attending: INTERNAL MEDICINE
Payer: MEDICARE

## 2025-06-26 VITALS
TEMPERATURE: 97.6 F | HEART RATE: 97 BPM | DIASTOLIC BLOOD PRESSURE: 82 MMHG | OXYGEN SATURATION: 97 % | RESPIRATION RATE: 18 BRPM | SYSTOLIC BLOOD PRESSURE: 120 MMHG

## 2025-06-26 DIAGNOSIS — C34.91 PRIMARY LUNG ADENOCARCINOMA, RIGHT (H): ICD-10-CM

## 2025-06-26 DIAGNOSIS — C34.91 PRIMARY LUNG ADENOCARCINOMA, RIGHT (H): Primary | ICD-10-CM

## 2025-06-26 DIAGNOSIS — R53.83 FATIGUE: ICD-10-CM

## 2025-06-26 LAB
ALBUMIN SERPL BCG-MCNC: 4.1 G/DL (ref 3.5–5.2)
ALP SERPL-CCNC: 91 U/L (ref 40–150)
ALT SERPL W P-5'-P-CCNC: 21 U/L (ref 0–70)
ANION GAP SERPL CALCULATED.3IONS-SCNC: 11 MMOL/L (ref 7–15)
AST SERPL W P-5'-P-CCNC: 22 U/L (ref 0–45)
BASOPHILS # BLD AUTO: 0 10E3/UL (ref 0–0.2)
BASOPHILS NFR BLD AUTO: 0 %
BILIRUB SERPL-MCNC: 0.3 MG/DL
BUN SERPL-MCNC: 8.5 MG/DL (ref 8–23)
CALCIUM SERPL-MCNC: 9.6 MG/DL (ref 8.8–10.4)
CHLORIDE SERPL-SCNC: 101 MMOL/L (ref 98–107)
CREAT SERPL-MCNC: 0.93 MG/DL (ref 0.67–1.17)
EGFRCR SERPLBLD CKD-EPI 2021: >90 ML/MIN/1.73M2
EOSINOPHIL # BLD AUTO: 0.1 10E3/UL (ref 0–0.7)
EOSINOPHIL NFR BLD AUTO: 2 %
ERYTHROCYTE [DISTWIDTH] IN BLOOD BY AUTOMATED COUNT: 13.3 % (ref 10–15)
GLUCOSE SERPL-MCNC: 102 MG/DL (ref 70–99)
HCO3 SERPL-SCNC: 27 MMOL/L (ref 22–29)
HCT VFR BLD AUTO: 48.9 % (ref 40–53)
HGB BLD-MCNC: 15.4 G/DL (ref 13.3–17.7)
IMM GRANULOCYTES # BLD: 0.1 10E3/UL
IMM GRANULOCYTES NFR BLD: 1 %
LYMPHOCYTES # BLD AUTO: 1.7 10E3/UL (ref 0.8–5.3)
LYMPHOCYTES NFR BLD AUTO: 24 %
MCH RBC QN AUTO: 28.8 PG (ref 26.5–33)
MCHC RBC AUTO-ENTMCNC: 31.5 G/DL (ref 31.5–36.5)
MCV RBC AUTO: 92 FL (ref 78–100)
MONOCYTES # BLD AUTO: 0.8 10E3/UL (ref 0–1.3)
MONOCYTES NFR BLD AUTO: 12 %
NEUTROPHILS # BLD AUTO: 4.3 10E3/UL (ref 1.6–8.3)
NEUTROPHILS NFR BLD AUTO: 62 %
NRBC # BLD AUTO: 0 10E3/UL
NRBC BLD AUTO-RTO: 0 /100
PLATELET # BLD AUTO: 290 10E3/UL (ref 150–450)
POTASSIUM SERPL-SCNC: 4.6 MMOL/L (ref 3.4–5.3)
PROT SERPL-MCNC: 7 G/DL (ref 6.4–8.3)
RBC # BLD AUTO: 5.34 10E6/UL (ref 4.4–5.9)
SODIUM SERPL-SCNC: 139 MMOL/L (ref 135–145)
TSH SERPL DL<=0.005 MIU/L-ACNC: 1.85 UIU/ML (ref 0.3–4.2)
WBC # BLD AUTO: 7 10E3/UL (ref 4–11)

## 2025-06-26 PROCEDURE — 36415 COLL VENOUS BLD VENIPUNCTURE: CPT

## 2025-06-26 PROCEDURE — 258N000003 HC RX IP 258 OP 636: Performed by: INTERNAL MEDICINE

## 2025-06-26 PROCEDURE — 85004 AUTOMATED DIFF WBC COUNT: CPT

## 2025-06-26 PROCEDURE — 84443 ASSAY THYROID STIM HORMONE: CPT

## 2025-06-26 PROCEDURE — 82947 ASSAY GLUCOSE BLOOD QUANT: CPT

## 2025-06-26 RX ADMIN — SODIUM CHLORIDE 1500 MG: 9 INJECTION, SOLUTION INTRAVENOUS at 14:13

## 2025-06-26 RX ADMIN — SODIUM CHLORIDE 250 ML: 0.9 INJECTION, SOLUTION INTRAVENOUS at 14:11

## 2025-06-26 NOTE — PROGRESS NOTES
CLINICAL NUTRITION SERVICES- ONCOLOGY DISTRESS SCREENING     Identified Concern and Score From Distress Screenin. How concerned are you about your ability to eat? :  8  2. How concerned are you about unintended weight loss or your current weight? : 8    Date of Distress Screenin/24     Findings: Phone call with Akira. Reports that his appetite is pretty good right now. Weight is stable. No nutrition questions at this time.      Follow-up Required: As needed. Provided RD phone number via [employer].     Guera Woods RD, [place]  [phone] (coverage for Dionne Norris RD)

## 2025-06-26 NOTE — PROGRESS NOTES
Infusion Nursing Note:  Akira Plasencia presents today for C4D1 Imfinzi.    Patient seen by provider today: No   present during visit today: Not Applicable.    Note: Patient needed to reschedule infusion, did not draw cortisol or adrenal corticotropin labs.      Intravenous Access:  Peripheral IV placed.    Treatment Conditions:  Lab Results   Component Value Date    HGB 15.4 06/26/2025    WBC 7.0 06/26/2025    ANEU 4.3 06/26/2025     06/26/2025        Lab Results   Component Value Date     06/26/2025    POTASSIUM 4.6 06/26/2025    CR 0.93 06/26/2025    RAF 9.6 06/26/2025    BILITOTAL 0.3 06/26/2025    ALBUMIN 4.1 06/26/2025    ALT 21 06/26/2025    AST 22 06/26/2025       Results reviewed, labs MET treatment parameters, ok to proceed with treatment.      Post Infusion Assessment:  Patient tolerated infusion without incident.  Blood return noted pre and post infusion.  Site patent and intact, free from redness, edema or discomfort.  No evidence of extravasations.  Access discontinued per protocol.       Discharge Plan:   Patient declined prescription refills.  Discharge instructions reviewed with: Patient.  Patient and/or family verbalized understanding of discharge instructions and all questions answered.  AVS to patient via [employer].  Patient will return 7/24 for next appointment.   Patient discharged in stable condition accompanied by: self.  Departure Mode: Ambulatory.      Dimitrios Esteban RN

## 2025-06-29 ENCOUNTER — HEALTH MAINTENANCE LETTER (OUTPATIENT)
Age: 65
End: 2025-06-29

## 2025-07-01 ENCOUNTER — PATIENT OUTREACH (OUTPATIENT)
Dept: CARE COORDINATION | Facility: CLINIC | Age: 65
End: 2025-07-01
Payer: COMMERCIAL

## 2025-07-01 NOTE — PROGRESS NOTES
"Social Work - Distress Screen Intervention  [facility]    Identified Concern and Score from Distress Screenin. How concerned are you about your ability to eat? (!) 8     2. How concerned are you about unintended weight loss or your current weight? (!) 8     3. How concerned are you about feeling depressed or very sad?  5     4. How concerned are you about feeling anxious or very scared?  7     5. Do you struggle with the loss of meaning and awais in your life?  Somewhat     6. How concerned are you about work and home life issues that may be affected by your cancer?  (!) 8     7. How concerned are you about knowing what resources are available to help you?  (!) 8     8. Do you currently have what you would describe as [employer] or spiritual struggles? Quite a bit     9. If you want to be contacted by one of our professionals, I can send a message to them right now.  No data recorded     Date of Distress Screen: 25  Data: At time of last visit, patient scored positive on distress screening.  outreached to patient today to follow up on elevated distress and introduce psychosocial services and support.  Intervention/Education provided:  contacted patient by phone to discuss distress screening results. SW introduced self and role and patient reports everything is \"good\" right now and doesn't need any supportive resources at this time. SW offered several resources available. Patient provided with SW contact information for outreach when needed.    Follow-up Required:  will remain available to patient for support as needed    SALVADOR Montalvo, CIARA  Clinical , Adult Oncology  [facility]   [address] [place] [id number]  [email or web address]  Office Phone: [phone]  Support Groups at [facility]: Social Work Services for Cancer Patients ([email or web address])        "

## 2025-07-23 ENCOUNTER — ONCOLOGY VISIT (OUTPATIENT)
Dept: ONCOLOGY | Facility: CLINIC | Age: 65
End: 2025-07-23
Attending: NURSE PRACTITIONER
Payer: MEDICARE

## 2025-07-23 ENCOUNTER — LAB (OUTPATIENT)
Dept: LAB | Facility: CLINIC | Age: 65
End: 2025-07-23
Attending: NURSE PRACTITIONER
Payer: COMMERCIAL

## 2025-07-23 VITALS
DIASTOLIC BLOOD PRESSURE: 75 MMHG | TEMPERATURE: 97.1 F | BODY MASS INDEX: 25.61 KG/M2 | WEIGHT: 150 LBS | RESPIRATION RATE: 16 BRPM | SYSTOLIC BLOOD PRESSURE: 115 MMHG | HEART RATE: 60 BPM | OXYGEN SATURATION: 98 % | HEIGHT: 64 IN

## 2025-07-23 DIAGNOSIS — C34.91 PRIMARY LUNG ADENOCARCINOMA, RIGHT (H): Primary | ICD-10-CM

## 2025-07-23 DIAGNOSIS — J44.9 CHRONIC OBSTRUCTIVE PULMONARY DISEASE, UNSPECIFIED COPD TYPE (H): ICD-10-CM

## 2025-07-23 DIAGNOSIS — R53.83 FATIGUE: ICD-10-CM

## 2025-07-23 DIAGNOSIS — R53.83 OTHER FATIGUE: ICD-10-CM

## 2025-07-23 DIAGNOSIS — C34.91 PRIMARY LUNG ADENOCARCINOMA, RIGHT (H): ICD-10-CM

## 2025-07-23 LAB
ALBUMIN SERPL BCG-MCNC: 4 G/DL (ref 3.5–5.2)
ALP SERPL-CCNC: 69 U/L (ref 40–150)
ALT SERPL W P-5'-P-CCNC: 17 U/L (ref 0–70)
ANION GAP SERPL CALCULATED.3IONS-SCNC: 9 MMOL/L (ref 7–15)
AST SERPL W P-5'-P-CCNC: 21 U/L (ref 0–45)
BILIRUB SERPL-MCNC: 0.3 MG/DL
BUN SERPL-MCNC: 16.9 MG/DL (ref 8–23)
CALCIUM SERPL-MCNC: 9.3 MG/DL (ref 8.8–10.4)
CHLORIDE SERPL-SCNC: 104 MMOL/L (ref 98–107)
CREAT SERPL-MCNC: 0.82 MG/DL (ref 0.67–1.17)
EGFRCR SERPLBLD CKD-EPI 2021: >90 ML/MIN/1.73M2
GLUCOSE SERPL-MCNC: 109 MG/DL (ref 70–99)
HCO3 SERPL-SCNC: 25 MMOL/L (ref 22–29)
POTASSIUM SERPL-SCNC: 4.3 MMOL/L (ref 3.4–5.3)
PROT SERPL-MCNC: 6.5 G/DL (ref 6.4–8.3)
SODIUM SERPL-SCNC: 138 MMOL/L (ref 135–145)
TSH SERPL DL<=0.005 MIU/L-ACNC: 0.86 UIU/ML (ref 0.3–4.2)

## 2025-07-23 PROCEDURE — G0463 HOSPITAL OUTPT CLINIC VISIT: HCPCS | Performed by: NURSE PRACTITIONER

## 2025-07-23 ASSESSMENT — PAIN SCALES - GENERAL: PAINLEVEL_OUTOF10: NO PAIN (0)

## 2025-07-23 NOTE — PROGRESS NOTES
July 23, 2025    REASON FOR VISIT: follow up lung cancer    CANCER STAGE:  Cancer Staging   No matching staging information was found for the patient.      HISTORY OF PRESENT ILLNESS:  Akira Plasencia is a 64 yo man who was referred to Dr. Hutchinson for an abnormal CT.  He was treated for a pnuemonia in April 2024.  At that time, he was treated with antibiotics, but he had a repeat CT that showed residual RUL consolidation.  He underwent bronchoscopy on 8/26/24 that showed endobronchial lesion in RUL.  He had a biopsy that was non-diagnostic.   Pet scan on 11/20/24 showed RUL mass with FDG avidity.  There was FDG avid R Upper paratracheal node.   Biopsy was done on 12/13 by CT guidance - this showed lepidic adenocarcinoma, PD-1 <1%, NGS lung panel was negative for any pathologic identified mutation or Fusions.      His case was discussed at tumor board.  He has a large primary lesion.  At the tumor board, it was felt that he was probably resectable and we had discussed doing neoadjuvant chemoimmunotherapy.       He saw Dr. Blanton and while the mass looks like it is potentially resectable, it is very close to large mediastinal blood vessels and it was not clear that it would be resectable.  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  He then met with Dr. Romo to discuss chemoradiation followed by durvalumab.      1/29/25: start of chemo radiation with weekly taxol/carbo     Machelle Cohn, CNP saw on 2/4, tolerated chemo well. Redness/welling over port lessened. Completing doxycyline BID x 7 on 2/4. No fevers. Blood cx 1/29 negative.      Completed radiation on 3/10/25    Started consolidative durvalumab on 4/3/25 and received 3rd cycle on 5/29/25.     6/24/25- Reviewed CT chest, which shows significant disease response    Interval History:  Notes ongoing fatigue and decreased stamina  His  "mother passed so he is no longer her primary caregiver. 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 He naps 1-2x per day. Denies acute shortness of breath but does have dyspnea on exertion with walking a distance. He continues to smoke. Able to do ADLs without limitations. Eating/drinking well. No bowel concerns.     Review Of Systems  10-point review of systems were negative except as noted in HPI.    EXAM:  Blood pressure 115/75, pulse 60, temperature 97.1  F (36.2  C), temperature source Tympanic, resp. rate 16, height 1.626 m (5' 4\"), weight 68 kg (150 lb), SpO2 98%.  GEN: alert and oriented x 3, nad      Current Outpatient Medications   Medication Sig Dispense Refill    magic mouthwash suspension (diphenhydrAMINE, lidocaine, aluminum-magnesium & simethicone) Swish and swallow 10 mLs in mouth every 6 hours as needed for mouth sores. (Patient not taking: Reported on 7/23/2025) 300 mL 2       Most Recent 3 CBC's:  Recent Labs   Lab Test 06/26/25  1300 04/03/25  0715 03/20/25  1523   WBC 7.0 7.8 5.6   HGB 15.4 12.9* 12.3*   MCV 92 92 89    383 349   ANEU 4.3 4.6 3.4     Most Recent 3 BMP's:  Recent Labs   Lab Test 07/23/25  0958 06/26/25  1300 05/29/25  0800    139 138   POTASSIUM 4.3 4.6 4.8   CHLORIDE 104 101 101   CO2 25 27 28   BUN 16.9 8.5 13.1   CR 0.82 0.93 0.84   ANIONGAP 9 11 9   RAF 9.3 9.6 9.1   * 102* 193*   PROTTOTAL 6.5 7.0 6.3*   ALBUMIN 4.0 4.1 3.7    Most Recent 3 LFT's:  Recent Labs   Lab Test 07/23/25  0958 06/26/25  1300 05/29/25  0800   AST 21 22 19   ALT 17 21 18   ALKPHOS 69 91 99   BILITOTAL 0.3 0.3 0.3    Most Recent 2 TSH and T4:  Recent Labs   Lab Test 06/26/25  1300 05/29/25  0800   TSH 1.85 1.27     I reviewed the above labs today.    No results found for this or any previous visit (from the past 744 hours).      Assessment/Plan  Stage 3A NSCLC - completed chemoradiation and is now getting durvalumab consolidation. Continue with durvalumab as he has no evidence of recurrent " disease, with significant response to the disease.  Plan for q3 mo with repeat CT Chest, and continue durvalumab for a year.     Weakness, fatigue: generalized lower stamina since treatment. TSH and Hgb normal, will check cortisol/ACTH tomorrow am. In the meantime, we discussed activity as tolerated and we will follow    30 minutes spent on the date of the encounter doing chart review, review of test results, interpretation of tests, patient visit, and documentation     The longitudinal plan of care for the diagnosis(es)/condition(s) as documented were addressed during this visit. Due to the added complexity in care, I will continue to support Akira in the subsequent management and with ongoing continuity of care.    Machelle Cohn, CNP on 7/23/2025 at 11:09 AM

## 2025-07-23 NOTE — NURSING NOTE
"Oncology Rooming Note    July 23, 2025 10:11 AM   Akira Plasencia is a 65 year old male who presents for:    Chief Complaint   Patient presents with    Oncology Clinic Visit     Primary Lung Adenocarcinoma      Initial Vitals: /75   Pulse 60   Temp 97.1  F (36.2  C) (Tympanic)   Resp 16   Ht 1.626 m (5' 4\")   Wt 68 kg (150 lb)   SpO2 98%   BMI 25.75 kg/m   Estimated body mass index is 25.75 kg/m  as calculated from the following:    Height as of this encounter: 1.626 m (5' 4\").    Weight as of this encounter: 68 kg (150 lb). Body surface area is 1.75 meters squared.  No Pain (0) Comment: Data Unavailable   No LMP for male patient.  Allergies reviewed: Yes  Medications reviewed: Yes    Medications: Medication refills not needed today.  Pharmacy name entered into [employer]: [facility] [address] [place] [address], [place] - [address]    PHQ9:  Did this patient require a PHQ9?: No      Clinical concerns: none      Charles Calderon LPN              "

## 2025-07-23 NOTE — Clinical Note
7/23/2025      Akira Plasencia  [address] [place] [address]      Dear Colleague,    Thank you for referring your patient, Akira Plasencia, to the [facility]. Please see a copy of my visit note below.    Virtual Visit Details    Type of service:  Video Visit   Video Start Time: 1447  Video End Time:1519    Originating Location (pt. Location): Home  {PROVIDER LOCATION On-site should be selected for visits conducted from your clinic location or adjoining [facility] hospital, academic office, or other nearby [facility] building. Off-site should be selected for all other provider locations, including home:[id number]}  Distant Location (provider location):  On-site  Platform used for Video Visit: "[employer]      REASON FOR VISIT:    CANCER STAGE:  Cancer Staging   No matching staging information was found for the patient.        HISTORY OF PRESENT ILLNESS:  Akira Plasencia is a 63 yo man who was referred to Dr. Hutchinson for an abnormal CT.  He was treated for a pnuemonia in April 2024.  At that time, he was treated with antibiotics, but he had a repeat CT that showed residual RUL consolidation.  He underwent bronchoscopy on 8/26/24 that showed endobronchial lesion in RUL.  He had a biopsy that was non-diagnostic.   Pet scan on 11/20/24 showed RUL mass with FDG avidity.  There was FDG avid R Upper paratracheal node.   Biopsy was done on 12/13 by CT guidance - this showed lepidic adenocarcinoma, PD-1 <1%, NGS lung panel was negative for any pathologic identified mutation or Fusions.      His case was discussed at tumor board.  He has a large primary lesion.  At the tumor board, it was felt that he was probably resectable and we had discussed doing neoadjuvant chemoimmunotherapy.       He saw Dr. Blanton and while the mass looks like it is potentially resectable, it is very close to large mediastinal blood vessels and it was not clear that it would be resectable.  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  He then met with Dr. Romo to discuss chemoradiation followed by durvalumab.      1/29/25: start of chemo radiation with weekly taxol/carbo     Machelle Cohn, CNP saw on 2/4, tolerated chemo well. Redness/welling over port lessened. Completing doxycyline BID x 7 on 2/4. No fevers. Blood cx 1/29 negative.      Completed radiation on 3/10/25    Started consolidative durvalumab on 4/3/25 and received 3rd cycle on 5/29/25.     6/24/25- Reviewed CT chest, which shows significant disease response    C/o increasing fatigue and sleepiness. Needs to take few 1-3 hr naps during the daytime, and still needs to sleep during the night d/t persistent fatigue. Also endorses lightheadedness with getting up. Weight and appetite stable. Swallowing problems have resolved, and C/o cough while lying down, with no halitosis. Mildly productive with very scant sputum. SOBOE on walking more than half a mile. Able to do ADLs as usual. Gabapentin has been stopped. No port.         Review Of Systems  10-point review of systems were negative except as noted in HPI.        EXAM:  Blood pressure 115/75, pulse 60, temperature 97.1  F (36.2  C), temperature source Tympanic, resp. rate 16, height 1.626 m (5' 4\"), weight 68 kg (150 lb), SpO2 98%.  GEN: alert and oriented x 3, nad      Current Outpatient Medications   Medication Sig Dispense Refill    magic mouthwash suspension (diphenhydrAMINE, lidocaine, aluminum-magnesium & simethicone) Swish and swallow 10 mLs in mouth every 6 hours as needed for mouth sores. (Patient not taking: Reported on 7/23/2025) 300 mL 2           Recent Labs   Lab Test 04/03/25  0715   WBC 7.8   HGB 12.9*        @labrcent[na,potassium,chloride,co2,bun,cr@  Recent Labs   Lab Test 05/29/25  0800   PROTTOTAL 6.3*   ALBUMIN 3.7   BILITOTAL 0.3   AST 19   ALT 18   ALKPHOS 99         No results " found for this or any previous visit (from the past 744 hours).          Assessment/Plan  Stage 3A NSCLC - completed chemoradiation and is now getting durvalumab consolidation. Continue with durvalumab as he has no evidence of recurrent disease, with significant response to the disease.  Plan for 3 month follow-up with repeat CT Chest, and continue durvalumab for a year.     Weakness, fatigue, presyncope- concern for adrenal insufficiency with durvalumab (no electrolyte abnormality on CMP). No recent CBC, and hence, anaemia cannot be R/o. Is able to walk half-a-mile w/o SOB, and is able to sleep flat without any air hunger, so less likely to be d/t heart failure. Gabapentin has be discontinued. Fatigue is a S/E of  durvalumab, but this would not be a typical presentation. Would need an in-person visit with MENDEZ at [facility], with CBC, AM Cortisol, and ACTH    I saw the patient with Dr. Romo and agree with his note. Pt has no evidence of disease recurrence. His fatigue seems greater than what I would expect from durvalumab or his prior therapy - will repeat labs at his next durva infusion and also look at cortisol and ACTH.  His TSH has been ok.  Will repeat CBC which has not been done in [facility].     July 23, 2025    REASON FOR VISIT: follow up lung cancer    CANCER STAGE:  Cancer Staging   No matching staging information was found for the patient.      HISTORY OF PRESENT ILLNESS:  Akira Plasencia is a 66 yo man who was referred to Dr. Hutchinson for an abnormal CT.  He was treated for a pnuemonia in April 2024.  At that time, he was treated with antibiotics, but he had a repeat CT that showed residual RUL consolidation.  He underwent bronchoscopy on 8/26/24 that showed endobronchial lesion in RUL.  He had a biopsy that was non-diagnostic.   Pet scan on 11/20/24 showed RUL mass with FDG avidity.  There was FDG avid R Upper paratracheal node.   Biopsy was done on 12/13 by CT guidance - this showed lepidic adenocarcinoma,  "PD-1 <1%, NGS lung panel was negative for any pathologic identified mutation or Fusions.      His case was discussed at tumor board.  He has a large primary lesion.  At the tumor board, it was felt that he was probably resectable and we had discussed doing neoadjuvant chemoimmunotherapy.       He saw Dr. Blanton and while the mass looks like it is potentially resectable, it is very close to large mediastinal blood vessels and it was not clear that it would be resectable.  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  He then met with Dr. Romo to discuss chemoradiation followed by durvalumab.      1/29/25: start of chemo radiation with weekly taxol/carbo     Machelle Cohn, CNP saw on 2/4, tolerated chemo well. Redness/welling over port lessened. Completing doxycyline BID x 7 on 2/4. No fevers. Blood cx 1/29 negative.      Completed radiation on 3/10/25    Started consolidative durvalumab on 4/3/25 and received 3rd cycle on 5/29/25.     6/24/25- Reviewed CT chest, which shows significant disease response    Interval History:  Fatigued and decreased stamina  Weight and appetite stable.   Increased short term memory loss  C/o cough while lying down, with no halitosis. Mildly productive with very scant sputum. SOB/EDEN on walking more than half a mile. Able to do ADLs as usual.     Review Of Systems  10-point review of systems were negative except as noted in HPI.    EXAM:  Blood pressure 115/75, pulse 60, temperature 97.1  F (36.2  C), temperature source Tympanic, resp. rate 16, height 1.626 m (5' 4\"), weight 68 kg (150 lb), SpO2 98%.  GEN: alert and oriented x 3, nad      Current Outpatient Medications   Medication Sig Dispense Refill     magic mouthwash suspension (diphenhydrAMINE, lidocaine, aluminum-magnesium & simethicone) Swish and swallow 10 mLs in mouth every 6 hours as needed for " mouth sores. (Patient not taking: Reported on 7/23/2025) 300 mL 2           Most Recent 3 CBC's:  Recent Labs   Lab Test 06/26/25  1300 04/03/25  0715 03/20/25  1523   WBC 7.0 7.8 5.6   HGB 15.4 12.9* 12.3*   MCV 92 92 89    383 349   ANEU 4.3 4.6 3.4     Most Recent 3 BMP's:  Recent Labs   Lab Test 07/23/25  0958 06/26/25  1300 05/29/25  0800    139 138   POTASSIUM 4.3 4.6 4.8   CHLORIDE 104 101 101   CO2 25 27 28   BUN 16.9 8.5 13.1   CR 0.82 0.93 0.84   ANIONGAP 9 11 9   RAF 9.3 9.6 9.1   * 102* 193*   PROTTOTAL 6.5 7.0 6.3*   ALBUMIN 4.0 4.1 3.7    Most Recent 3 LFT's:  Recent Labs   Lab Test 07/23/25  0958 06/26/25  1300 05/29/25  0800   AST 21 22 19   ALT 17 21 18   ALKPHOS 69 91 99   BILITOTAL 0.3 0.3 0.3    Most Recent 2 TSH and T4:  Recent Labs   Lab Test 06/26/25  1300 05/29/25  0800   TSH 1.85 1.27     I reviewed the above labs today.    No results found for this or any previous visit (from the past 744 hours).          Assessment/Plan  Stage 3A NSCLC - completed chemoradiation and is now getting durvalumab consolidation. Continue with durvalumab as he has no evidence of recurrent disease, with significant response to the disease.  Plan for 3 month follow-up with repeat CT Chest, and continue durvalumab for a year.     Weakness, fatigue: concern for adrenal insufficiency with durvalumab (no electrolyte abnormality on CMP). No recent CBC, and hence, anaemia cannot be R/o. Is able to walk half-a-mile w/o SOB, and is able to sleep flat without any air hunger, so less likely to be d/t heart failure. Gabapentin has be discontinued. Fatigue is a S/E of  durvalumab, but this would not be a typical presentation. Would need an in-person visit with MENDEZ at [facility], with CBC, AM Cortisol, and ACTH    I saw the patient with Dr. Romo and agree with his note. Pt has no evidence of disease recurrence. His fatigue seems greater than what I would expect from durvalumab or his prior therapy - will  repeat labs at his next durva infusion and also look at cortisol and ACTH.  His TSH has been ok.  Will repeat CBC which has not been done in awhile.     *** minutes spent on the date of the encounter doing chart review, review of test results, interpretation of tests, patient visit, and documentation           Again, thank you for allowing me to participate in the care of your patient.        Sincerely,        Machelle Cohn, CNP    Electronically signed

## 2025-07-24 ENCOUNTER — INFUSION THERAPY VISIT (OUTPATIENT)
Dept: INFUSION THERAPY | Facility: CLINIC | Age: 65
End: 2025-07-24
Attending: INTERNAL MEDICINE
Payer: MEDICARE

## 2025-07-24 ENCOUNTER — LAB (OUTPATIENT)
Dept: LAB | Facility: CLINIC | Age: 65
End: 2025-07-24
Payer: MEDICARE

## 2025-07-24 VITALS
TEMPERATURE: 97.5 F | WEIGHT: 149.91 LBS | BODY MASS INDEX: 25.73 KG/M2 | SYSTOLIC BLOOD PRESSURE: 138 MMHG | RESPIRATION RATE: 20 BRPM | HEART RATE: 61 BPM | OXYGEN SATURATION: 99 % | DIASTOLIC BLOOD PRESSURE: 76 MMHG

## 2025-07-24 DIAGNOSIS — C34.91 PRIMARY LUNG ADENOCARCINOMA, RIGHT (H): Primary | ICD-10-CM

## 2025-07-24 DIAGNOSIS — R53.83 OTHER FATIGUE: ICD-10-CM

## 2025-07-24 LAB — CORTIS SERPL-MCNC: 14.6 UG/DL

## 2025-07-24 PROCEDURE — 82024 ASSAY OF ACTH: CPT

## 2025-07-24 PROCEDURE — 258N000003 HC RX IP 258 OP 636: Performed by: INTERNAL MEDICINE

## 2025-07-24 PROCEDURE — 250N000011 HC RX IP 250 OP 636: Mod: JZ | Performed by: INTERNAL MEDICINE

## 2025-07-24 PROCEDURE — 82533 TOTAL CORTISOL: CPT

## 2025-07-24 PROCEDURE — 36415 COLL VENOUS BLD VENIPUNCTURE: CPT

## 2025-07-24 RX ADMIN — SODIUM CHLORIDE 250 ML: 0.9 INJECTION, SOLUTION INTRAVENOUS at 09:04

## 2025-07-24 RX ADMIN — SODIUM CHLORIDE 1500 MG: 9 INJECTION, SOLUTION INTRAVENOUS at 09:25

## 2025-07-24 NOTE — PROGRESS NOTES
Infusion Nursing Note:  Akira Plasencia presents today for Cycle 5 Day 1 Imfinzi.    Patient seen by provider today: Yes: Machelle Cohn CNP on 7/23/25.   present during visit today: Not Applicable.    Note: N/A.      Intravenous Access:  Peripheral IV placed.    Treatment Conditions:  Lab Results   Component Value Date     07/23/2025    POTASSIUM 4.3 07/23/2025    CR 0.82 07/23/2025    RAF 9.3 07/23/2025    BILITOTAL 0.3 07/23/2025    ALBUMIN 4.0 07/23/2025    ALT 17 07/23/2025    AST 21 07/23/2025       Results reviewed, labs MET treatment parameters, ok to proceed with treatment.      Post Infusion Assessment:  Patient tolerated infusion without incident.  Blood return noted pre and post infusion.  Site patent and intact, free from redness, edema or discomfort.  No evidence of extravasations.  Access discontinued per protocol.       Discharge Plan:   Patient declined prescription refills.  Discharge instructions reviewed with: Patient.  Patient verbalized understanding of discharge instructions and all questions answered.  AVS to patient via [employer].  Patient will return as scheduled for next appointment.   Patient discharged in stable condition accompanied by: self.  Departure Mode: Ambulatory.      Lindsey Chin RN

## 2025-07-25 LAB — ACTH PLAS-MCNC: 39 PG/ML

## 2025-08-22 ENCOUNTER — LAB (OUTPATIENT)
Dept: LAB | Facility: CLINIC | Age: 65
End: 2025-08-22
Payer: MEDICARE

## 2025-08-22 DIAGNOSIS — C34.91 PRIMARY LUNG ADENOCARCINOMA, RIGHT (H): ICD-10-CM

## 2025-08-22 DIAGNOSIS — R53.83 FATIGUE: ICD-10-CM

## 2025-08-22 PROCEDURE — 82310 ASSAY OF CALCIUM: CPT | Performed by: INTERNAL MEDICINE

## 2025-08-22 PROCEDURE — 36415 COLL VENOUS BLD VENIPUNCTURE: CPT

## 2025-08-22 PROCEDURE — 84443 ASSAY THYROID STIM HORMONE: CPT | Performed by: INTERNAL MEDICINE

## (undated) RX ORDER — FENTANYL CITRATE 50 UG/ML
INJECTION, SOLUTION INTRAMUSCULAR; INTRAVENOUS
Status: DISPENSED
Start: 2024-12-13

## (undated) RX ORDER — CEFAZOLIN SODIUM 2 G/100ML
INJECTION, SOLUTION INTRAVENOUS
Status: DISPENSED
Start: 2025-01-28

## (undated) RX ORDER — FENTANYL CITRATE 50 UG/ML
INJECTION, SOLUTION INTRAMUSCULAR; INTRAVENOUS
Status: DISPENSED
Start: 2024-08-26

## (undated) RX ORDER — LIDOCAINE HYDROCHLORIDE 40 MG/ML
SOLUTION TOPICAL
Status: DISPENSED
Start: 2024-08-26

## (undated) RX ORDER — HEPARIN SODIUM (PORCINE) LOCK FLUSH IV SOLN 100 UNIT/ML 100 UNIT/ML
SOLUTION INTRAVENOUS
Status: DISPENSED
Start: 2025-01-28

## (undated) RX ORDER — HEPARIN SODIUM 1000 [USP'U]/ML
INJECTION, SOLUTION INTRAVENOUS; SUBCUTANEOUS
Status: DISPENSED
Start: 2025-01-28

## (undated) RX ORDER — LIDOCAINE HYDROCHLORIDE 10 MG/ML
INJECTION, SOLUTION EPIDURAL; INFILTRATION; INTRACAUDAL; PERINEURAL
Status: DISPENSED
Start: 2024-12-13

## (undated) RX ORDER — LIDOCAINE HYDROCHLORIDE 10 MG/ML
INJECTION, SOLUTION EPIDURAL; INFILTRATION; INTRACAUDAL; PERINEURAL
Status: DISPENSED
Start: 2024-08-26

## (undated) RX ORDER — LIDOCAINE HYDROCHLORIDE 10 MG/ML
INJECTION, SOLUTION EPIDURAL; INFILTRATION; INTRACAUDAL; PERINEURAL
Status: DISPENSED
Start: 2025-01-28